# Patient Record
Sex: MALE | Race: WHITE | Employment: OTHER | URBAN - METROPOLITAN AREA
[De-identification: names, ages, dates, MRNs, and addresses within clinical notes are randomized per-mention and may not be internally consistent; named-entity substitution may affect disease eponyms.]

---

## 2017-03-09 ENCOUNTER — OFFICE VISIT (OUTPATIENT)
Dept: OPHTHALMOLOGY | Facility: CLINIC | Age: 57
End: 2017-03-09
Attending: OPHTHALMOLOGY
Payer: MEDICARE

## 2017-03-09 DIAGNOSIS — H49.21 PARALYTIC STRABISMUS, SIXTH OR ABDUCENS NERVE PALSY, RIGHT: Primary | ICD-10-CM

## 2017-03-09 PROCEDURE — 99214 OFFICE O/P EST MOD 30 MIN: CPT | Mod: ZF

## 2017-03-09 PROCEDURE — 92060 SENSORIMOTOR EXAMINATION: CPT | Mod: ZF | Performed by: OPHTHALMOLOGY

## 2017-03-09 PROCEDURE — 99214 OFFICE O/P EST MOD 30 MIN: CPT | Mod: 25

## 2017-03-09 PROCEDURE — V2718 FRESNELL PRISM PRESS-ON LENS: HCPCS | Mod: ZF | Performed by: OPHTHALMOLOGY

## 2017-03-09 ASSESSMENT — EXTERNAL EXAM - RIGHT EYE: OD_EXAM: NORMAL

## 2017-03-09 ASSESSMENT — VISUAL ACUITY
CORRECTION_TYPE: GLASSES
OD_CC: J1+
OD_CC: 20/20-1
METHOD: SNELLEN - LINEAR
OS_CC: 20/20
OS_CC: J1+

## 2017-03-09 ASSESSMENT — CONF VISUAL FIELD
OS_NORMAL: 1
OD_NORMAL: 1
METHOD: COUNTING FINGERS

## 2017-03-09 ASSESSMENT — EXTERNAL EXAM - LEFT EYE: OS_EXAM: NORMAL

## 2017-03-09 ASSESSMENT — REFRACTION_WEARINGRX
OS_SPHERE: +1.75
OS_ADD: +2.50
OD_CYLINDER: +0.75
OD_SPHERE: +1.25
OS_AXIS: 038
SPECS_TYPE: BIFOCAL
OS_CYLINDER: +0.25
OD_ADD: +2.50
OD_AXIS: 162

## 2017-03-09 ASSESSMENT — TONOMETRY
OS_IOP_MMHG: 11
OD_IOP_MMHG: 8
IOP_METHOD: TONOPEN

## 2017-03-09 ASSESSMENT — SLIT LAMP EXAM - LIDS
COMMENTS: NORMAL
COMMENTS: NORMAL

## 2017-03-09 NOTE — PROGRESS NOTES
Assessment & Plan     Charles Santos is a 56 year old male with the following diagnoses:   1. Paralytic strabismus, sixth or abducens nerve palsy, right       History of HIV with good CD4 and viral load.  History of meningioma along pontomedullary junction.  History of right 6th nerve palsy in Spring 2015 which resolved spontaneously.  Now with stable right 6th nerve palsy today from December 2016. Had MRI at [facility], will get report and images.  Will give new Fresnel prism today because broke glasses.  Follow up 6 months sooner as needed for worsening symptoms.  If stable at 6 months from today, then consider strabismus surgery or ground-in prism.          Attending Physician Attestation:  I have seen and examined this patient.  I have confirmed and edited as necessary the chief complaint(s), history of present illness, review of systems, relevant history, and examination findings as documented by others.  I have personally reviewed the relevant tests, images, and reports as documented above.  I have confirmed and edited as necessary the assessment and plan and agree with this note.  - Jonathan Villeda MD 8:52 AM 3/9/2017      Placed 4 DAYNE fresnel on Left Lens of [address]

## 2017-03-09 NOTE — MR AVS SNAPSHOT
After Visit Summary   3/9/2017    Charles Santos    MRN: [id number]           Patient Information     Date Of Birth          1960        Visit Information        Provider Department      3/9/2017 8:15 AM Jonathan Villeda MD [facility] Peds Eye General        Today's Diagnoses     Paralytic strabismus, sixth or abducens nerve palsy, right    -  1       Follow-ups after your visit        Follow-up notes from your care team     Return in about 6 months (around 2017) for PWB .      Your next 10 appointments already scheduled     Sep 12, 2017  8:00 AM CDT   RETURN NEURO with Jonathan Villeda MD   Eye Clinic ([facility])    [address]  [address]  [address]  [facility] [address] [phone]              Who to contact     Please call your clinic at [phone] to:    Ask questions about your health    Make or cancel appointments    Discuss your medicines    Learn about your test results    Speak to your doctor   If you have compliments or concerns about an experience at your clinic, or if you wish to file a complaint, please contact [facility] Phys[facility] Patient Relations at [phone] or email us at [email or web address]         Additional Information About Your Visit        MyChart Information     [employer] is an electronic gateway that provides easy, online access to your medical records. With [employer], you can request a clinic appointment, read your test results, renew a prescription or communicate with your care team.     To sign up for [employer] visit the website at [email or web address]   You will be asked to enter the access code listed below, as well as some personal information. Please follow the directions to create your username and password.     Your access code is: [email or web address]  Expires: 2017  8:51 AM     Your access code will  in 90 days. If you need help or a new code, please contact your [facility] or call [phone] for assistance.        Care EveryWhere ID     This is your Care EveryWhere ID. This could be used by other organizations to access your [place] medical records  [id number]         Blood Pressure from Last 3 Encounters:   06/27/16 137/74    Weight from Last 3 Encounters:   05/02/16 73.5 kg (162 lb)              We Performed the Following     FRESNELL PRISM PRESS-ON LENS     Sensorimotor        Primary Care Provider Office Phone # Fax #    Armin Martini [phone]       [facility] [address]   [facility] [address]        Thank you!     Thank you for choosing [facility] EYE GENERAL  for your care. Our goal is always to provide you with excellent care. Hearing back from our patients is one way we can continue to improve our services. Please take a few minutes to complete the written survey that you may receive in the mail after your visit with us. Thank you!             Your Updated Medication List - Protect others around you: Learn how to safely use, store and throw away your medicines at www.disposemymeds.org.          This list is accurate as of: 3/9/17  9:00 AM.  Always use your most recent med list.                   Brand Name Dispense Instructions for use    ACYCLOVIR PO      Take 400 mg by mouth       albuterol 108 (90 BASE) MCG/ACT Inhaler    PROAIR HFA/PROVENTIL HFA/VENTOLIN HFA     Inhale 2 puffs into the lungs every 6 hours       AMITRIPTYLINE HCL PO      Take 10 mg by mouth       amLODIPine 1 mg/mL Susp    NORVASC         dextromethorphan-guaiFENesin  MG per 12 hr tablet    MUCINEX DM     Take 1 tablet by mouth every 12 hours       efavirenz-emtrictabine-tenofovir 600-200-300 MG per tablet    ATRIPLA     Take 1 tablet by mouth At Bedtime       fluticasone 110 MCG/ACT Inhaler    FLOVENT HFA     Inhale 2 puffs into the lungs 2 times daily       fluticasone 50 MCG/ACT spray    FLONASE     Spray 2 sprays into  both nostrils daily       HYDROcodone-acetaminophen 5-325 MG per tablet    NORCO     Take 1 tablet by mouth every 6 hours as needed       LORazepam 0.5 mg/mL NON-STANDARD dilution 0.5 MG/ML Soln solution          metoprolol 10 mg/mL Susp    LOPRESSOR     Take by mouth 2 times daily       NEXIUM PO          nicotine 10 MG Inhaler    NICOTROL     Inhale 1 cartridge into the lungs daily as needed       potassium chloride SA 20 MEQ CR tablet    K-DUR/KLOR-CON M     Take by mouth 2 times daily       sildenafil 2.5 mg/mL Syringe    REVATIO-VIAGRA     Take 20 mg by mouth every 8 hours       VITAMIN D (CHOLECALCIFEROL) PO      Take 1,000 mg by mouth daily       warfarin 5 MG tablet    COUMADIN     Take by mouth daily

## 2017-03-09 NOTE — NURSING NOTE
Chief Complaints and History of Present Illnesses   Patient presents with     Diplopia Follow Up     HPI    Affected eye(s):  Both   Symptoms:     Double vision      Frequency:  Constant       Do you have eye pain now?:  No      Comments:  Pt states concerned because pt continues to have double vision when not wearing his gls. Pt occasionally has some double with gls on. Pt states got a new pair of gls and was given the same fresnel prism that doesn't fit the frame as well. No pain. Allergy drops QAM OU.    Alix Stephens COT 8:09 AM March 9, 2017

## 2017-03-09 NOTE — LETTER
3/9/2017         RE:  :  MRN: Charles Santos  1960  [id number]     Dear Dr. Martini,    Your patient, Charles Santos, returned for neuro-ophthalmic follow up. My assessment and plan are below.  For further details, please see my attached clinic note.          Assessment & Plan     Charles Santos is a 56 year old male with the following diagnoses:   1. Paralytic strabismus, sixth or abducens nerve palsy, right       History of HIV with good CD4 and viral load.  History of meningioma along pontomedullary junction.  History of right 6th nerve palsy in Spring 2015 which resolved spontaneously.  Now with stable right 6th nerve palsy today from 2016. Had MRI at [facility], will get report and images.  Will give new Fresnel prism today because broke glasses.  Follow up 6 months sooner as needed for worsening symptoms.  If stable at 6 months from today, then consider strabismus surgery or ground-in prism.       Again, thank you for allowing me to participate in the care of your patient.      Sincerely,    Jonathan Villeda MD  Professor, Neuro-Ophthalmology  Department of Ophthalmology and Visual Neurosciences  [facility]      CC: Armin Martini MD  [facility]  [address]  [facility] [address]  VIA Facsimile: [phone]     Nithya Kearney MD  [facility]  [address] [address]  VIA Facsimile: [phone]     Johnathon Kim MD   Physicians  [id number]  [facility] [address]  VIA In Basket

## 2017-04-06 ENCOUNTER — TELEPHONE (OUTPATIENT)
Dept: OPHTHALMOLOGY | Facility: CLINIC | Age: 57
End: 2017-04-06

## 2017-04-06 NOTE — TELEPHONE ENCOUNTER
Patient requested that we call regarding his MRI results. He had an MRI done in 12/16 and was told by someone that he had 2 strokes and was worried. We received the report and MRI images. We discussed the results - he has a stable meningioma, chronic ischemic infarcts of right paracentral and right superior frontal lobe. Reassurance provided. He also states that he feels his prisms aren't working as well. Told him we can have one of the orthoptist check alignment again. He will call to schedule that appt as needed.    Vern Chambers MD  Ophthalmology resident, PGY-3

## 2017-04-20 ENCOUNTER — OFFICE VISIT (OUTPATIENT)
Dept: OPHTHALMOLOGY | Facility: CLINIC | Age: 57
End: 2017-04-20
Attending: OPHTHALMOLOGY
Payer: MEDICARE

## 2017-04-20 DIAGNOSIS — H49.21 PARALYTIC STRABISMUS, SIXTH OR ABDUCENS NERVE PALSY, RIGHT: Primary | ICD-10-CM

## 2017-04-20 DIAGNOSIS — H53.2 DIPLOPIA: ICD-10-CM

## 2017-04-20 PROCEDURE — V2718 FRESNELL PRISM PRESS-ON LENS: HCPCS | Mod: ZF

## 2017-04-20 PROCEDURE — 99214 OFFICE O/P EST MOD 30 MIN: CPT | Mod: ZF

## 2017-04-20 ASSESSMENT — VISUAL ACUITY
CORRECTION_TYPE: GLASSES
METHOD: SNELLEN - LINEAR
OD_CC+: -
OD_CC: 20/20
OS_CC: 20/25

## 2017-04-20 ASSESSMENT — REFRACTION_MANIFEST
OS_ADD: +2.50
OD_ADD: +2.50
OD_CYLINDER: +0.75
OS_SPHERE: +1.75
OD_AXIS: 160
OS_CYLINDER: SPHERE
OD_SPHERE: +1.25

## 2017-04-20 ASSESSMENT — REFRACTION_FINALRX
OS_HPRISM: 3 BO
OD_HPRISM: 3 BO

## 2017-04-20 ASSESSMENT — REFRACTION_WEARINGRX
SPECS_TYPE: BIFOCAL
OS_AXIS: 038
OS_ADD: +2.50
OD_SPHERE: +1.25
OS_SPHERE: +1.75
OD_CYLINDER: +0.75
OD_AXIS: 162
OD_ADD: +2.50
OS_CYLINDER: +0.25

## 2017-04-20 NOTE — PROGRESS NOTES
Chief Complaint(s) & History of Present Illness  Chief Complaint   Patient presents with     Diplopia Follow Up     Sixth nerve palsy. Intermittent horizontal diplopia. Diplopic for few hours on Sunday (4/16/17). Wearing fresnel prism LE, helps with double vision, very blurry and seeing the lines of the prism.           Assessment and Plan:      Charles Santos is a 57 year old male who presents with:     Paralytic strabismus, sixth or abducens nerve palsy, right - Right Eye  Stable - continue to monitor   - FRESNELL PRISM PRESS-ON LENS    Diplopia  Intermittent episodes of diplopia with fresnel, worse when driving. Notes blur and lines on fresnel. Interested in trying ground in prism. I gave new Rx with 6 DAYNE ground in. He will fill prn, and added 6 DAYNE fresnel to Left Lens today.   - FRESNELL PRISM PRESS-ON LENS       PLAN:  F/U with Dr. Villeda as scheduled in Sept.

## 2017-04-20 NOTE — MR AVS SNAPSHOT
After Visit Summary   2017    Charles Santos    MRN: [id number]           Patient Information     Date Of Birth          1960        Visit Information        Provider Department      2017 8:30 AM [facility] EYE ORTHOPTICS [facility] Peds Eye General        Today's Diagnoses     Paralytic strabismus, sixth or abducens nerve palsy, right - Right Eye    -  1    Diplopia           Follow-ups after your visit        Follow-up notes from your care team     Return in about 5 months (around 2017), or scheduled apt with Dr. Villeda .      Your next 10 appointments already scheduled     Sep 12, 2017  8:00 AM CDT   RETURN NEURO with Jonathan Villeda MD   Eye Clinic ([facility])    [address]  [address]  [address]  [facility] [id number] [phone]              Who to contact     Please call your clinic at [phone] to:    Ask questions about your health    Make or cancel appointments    Discuss your medicines    Learn about your test results    Speak to your doctor   If you have compliments or concerns about an experience at your clinic, or if you wish to file a complaint, please contact [facility] Phys[facility] Patient Relations at [phone] or email us at [email or web address]         Additional Information About Your Visit        MyChart Information     Networker is an electronic gateway that provides easy, online access to your medical records. With Networker, you can request a clinic appointment, read your test results, renew a prescription or communicate with your care team.     To sign up for [employer] visit the website at [email or web address]   You will be asked to enter the access code listed below, as well as some personal information. Please follow the directions to create your username and password.     Your access code is: [email or web address]  Expires: 2017  9:51 AM     Your access code will  in 90 days. If you need help  or a new code, please contact your [facility] or call [phone] for assistance.        Care EveryWhere ID     This is your Care EveryWhere ID. This could be used by other organizations to access your [place] medical records  [id number]         Blood Pressure from Last 3 Encounters:   06/27/16 137/74    Weight from Last 3 Encounters:   05/02/16 73.5 kg (162 lb)              We Performed the Following     FRESNELL PRISM PRESS-ON LENS        Primary Care Provider Office Phone # Fax #    Armin Martini [phone]       [facility] [address]   [facility] [address]        Thank you!     Thank you for choosing [facility] EYE GENERAL  for your care. Our goal is always to provide you with excellent care. Hearing back from our patients is one way we can continue to improve our services. Please take a few minutes to complete the written survey that you may receive in the mail after your visit with us. Thank you!             Your Updated Medication List - Protect others around you: Learn how to safely use, store and throw away your medicines at www.disposemymeds.org.          This list is accurate as of: 4/20/17  9:43 AM.  Always use your most recent med list.                   Brand Name Dispense Instructions for use    ACYCLOVIR PO      Take 400 mg by mouth       albuterol 108 (90 BASE) MCG/ACT Inhaler    PROAIR HFA/PROVENTIL HFA/VENTOLIN HFA     Inhale 2 puffs into the lungs every 6 hours       AMITRIPTYLINE HCL PO      Take 10 mg by mouth       amLODIPine 1 mg/mL Susp    NORVASC         dextromethorphan-guaiFENesin  MG per 12 hr tablet    MUCINEX DM     Take 1 tablet by mouth every 12 hours       efavirenz-emtrictabine-tenofovir 600-200-300 MG per tablet    ATRIPLA     Take 1 tablet by mouth At Bedtime       fluticasone 110 MCG/ACT Inhaler    FLOVENT HFA     Inhale 2 puffs into the lungs 2 times daily       fluticasone 50 MCG/ACT spray     FLONASE     Spray 2 sprays into both nostrils daily       HYDROcodone-acetaminophen 5-325 MG per tablet    NORCO     Take 1 tablet by mouth every 6 hours as needed       LORazepam 0.5 mg/mL NON-STANDARD dilution 0.5 MG/ML Soln solution          metoprolol 10 mg/mL Susp    LOPRESSOR     Take by mouth 2 times daily       NEXIUM PO          nicotine 10 MG Inhaler    NICOTROL     Inhale 1 cartridge into the lungs daily as needed       potassium chloride SA 20 MEQ CR tablet    K-DUR/KLOR-CON M     Take by mouth 2 times daily       sildenafil 2.5 mg/mL Syringe    REVATIO-VIAGRA     Take 20 mg by mouth every 8 hours       VITAMIN D (CHOLECALCIFEROL) PO      Take 1,000 mg by mouth daily       warfarin 5 MG tablet    COUMADIN     Take by mouth daily

## 2017-04-20 NOTE — NURSING NOTE
Chief Complaint   Patient presents with     Diplopia Follow Up     Sixth nerve palsy. Intermittent horizontal diplopia. Diplopic for few hours on Sunday (4/16/17). Wearing fresnel prism LE, helps with double vision, very blurry and seeing the lines of the prism.

## 2017-09-05 DIAGNOSIS — H53.10 SUBJECTIVE VISUAL DISTURBANCE: Primary | ICD-10-CM

## 2017-09-25 ENCOUNTER — TRANSFERRED RECORDS (OUTPATIENT)
Dept: HEALTH INFORMATION MANAGEMENT | Facility: CLINIC | Age: 57
End: 2017-09-25

## 2017-09-26 ENCOUNTER — TRANSFERRED RECORDS (OUTPATIENT)
Dept: HEALTH INFORMATION MANAGEMENT | Facility: CLINIC | Age: 57
End: 2017-09-26

## 2017-10-04 ENCOUNTER — TRANSFERRED RECORDS (OUTPATIENT)
Dept: HEALTH INFORMATION MANAGEMENT | Facility: CLINIC | Age: 57
End: 2017-10-04

## 2017-10-10 ENCOUNTER — TELEPHONE (OUTPATIENT)
Dept: OPHTHALMOLOGY | Facility: CLINIC | Age: 57
End: 2017-10-10

## 2017-10-12 ENCOUNTER — OFFICE VISIT (OUTPATIENT)
Dept: OPHTHALMOLOGY | Facility: CLINIC | Age: 57
End: 2017-10-12
Attending: OPHTHALMOLOGY
Payer: MEDICARE

## 2017-10-12 DIAGNOSIS — H53.10 SUBJECTIVE VISUAL DISTURBANCE: ICD-10-CM

## 2017-10-12 DIAGNOSIS — H49.21 6TH NERVE PALSY, RIGHT: Primary | ICD-10-CM

## 2017-10-12 PROCEDURE — 92060 SENSORIMOTOR EXAMINATION: CPT | Mod: ZF | Performed by: OPHTHALMOLOGY

## 2017-10-12 PROCEDURE — 99214 OFFICE O/P EST MOD 30 MIN: CPT | Mod: 25,ZF

## 2017-10-12 RX ORDER — ROSUVASTATIN CALCIUM 10 MG/1
10 TABLET, COATED ORAL
COMMUNITY
Start: 2017-10-03 | End: 2018-02-21 | Stop reason: SINTOL

## 2017-10-12 ASSESSMENT — REFRACTION_WEARINGRX
OS_CYLINDER: SPHERE
OD_AXIS: 163
OS_ADD: +2.50
OD_CYLINDER: +0.75
OS_SPHERE: +1.75
OS_HPRISM: 3 BO
OD_ADD: +2.50
OD_HPRISM: 3 BO
OD_SPHERE: +1.50

## 2017-10-12 ASSESSMENT — VISUAL ACUITY
CORRECTION_TYPE: GLASSES
OS_CC: 20/20
OD_CC: 20/20
METHOD: SNELLEN - LINEAR

## 2017-10-12 ASSESSMENT — TONOMETRY
OD_IOP_MMHG: 11
OS_IOP_MMHG: 12
IOP_METHOD: ICARE

## 2017-10-12 ASSESSMENT — CONF VISUAL FIELD
OD_NORMAL: 1
OS_NORMAL: 1

## 2017-10-12 ASSESSMENT — EXTERNAL EXAM - RIGHT EYE: OD_EXAM: NORMAL

## 2017-10-12 ASSESSMENT — EXTERNAL EXAM - LEFT EYE: OS_EXAM: NORMAL

## 2017-10-12 ASSESSMENT — SLIT LAMP EXAM - LIDS
COMMENTS: NORMAL
COMMENTS: NORMAL

## 2017-10-12 NOTE — MR AVS SNAPSHOT
After Visit Summary   10/12/2017    Charles Santos    MRN: [id number]           Patient Information     Date Of Birth          1960        Visit Information        Provider Department      10/12/2017 2:30 PM Jonathan Villeda MD Eye Clinic        Today's Diagnoses     6th nerve palsy, right    -  1    Subjective visual disturbance           Follow-ups after your visit        Follow-up notes from your care team     Return in about 1 year (around 10/12/2018) for Vision, tension color.      Who to contact     Please call your clinic at [phone] to:    Ask questions about your health    Make or cancel appointments    Discuss your medicines    Learn about your test results    Speak to your doctor   If you have compliments or concerns about an experience at your clinic, or if you wish to file a complaint, please contact [facility] Physicians Patient Relations at [phone] or email us at [email or web address]         Additional Information About Your Visit        MyChart Information     [employer] is an electronic gateway that provides easy, online access to your medical records. With KAL, you can request a clinic appointment, read your test results, renew a prescription or communicate with your care team.     To sign up for [employer] visit the website at [email or web address]   You will be asked to enter the access code listed below, as well as some personal information. Please follow the directions to create your username and password.     Your access code is: [email or web address]  Expires: 2017  6:30 AM     Your access code will  in 90 days. If you need help or a new code, please contact your [facility] or call [phone] for assistance.        Care EveryWhere ID     This is your Care EveryWhere ID. This could be used by other organizations to access your [place] medical records  [id number]         Blood Pressure from  Last 3 Encounters:   06/27/16 137/74    Weight from Last 3 Encounters:   05/02/16 73.5 kg (162 lb)              We Performed the Following     IOP Measurement     Sensorimotor        Primary Care Provider Office Phone # Fax #    Armin Martini [phone]       [facility] [address]   [facility] [address]        Equal Access to Services     [facility]: Hadii aad ku hadasho Soomaali, waaxda luqadaha, qaybta kaalmada adeegyada, waxay idiin hayaan adeeg kharash la'aan ah. So [facility] [phone].    ATENCIÓN: Si danial sanchez, tiene a jaime disposición servicios gratuitos de asistencia lingüística. David al [phone].    We comply with applicable federal civil rights laws and [place] laws. We do not discriminate on the basis of race, color, national origin, age, disability, sex, sexual orientation, or gender identity.            Thank you!     Thank you for choosing EYE CLINIC  for your care. Our goal is always to provide you with excellent care. Hearing back from our patients is one way we can continue to improve our services. Please take a few minutes to complete the written survey that you may receive in the mail after your visit with us. Thank you!             Your Updated Medication List - Protect others around you: Learn how to safely use, store and throw away your medicines at www.disposemymeds.org.          This list is accurate as of: 10/12/17  3:45 PM.  Always use your most recent med list.                   Brand Name Dispense Instructions for use Diagnosis    ACYCLOVIR PO      Take 400 mg by mouth        albuterol 108 (90 BASE) MCG/ACT Inhaler    PROAIR HFA/PROVENTIL HFA/VENTOLIN HFA     Inhale 2 puffs into the lungs every 6 hours        AMITRIPTYLINE HCL PO      Take 10 mg by mouth        amLODIPine 1 mg/mL Susp    NORVASC          dextromethorphan-guaiFENesin  MG per 12 hr tablet    MUCINEX DM     Take 1 tablet by mouth every 12 hours         efavirenz-emtrictabine-tenofovir 600-200-300 MG per tablet    ATRIPLA     Take 1 tablet by mouth At Bedtime        fluticasone 110 MCG/ACT Inhaler    FLOVENT HFA     Inhale 2 puffs into the lungs 2 times daily        fluticasone 50 MCG/ACT spray    FLONASE     Spray 2 sprays into both nostrils daily        HYDROcodone-acetaminophen 5-325 MG per tablet    NORCO     Take 1 tablet by mouth every 6 hours as needed        LORazepam 0.5 mg/mL NON-STANDARD dilution 0.5 MG/ML Soln solution           metoprolol 10 mg/mL Susp    LOPRESSOR     Take by mouth 2 times daily        NEXIUM PO           nicotine 10 MG Inhaler    NICOTROL     Inhale 1 cartridge into the lungs daily as needed        potassium chloride SA 20 MEQ CR tablet    K-DUR/KLOR-CON M     Take by mouth 2 times daily        rosuvastatin 10 MG tablet    CRESTOR     Take 10 mg by mouth        sildenafil 2.5 mg/mL Syringe    REVATIO     Take 20 mg by mouth every 8 hours        VITAMIN D (CHOLECALCIFEROL) PO      Take 1,000 mg by mouth daily        warfarin 5 MG tablet    COUMADIN     Take by mouth daily

## 2017-10-12 NOTE — PROGRESS NOTES
Assessment & Plan     Charles Santos is a 57 year old male with the following diagnoses:   1. 6th nerve palsy, right    2. Subjective visual disturbance         Orthoptist Babar has given him ground prisms and he is happy with them. He is unsure about surgery. His misalignment is small but can be certainly helped with single medial rectus recession preferably the left eye. Follow up 1 year sooner as needed for worsening symptoms.             Attending Physician Attestation:  Complete documentation of historical and exam elements from today's encounter can be found in the full encounter summary report (not reduplicated in this progress note).  I personally obtained the chief complaint(s) and history of present illness.  I confirmed and edited as necessary the review of systems, past medical/surgical history, family history, social history, and examination findings as documented by others; and I examined the patient myself.  I personally reviewed the relevant tests, images, and reports as documented above.  I formulated and edited as necessary the assessment and plan and discussed the findings and management plan with the patient and family. - Jonathan Villeda MD

## 2017-10-12 NOTE — NURSING NOTE
Chief Complaints and History of Present Illnesses   Patient presents with     Neurologic Problem     STRAB F/U      HPI    Symptoms:              Comments:  Charles Santos is a 57 year old male who presents with:      Right 6th nerve palsy   No significant change since the last visit.   6 base out was incorporated at last visit.   No diplopia with glasses on.   With glasses off: no diplopia, but can watch TV without them well.     Geovanna ALMAGUER 2:49 PM October 12, 2017

## 2017-10-27 ENCOUNTER — TRANSFERRED RECORDS (OUTPATIENT)
Dept: HEALTH INFORMATION MANAGEMENT | Facility: CLINIC | Age: 57
End: 2017-10-27

## 2018-01-08 ENCOUNTER — TRANSFERRED RECORDS (OUTPATIENT)
Dept: HEALTH INFORMATION MANAGEMENT | Facility: CLINIC | Age: 58
End: 2018-01-08

## 2018-02-07 ENCOUNTER — TRANSFERRED RECORDS (OUTPATIENT)
Dept: HEALTH INFORMATION MANAGEMENT | Facility: CLINIC | Age: 58
End: 2018-02-07

## 2018-02-16 ENCOUNTER — TRANSFERRED RECORDS (OUTPATIENT)
Dept: HEALTH INFORMATION MANAGEMENT | Facility: CLINIC | Age: 58
End: 2018-02-16

## 2018-02-21 ENCOUNTER — OFFICE VISIT (OUTPATIENT)
Dept: NEUROSURGERY | Facility: CLINIC | Age: 58
End: 2018-02-21
Payer: MEDICARE

## 2018-02-21 VITALS
HEART RATE: 63 BPM | SYSTOLIC BLOOD PRESSURE: 118 MMHG | HEIGHT: 69 IN | DIASTOLIC BLOOD PRESSURE: 59 MMHG | WEIGHT: 147.6 LBS | BODY MASS INDEX: 21.86 KG/M2

## 2018-02-21 DIAGNOSIS — D32.0: Primary | ICD-10-CM

## 2018-02-21 ASSESSMENT — ENCOUNTER SYMPTOMS
MUSCLE WEAKNESS: 0
BACK PAIN: 0
JOINT SWELLING: 0
POLYPHAGIA: 0
CHILLS: 0
HALLUCINATIONS: 0
STIFFNESS: 0
FEVER: 0
NECK PAIN: 1
POLYDIPSIA: 0
DECREASED APPETITE: 0
MYALGIAS: 1
WEIGHT LOSS: 0
FATIGUE: 0
ARTHRALGIAS: 0
NIGHT SWEATS: 0
INCREASED ENERGY: 0
MUSCLE CRAMPS: 1
ALTERED TEMPERATURE REGULATION: 0
WEIGHT GAIN: 0

## 2018-02-21 ASSESSMENT — PAIN SCALES - GENERAL: PAINLEVEL: SEVERE PAIN (6)

## 2018-02-21 NOTE — PROGRESS NOTES
Service Date: 02/21/2018      HISTORY OF PRESENT ILLNESS:  Mr. Santos is seen today at his request to discuss the treatment of his right cerebellopontine angle meningioma.      Mr. Santos is a 57-year-old gentleman with well-controlled HIV and history of pulmonary emboli for which he is on Coumadin who was discovered several years ago to have a meningioma in the right cerebellopontine angle.  This was discovered because of double vision for which he has been followed by Dr. Jonathan Villeda here at the [facility].      His HIV was being treated and general medical care was being given in the [facility] system, but he recently transferred to [facility].  In October, on an MRI to follow up on the meningioma, it was felt that there have been some growth of the tumor and consideration of treatment was recommended.  He spoke both with the radiation oncologists and with Dr. Javier Escalante, one of the neurosurgeons at [facility], and treatment was recommended.  He chose to proceed with surgical treatment.      He was scheduled for surgery this week, but his surgeon is unable to carry out the operation and so he was seeking an alternative surgeon.      His diplopia is mild and well controlled by prism.  He has no other symptoms referable to any of the cranial nerves that would be affected by this tumor.  He does have a chronic head and neck headache, but there is no reason to attribute either directly to this tumor.      He has normal facial movement.  I cannot detect the eye movement disturbance that causes his diplopia.  He has no facial sensory loss.  He says his hearing is normal, and he is not complaining of any balance disturbances.      IMAGING:  I reviewed the scans in detail with him.  The amount of growth registered on the scans that we have, which extended from 07/2016 to 10/2017, is no greater than 2 mm in one direction.  I told him that while it is quite likely this tumor is growing slowly that the  amount of growth demonstrated did not signal any immediate danger to him.  The tumor does touch the surface of the brainstem and it is a rational to consider treatment at this time.      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 Treatment is appropriate    PLAN:  We reviewed both radiosurgical treatment and surgical treatment.  I talked to him about the potential complications related to the nerves adjacent to the tumor including hearing loss, balance disturbance, facial paralysis and numbness of the face.  I told him I could not confidently predict that there would be any change in the double vision but that it was possible that it could either improve or get worse with either surgical or radiosurgical treatment.  We talked about the potential for very serious complications such as stroke, coma and death related to the open surgery and a very low risk of malignant transformation of the tumor with radiosurgery.  We talked about the differences in recovery.  Since radiosurgery is a single treatment outpatient procedure, the recovery is quite rapid, and it is unusual for people to need to take more than a day off.  There are risks of complications coming on from a reaction to the radiation which usually come on several months after treatment that are usually temporary.      We talked about a 2-3 day hospital stay and 4-6 weeks of recovery time after surgery.  Again, I emphasized that I could not confidently predict whether the double vision would be improved or worsened.      We talked about failure of treatment which in radiosurgery can is manifested by continued growth of the tumor and an open surgery would be related to recurrence of the tumor after resection.  In both circumstances, a rate of about 20% over time is expected.      We talked about the fact that his history of pulmonary embolism and requirement for Coumadin and his history of smoking, which he has been unable to stop, both  increase his surgical risk by several percent.      He had already been exposed to all of these issues and has thought them through very carefully.  He asked several questions and then requested that we proceed with scheduling his surgery to be done here.      We will be making those arrangements on an elective basis.         ALLY TURNER MD             D: 2018   T: 2018   MT: KIMI      Name:     JESSIE OSWALD   MRN:      [id number]        Account:      [id number]   :      1960           Service Date: 2018      Document: [id number]

## 2018-02-21 NOTE — PATIENT INSTRUCTIONS
Pre-op Teaching    Procedure:Suboccipital resection of right CP angle meningioma  Planned Surgery Date:By Hospital Scheduling  Surgeon:Kamar                Discussed pre-op routine and requirements to include:  surgical procedure, post-op recovery and expectations, need for H&P, NPO prior to OR, pre-op antibacterial showers, pain control and importance of follow-up visits.  Surgery scheduling will coordinate OR time/date and update patient as appropriate.  3C will call to re-inforce instructions 24-48 hours prior to surgery.       Ample time was provided for patient questions and in-depth discussion of topics of heightened interest.  Reviewed medication list and provided instructions regarding what medications to stop prior to surgery:PAC to determine.   Anti-bacterial soap solution for pre-op showers will be provided at PAC visit as well as specific instructions for use. Approximately 20 minutes spent with patient/family discussing and reviewing.      Patient provided triage contact number for questions or concerns that may arise prior to surgery. Pre-op folder with specific written instructions given to patient for review.

## 2018-02-21 NOTE — MR AVS SNAPSHOT
After Visit Summary   2/21/2018    Charles Santos    MRN: [id number]           Patient Information     Date Of Birth          1960        Visit Information        Provider Department      2/21/2018 11:00 AM Azeem Galvin MD [facility] Neurosurgery        Today's Diagnoses     CPA meningioma (H)    -  1      Care Instructions    Pre-op Teaching    Procedure:Suboccipital resection of right CP angle meningioma  Planned Surgery Date:By Hospital Scheduling  Surgeon:Kamar                Discussed pre-op routine and requirements to include:  surgical procedure, post-op recovery and expectations, need for H&P, NPO prior to OR, pre-op antibacterial showers, pain control and importance of follow-up visits.  Surgery scheduling will coordinate OR time/date and update patient as appropriate.  3C will call to re-inforce instructions 24-48 hours prior to surgery.       Ample time was provided for patient questions and in-depth discussion of topics of heightened interest.  Reviewed medication list and provided instructions regarding what medications to stop prior to surgery:PAC to determine.   Anti-bacterial soap solution for pre-op showers will be provided at PAC visit as well as specific instructions for use. Approximately 20 minutes spent with patient/family discussing and reviewing.      Patient provided triage contact number for questions or concerns that may arise prior to surgery. Pre-op folder with specific written instructions given to patient for review.            Follow-ups after your visit        Additional Services     PAC Visit Referral (For [facility] Only)       Does this visit require an Anesthesia consult?  Pre-op H&P for craniotomy for tumor resection.    H&P done by:  PAC    Please be aware that coverage of these services is subject to the terms and limitations of your health insurance plan.  Call member services at your health plan with any benefit or coverage questions.      Please bring  the following to your appointment:  >>   Any x-rays, CTs or MRIs which have been performed.  Contact the facility where they were done to arrange for  prior to your scheduled appointment.  Any new CT, MRI or other procedures ordered by your specialist must be performed at a [facility] or coordinated by your clinic's referral office.    >>   List of current medications  >>   This referral request   >>   Any documents/labs given to you for this referral                  Your next 10 appointments already scheduled     Apr 02, 2018  9:00 AM CDT   (Arrive by 8:45 AM)   Hearing Evaluation with Amanda Chong   [facility] ([facility])    [address] [place] [id number] [phone]           Please see your medical professional for ear cleaning prior to this appointment if you believe wax buildup may be an issue. All patients are required to have a physician's order stating the medical reason for the hearing test. Your doctor can send an electronic order, use their own form or we have provided a form (called Physician's Order for Audiology Services). It states that there is a medical reason for your exam. Without an order you may need to be rescheduled until the order can be obtained.            Apr 02, 2018 10:00 AM CDT   (Arrive by 9:45 AM)   PAC EVALUATION with Angelita Pac Jordan 7   [facility] Preoperative Assessment [place] ([facility])    [address] [place] [address]   [phone]            Apr 02, 2018 11:00 AM CDT   (Arrive by 10:45 AM)   PAC RN ASSESSMENT with Angelita Pac Rn   [facility] Assessment [place] ([facility])    [address] [place] [address]   [phone]            Apr 02, 2018 11:20 AM CDT   (Arrive by 11:05 AM)   PAC Anesthesia Consult with Angelita Pac Anesthesiologist   [facility] Assessment [place] ([facility]  Clinics [facility])    [address]  [address]  [facility] [address]   [phone]            Apr 12, 2018  9:20 AM CDT   (Arrive by 9:05 AM)   CT HEAD W CONTRAST with UUCT1   [facility], [place], [place] ([facility])    [address] [id number] [phone]           Please bring any scans or X-rays taken at other hospitals, if similar tests were done. Also bring a list of your medicines, including vitamins, minerals and over-the-counter drugs. It is safest to leave personal items at home.  Be sure to tell your doctor:   If you have any allergies.   If there s any chance you are pregnant.   If you are breastfeeding.    If you have diabetes as your medication may need to be adjusted for this exam.  You will have contrast for this exam. To prepare:   Do not eat or drink for 2 hours before your exam. If you need to take medicine, you may take it with small sips of water. (We may ask you to take liquid medicine as well.)   The day before your exam, drink extra fluids at least six 8-ounce glasses (unless your doctor tells you to restrict your fluids).  Patients over 70 or patients with diabetes or kidney problems:   If you haven t had a blood test (creatinine test) within the last 30 days, the Cardiologist/Radiologist may require you to get this test prior to your exam.  Please wear loose clothing, such as a sweat suit or jogging clothes. Avoid snaps, zippers and other metal. We may ask you to undress and put on a hospital gown.  If you have any questions, please call the Imaging Department where you will have your exam.            Apr 12, 2018   Procedure with Azeem Galvin MD   [facility], [place], Same Day Surgery (--)    [address] [id number] [phone]              Who to contact     Please call your clinic at [phone] to:    Ask questions about your health    Make or cancel appointments    Discuss your  "medicines    Learn about your test results    Speak to your doctor            Additional Information About Your Visit        MyChart Information     [employer] is an electronic gateway that provides easy, online access to your medical records. With [employer], you can request a clinic appointment, read your test results, renew a prescription or communicate with your care team.     To sign up for [employer] visit the website at [email or web address]   You will be asked to enter the access code listed below, as well as some personal information. Please follow the directions to create your username and password.     Your access code is: [email or web address]  Expires: 2018  6:30 AM     Your access code will  in 90 days. If you need help or a new code, please contact your [facility] or call [phone] for assistance.        Care EveryWhere ID     This is your Care EveryWhere ID. This could be used by other organizations to access your [facility] medical records  [id number]        Your Vitals Were     Pulse Height BMI (Body Mass Index)             63 1.753 m (5' 9\") 21.8 kg/m2          Blood Pressure from Last 3 Encounters:   18 118/59   16 137/74    Weight from Last 3 Encounters:   18 67 kg (147 lb 9.6 oz)   16 73.5 kg (162 lb)              We Performed the Following     [place] AUDIOGRAM     PAC Visit Referral (For [facility] Only)     [facility]igha-Operative Worksheet          Today's Medication Changes          These changes are accurate as of 18 11:59 PM.  If you have any questions, ask your nurse or doctor.               Stop taking these medicines if you haven't already. Please contact your care team if you have questions.     rosuvastatin 10 MG tablet   Commonly known as:  CRESTOR   Stopped by:  Azeem Galvin MD                    Primary Care Provider Office Phone # Fax #    Chapo Zafar [phone]       [facility] [address] [place] [address]        Equal Access to Services     BHARGAVIDAVID HUI : Hadii ji ku hadjames Conklin, wazahrada luqcortney, qamansoorta kajonathonda evatiarra, waxsherri stefaniein hayaaeleuterio brown zeynepkari luna . So [facility] [phone].    ATENCIÓN: Si habla español, tiene a jaime disposición servicios gratuitos de asistencia lingüística. [facility] [phone].    We comply with applicable federal civil rights laws and [place] laws. We do not discriminate on the basis of race, color, national origin, age, disability, sex, sexual orientation, or gender identity.            Thank you!     Thank you for choosing [facility]  for your care. Our goal is always to provide you with excellent care. Hearing back from our patients is one way we can continue to improve our services. Please take a few minutes to complete the written survey that you may receive in the mail after your visit with us. Thank you!             Your Updated Medication List - Protect others around you: Learn how to safely use, store and throw away your medicines at www.disposemymeds.org.          This list is accurate as of 2/21/18 11:59 PM.  Always use your most recent med list.                   Brand Name Dispense Instructions for use Diagnosis    ACYCLOVIR PO      Take 400 mg by mouth        albuterol 108 (90 BASE) MCG/ACT Inhaler    PROAIR HFA/PROVENTIL HFA/VENTOLIN HFA     Inhale 2 puffs into the lungs every 6 hours as needed        AMITRIPTYLINE HCL PO      Take 10 mg by mouth nightly as needed        amLODIPine 1 mg/mL Susp    NORVASC     Take 5 mg by mouth daily        efavirenz-emtrictabine-tenofovir 600-200-300 MG per tablet    ATRIPLA     Take 1 tablet by mouth At Bedtime        fluticasone 110 MCG/ACT Inhaler    FLOVENT HFA     Inhale 2 puffs into the lungs 2 times daily as needed        fluticasone 50 MCG/ACT spray    FLONASE     Spray 2 sprays into both nostrils daily        LORazepam 0.5 mg/mL NON-STANDARD dilution 0.5 MG/ML Soln  solution      Take by mouth every 6 hours as needed        metoprolol 10 mg/mL Susp    LOPRESSOR     Take by mouth daily        NEXIUM PO      Take by mouth every morning (before breakfast)        nicotine 10 MG Inhaler    NICOTROL     Inhale 1 cartridge into the lungs daily as needed        potassium chloride SA 20 MEQ CR tablet    K-DUR/KLOR-CON M     Take by mouth daily Takes 2 pills once per day        sildenafil 2.5 mg/mL Syringe    REVATIO     Take 20 mg by mouth every 8 hours        VITAMIN D (CHOLECALCIFEROL) PO      Take 1,000 mg by mouth daily        warfarin 5 MG tablet    COUMADIN     Take by mouth daily

## 2018-02-21 NOTE — LETTER
2/21/2018       RE: Charles Santos  [address]  [place] [address]     Dear Colleague,    Thank you for referring your patient, Charles Santos, to the [facility] NEUROSURGERY at [facility]. Please see a copy of my visit note below.    Service Date: 02/21/2018      HISTORY OF PRESENT ILLNESS:  Mr. Santos is seen today at his request to discuss the treatment of his right cerebellopontine angle meningioma.      Mr. Santos is a 57-year-old gentleman with well-controlled HIV and history of pulmonary emboli for which he is on Coumadin who was discovered several years ago to have a meningioma in the right cerebellopontine angle.  This was discovered because of double vision for which he has been followed by Dr. Jonathan Villeda here at the [facility].      His HIV was being treated and general medical care was being given in the [facility] system, but he recently [facility] to [facility].  In October, on an MRI to follow up on the meningioma, it was felt that there have been some growth of the tumor and consideration of treatment was recommended.  He spoke both with the radiation oncologists and with Dr. Javier Escalante, one of the neurosurgeons at [facility], and treatment was recommended.  He chose to proceed with surgical treatment.      He was scheduled for surgery this week, but his surgeon is unable to carry out the operation and so he was seeking an alternative surgeon.      His diplopia is mild and well controlled by prism.  He has no other symptoms referable to any of the cranial nerves that would be affected by this tumor.  He does have a chronic head and neck headache, but there is no reason to attribute either directly to this tumor.      He has normal facial movement.  I cannot detect the eye movement disturbance that causes his diplopia.  He has no facial sensory loss.  He says his hearing is normal, and he is not complaining of any balance  disturbances.      IMAGING:  I reviewed the scans in detail with him.  The amount of growth registered on the scans that we have, which extended from 07/2016 to 10/2017, is no greater than 2 mm in one direction.  I told him that while it is quite likely this tumor is growing slowly that the amount of growth demonstrated did not signal any immediate danger to him.  The tumor does touch the surface of the brainstem and it is a rational to consider treatment at this time.      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 Treatment is appropriate    PLAN:  We reviewed both radiosurgical treatment and surgical treatment.  I talked to him about the potential complications related to the nerves adjacent to the tumor including hearing loss, balance disturbance, facial paralysis and numbness of the face.  I told him I could not confidently predict that there would be any change in the double vision but that it was possible that it could either improve or get worse with either surgical or radiosurgical treatment.  We talked about the potential for very serious complications such as stroke, coma and death related to the open surgery and a very low risk of malignant transformation of the tumor with radiosurgery.  We talked about the differences in recovery.  Since radiosurgery is a single treatment outpatient procedure, the recovery is quite rapid, and it is unusual for people to need to take more than a day off.  There are risks of complications coming on from a reaction to the radiation which usually come on several months after treatment that are usually temporary.      We talked about a 2-3 day hospital stay and 4-6 weeks of recovery time after surgery.  Again, I emphasized that I could not confidently predict whether the double vision would be improved or worsened.      We talked about failure of treatment which in radiosurgery can is manifested by continued growth of the tumor and an open surgery would  be related to recurrence of the tumor after resection.  In both circumstances, a rate of about 20% over time is expected.      We talked about the fact that his history of pulmonary embolism and requirement for Coumadin and his history of smoking, which he has been unable to stop, both increase his surgical risk by several percent.      He had already been exposed to all of these issues and has thought them through very carefully.  He asked several questions and then requested that we proceed with scheduling his surgery to be done here.      We will be making those arrangements on an elective basis.     D: 2018   T: 2018   MT: KIMI      Name:     JESSIE OSWALD   MRN:      [id number]        Account:      [id number]   :      1960           Service Date: 2018      Document: [id number]        Again, thank you for allowing me to participate in the care of your patient.      Sincerely,    Azeem Galvin MD

## 2018-02-22 ENCOUNTER — TRANSFERRED RECORDS (OUTPATIENT)
Dept: HEALTH INFORMATION MANAGEMENT | Facility: CLINIC | Age: 58
End: 2018-02-22

## 2018-02-22 DIAGNOSIS — D32.0 BENIGN NEOPLASM OF CEREBRAL MENINGES (H): Primary | ICD-10-CM

## 2018-02-23 ENCOUNTER — CARE COORDINATION (OUTPATIENT)
Dept: NEUROSURGERY | Facility: CLINIC | Age: 58
End: 2018-02-23

## 2018-02-23 RX ORDER — CLINDAMYCIN PHOSPHATE 900 MG/50ML
900 INJECTION, SOLUTION INTRAVENOUS SEE ADMIN INSTRUCTIONS
Status: CANCELLED | OUTPATIENT
Start: 2018-02-23

## 2018-02-23 RX ORDER — CLINDAMYCIN PHOSPHATE 900 MG/50ML
900 INJECTION, SOLUTION INTRAVENOUS
Status: CANCELLED | OUTPATIENT
Start: 2018-02-23

## 2018-02-23 NOTE — PROGRESS NOTES
Pre-op Teaching    Procedure:Suboccipital Resection of Right CP Angle Meningioma.  Planned Surgery Date:4/12/18              Discussed pre-op routine and requirements to include:  surgical procedure, post-op recovery and expectations, need for H&P, NPO prior to OR, pre-op antibacterial showers, pain control and importance of follow-up visits.  Surgery scheduling will coordinate OR time/date and update patient as appropriate.  3C will call to re-inforce instructions 24-48 hours prior to surgery.       Ample time was provided for patient questions and in-depth discussion of topics of heightened interest.  Reviewed medication list and provided instructions regarding what medications to stop prior to surgery: Coumadin, will contact Dr. Zafar for Instructions/Bridging.   Anti-bacterial soap solution for pre-op showers will be provided at PAC visit as well as specific instructions for use. Approximately 20 minutes spent with patient/family discussing and reviewing.      Patient provided triage contact number for questions or concerns that may arise prior to surgery. Pre-op folder with specific written instructions given to patient for review.  Surgery Date/Time, Pac appointments, Hearing test appointment reviewed with patient.  Surgery letter and Neuro pre op check list forms mailed to patient.  Patient verifies information.       2/23/18 9:45am Dr. Zafar [phone] Manages and prescribes. Call to office, spoke with Jordyn, note written for Dr Zafar for Coumadin/Bridging instructions prior to Menigioma surgery on 4/12/18.  States office will get back to patient and myself, phone number given.    Voices understanding.   Surgery sheet and pre op check list mailed to patient.

## 2018-03-07 ENCOUNTER — MEDICAL CORRESPONDENCE (OUTPATIENT)
Dept: HEALTH INFORMATION MANAGEMENT | Facility: CLINIC | Age: 58
End: 2018-03-07

## 2018-03-08 NOTE — PROGRESS NOTES
Spoke with Soniya Elizondo office that monitors patients anticoagulation program.  Patient now on Xarelto 20mg daily.  Per Dr. Gamino patient may stop Xarelto up to 72 hours prior to procedure set for April 4/12/18 with Dr. Galvin.  4/12/18:  Suboccipital Resection of Right CP Angle Meningioma.    Soniya rojas hard copy.  PAC appointment 4/2/18.  Last dose of Xarelto on 4/8/18.

## 2018-03-23 NOTE — PROGRESS NOTES
Mr. Santos is a 57 year old man with h/o meningioma who is planned for removal 4/12 who called the on call resident Today at 5 pm regarding a spell that occurred on 3/20. He says that he was told to contact the neurosurgery resident.  He spoke with the neurosurgery resident.

## 2018-03-24 ENCOUNTER — HOSPITAL ENCOUNTER (EMERGENCY)
Facility: CLINIC | Age: 58
Discharge: HOME | End: 2018-03-24
Attending: EMERGENCY MEDICINE | Admitting: EMERGENCY MEDICINE
Payer: MEDICARE

## 2018-03-24 VITALS
SYSTOLIC BLOOD PRESSURE: 143 MMHG | WEIGHT: 154.1 LBS | RESPIRATION RATE: 18 BRPM | DIASTOLIC BLOOD PRESSURE: 81 MMHG | OXYGEN SATURATION: 97 % | BODY MASS INDEX: 22.76 KG/M2 | TEMPERATURE: 97.8 F

## 2018-03-24 DIAGNOSIS — R46.89 SPELL OF ABNORMAL BEHAVIOR: ICD-10-CM

## 2018-03-24 DIAGNOSIS — R25.1 EPISODE OF SHAKING: ICD-10-CM

## 2018-03-24 DIAGNOSIS — D32.9 MENINGIOMA (H): Primary | ICD-10-CM

## 2018-03-24 DIAGNOSIS — D32.9 MENINGIOMA (H): ICD-10-CM

## 2018-03-24 LAB
ALBUMIN SERPL-MCNC: 3.6 G/DL (ref 3.4–5)
ALBUMIN UR-MCNC: NEGATIVE MG/DL
ALP SERPL-CCNC: 78 U/L (ref 40–150)
ALT SERPL W P-5'-P-CCNC: 22 U/L (ref 0–70)
ANION GAP SERPL CALCULATED.3IONS-SCNC: 7 MMOL/L (ref 3–14)
APAP SERPL-MCNC: <2 MG/L (ref 10–20)
APPEARANCE UR: CLEAR
AST SERPL W P-5'-P-CCNC: 6 U/L (ref 0–45)
BASOPHILS # BLD AUTO: 0 10E9/L (ref 0–0.2)
BASOPHILS NFR BLD AUTO: 0.3 %
BILIRUB SERPL-MCNC: 0.3 MG/DL (ref 0.2–1.3)
BILIRUB UR QL STRIP: NEGATIVE
BUN SERPL-MCNC: 12 MG/DL (ref 7–30)
CALCIUM SERPL-MCNC: 8.8 MG/DL (ref 8.5–10.1)
CHLORIDE SERPL-SCNC: 110 MMOL/L (ref 94–109)
CO2 SERPL-SCNC: 21 MMOL/L (ref 20–32)
COLOR UR AUTO: NORMAL
CREAT SERPL-MCNC: 0.7 MG/DL (ref 0.66–1.25)
DIFFERENTIAL METHOD BLD: ABNORMAL
EOSINOPHIL # BLD AUTO: 0.1 10E9/L (ref 0–0.7)
EOSINOPHIL NFR BLD AUTO: 1 %
ERYTHROCYTE [DISTWIDTH] IN BLOOD BY AUTOMATED COUNT: 14.3 % (ref 10–15)
GFR SERPL CREATININE-BSD FRML MDRD: >90 ML/MIN/1.7M2
GLUCOSE SERPL-MCNC: 120 MG/DL (ref 70–99)
GLUCOSE UR STRIP-MCNC: NEGATIVE MG/DL
HCT VFR BLD AUTO: 39.8 % (ref 40–53)
HGB BLD-MCNC: 13.4 G/DL (ref 13.3–17.7)
HGB UR QL STRIP: NEGATIVE
IMM GRANULOCYTES # BLD: 0 10E9/L (ref 0–0.4)
IMM GRANULOCYTES NFR BLD: 0.1 %
KETONES UR STRIP-MCNC: NEGATIVE MG/DL
LEUKOCYTE ESTERASE UR QL STRIP: NEGATIVE
LYMPHOCYTES # BLD AUTO: 2.1 10E9/L (ref 0.8–5.3)
LYMPHOCYTES NFR BLD AUTO: 31 %
MCH RBC QN AUTO: 31.1 PG (ref 26.5–33)
MCHC RBC AUTO-ENTMCNC: 33.7 G/DL (ref 31.5–36.5)
MCV RBC AUTO: 92 FL (ref 78–100)
MONOCYTES # BLD AUTO: 0.4 10E9/L (ref 0–1.3)
MONOCYTES NFR BLD AUTO: 6.6 %
NEUTROPHILS # BLD AUTO: 4.1 10E9/L (ref 1.6–8.3)
NEUTROPHILS NFR BLD AUTO: 61 %
NITRATE UR QL: NEGATIVE
NRBC # BLD AUTO: 0 10*3/UL
NRBC BLD AUTO-RTO: 0 /100
PH UR STRIP: 6.5 PH (ref 5–7)
PLATELET # BLD AUTO: 147 10E9/L (ref 150–450)
POTASSIUM SERPL-SCNC: 4.1 MMOL/L (ref 3.4–5.3)
PROT SERPL-MCNC: 7.2 G/DL (ref 6.8–8.8)
RBC # BLD AUTO: 4.31 10E12/L (ref 4.4–5.9)
SODIUM SERPL-SCNC: 138 MMOL/L (ref 133–144)
SOURCE: NORMAL
SP GR UR STRIP: 1.01 (ref 1–1.03)
UROBILINOGEN UR STRIP-MCNC: NORMAL MG/DL (ref 0–2)
WBC # BLD AUTO: 6.7 10E9/L (ref 4–11)

## 2018-03-24 PROCEDURE — 80053 COMPREHEN METABOLIC PANEL: CPT | Performed by: EMERGENCY MEDICINE

## 2018-03-24 PROCEDURE — 99284 EMERGENCY DEPT VISIT MOD MDM: CPT | Mod: Z6 | Performed by: EMERGENCY MEDICINE

## 2018-03-24 PROCEDURE — 85025 COMPLETE CBC W/AUTO DIFF WBC: CPT | Performed by: EMERGENCY MEDICINE

## 2018-03-24 PROCEDURE — 80329 ANALGESICS NON-OPIOID 1 OR 2: CPT | Performed by: EMERGENCY MEDICINE

## 2018-03-24 PROCEDURE — 81003 URINALYSIS AUTO W/O SCOPE: CPT | Performed by: EMERGENCY MEDICINE

## 2018-03-24 PROCEDURE — 99284 EMERGENCY DEPT VISIT MOD MDM: CPT | Mod: 25

## 2018-03-24 RX ORDER — LEVETIRACETAM 750 MG/1
750 TABLET ORAL 2 TIMES DAILY
Qty: 42 TABLET | Refills: 0 | Status: SHIPPED | OUTPATIENT
Start: 2018-03-24 | End: 2018-04-02

## 2018-03-24 ASSESSMENT — ENCOUNTER SYMPTOMS
FEVER: 0
WEAKNESS: 0
HEADACHES: 1
NUMBNESS: 0
SPEECH DIFFICULTY: 0
SHORTNESS OF BREATH: 0
SEIZURES: 1

## 2018-03-24 NOTE — DISCHARGE INSTRUCTIONS
Please make an appointment to follow up with Neurology Clinic (phone: [phone]) within 7 days.  Neurology recommends you undergo an outpatient EEG.  Take Keppra as directed.  DO NOT DRIVE UNTIL FOLLOW UP OR UNTIL CLEARED BY NEUROLOGY OR NEUROSURGERY

## 2018-03-24 NOTE — ED AVS SNAPSHOT
[facility], Emergency Department    [address] [address]    Phone:  [phone]                                       Charles Santos   MRN: [id number]    Department:  [facility], Emergency Department   Date of Visit:  3/24/2018           Patient Information     Date Of Birth          1960        Your diagnoses for this visit were:     Episode of shaking--rule out seizure     Meningioma (H)        You were seen by Raven Miramontes MD.        Discharge Instructions       Please make an appointment to follow up with Neurology Clinic (phone: [phone]) within 7 days.  Neurology recommends you undergo an outpatient EEG.  Take Keppra as directed.  DO NOT DRIVE UNTIL FOLLOW UP OR UNTIL CLEARED BY NEUROLOGY OR NEUROSURGERY    Your next 10 appointments already scheduled     Apr 02, 2018  9:00 AM CDT   (Arrive by 8:45 AM)   Hearing Evaluation with Amanda Chong   [facility] Audiology ([facility])    [address] [place] [address] [phone]           Please see your medical professional for ear cleaning prior to this appointment if you believe wax buildup may be an issue. All patients are required to have a physician's order stating the medical reason for the hearing test. Your doctor can send an electronic order, use their own form or we have provided a form (called Physician's Order for Audiology Services). It states that there is a medical reason for your exam. Without an order you may need to be rescheduled until the order can be obtained.            Apr 02, 2018 10:00 AM CDT   (Arrive by 9:45 AM)   PAC EVALUATION with Uc Pac Jordan 7   [facility] Preoperative Assessment [place] ([facility])    [address] [place] [address] [phone]            Apr 02, 2018 11:00 AM CDT   (Arrive by 10:45 AM)   PAC RN ASSESSMENT with Angelita Pac Rn   [facility] Assessment [place] (  [facility] [place])    [address]  [address] [facility] [address]   [phone]            Apr 02, 2018 11:20 AM CDT   (Arrive by 11:05 AM)   PAC Anesthesia Consult with  Pac Anesthesiologist   [facility] ([facility] [place])    [address] [address]   [phone]            Apr 12, 2018  9:20 AM CDT   (Arrive by 9:05 AM)   CT HEAD W CONTRAST with UUCT4   [facility], [place], [place] ([facility], [facility])    [address] [facility] [address]   [phone]           Please bring any scans or X-rays taken at other hospitals, if similar tests were done. Also bring a list of your medicines, including vitamins, minerals and over-the-counter drugs. It is safest to leave personal items at home.  Be sure to tell your doctor:   If you have any allergies.   If there s any chance you are pregnant.   If you are breastfeeding.    If you have diabetes as your medication may need to be adjusted for this exam.  You will have contrast for this exam. To prepare:   Do not eat or drink for 2 hours before your exam. If you need to take medicine, you may take it with small sips of water. (We may ask you to take liquid medicine as well.)   The day before your exam, drink extra fluids at least six 8-ounce glasses (unless your doctor tells you to restrict your fluids).  Patients over 70 or patients with diabetes or kidney problems:   If you haven t had a blood test (creatinine test) within the last 30 days, the Cardiologist/Radiologist may require you to get this test prior to your exam.  Please wear loose clothing, such as a sweat suit or jogging clothes. Avoid snaps, zippers and other metal. We may ask you to undress and put on a hospital gown.  If you have any questions, please call the Imaging Department where you will have your exam.            Apr 12, 2018   Procedure with  Azeem Galvin MD   [facility], [place], Same Day Surgery (--)    [address]  [place] [id number] [phone]              24 Hour Appointment Hotline       To make an appointment at any [place] clinic, call [phone] ([phone]). If you don't have a family doctor or clinic, we will help you find one. [place] clinics are conveniently located to serve the needs of you and your family.             Review of your medicines      START taking        Dose / Directions Last dose taken    levETIRAcetam 750 MG tablet   Commonly known as:  KEPPRA   Dose:  750 mg   Quantity:  42 tablet        Take 1 tablet (750 mg) by mouth 2 times daily   Refills:  0          Our records show that you are taking the medicines listed below. If these are incorrect, please call your family doctor or clinic.        Dose / Directions Last dose taken    ACYCLOVIR PO   Dose:  400 mg        Take 400 mg by mouth   Refills:  0        albuterol 108 (90 BASE) MCG/ACT Inhaler   Commonly known as:  PROAIR HFA/PROVENTIL HFA/VENTOLIN HFA   Dose:  2 puff        Inhale 2 puffs into the lungs every 6 hours as needed   Refills:  0        AMITRIPTYLINE HCL PO   Dose:  10 mg        Take 10 mg by mouth nightly as needed   Refills:  0        amLODIPine 1 mg/mL Susp   Commonly known as:  NORVASC   Dose:  5 mg        Take 5 mg by mouth daily   Refills:  0        efavirenz-emtrictabine-tenofovir 600-200-300 MG per tablet   Commonly known as:  ATRIPLA   Dose:  1 tablet        Take 1 tablet by mouth At Bedtime   Refills:  0        fluticasone 110 MCG/ACT Inhaler   Commonly known as:  FLOVENT HFA   Dose:  2 puff        Inhale 2 puffs into the lungs 2 times daily as needed   Refills:  0        fluticasone 50 MCG/ACT spray   Commonly known as:  FLONASE   Dose:  2 spray        Spray 2 sprays into both nostrils daily   Refills:  0        LORazepam 0.5 mg/mL NON-STANDARD dilution 0.5 MG/ML Soln solution        Take by mouth every 6 hours as needed   Refills:   0        metoprolol 10 mg/mL Susp   Commonly known as:  LOPRESSOR        Take by mouth daily   Refills:  0        NEXIUM PO        Take by mouth every morning (before breakfast)   Refills:  0        nicotine 10 MG Inhaler   Commonly known as:  NICOTROL   Dose:  1 cartridge.        Inhale 1 cartridge into the lungs daily as needed   Refills:  0        potassium chloride SA 20 MEQ CR tablet   Commonly known as:  K-DUR/KLOR-CON M        Take by mouth daily Takes 2 pills once per day   Refills:  0        sildenafil 2.5 mg/mL Syringe   Commonly known as:  REVATIO   Dose:  20 mg        Take 20 mg by mouth every 8 hours   Refills:  0        VITAMIN D (CHOLECALCIFEROL) PO   Dose:  1000 mg        Take 1,000 mg by mouth daily   Refills:  0        XARELTO PO        Refills:  0                Prescriptions were sent or printed at these locations (1 Prescription)                   Other Prescriptions                Printed at Department/Unit printer (1 of 1)         levETIRAcetam (KEPPRA) 750 MG tablet                Procedures and tests performed during your visit     Acetaminophen level    CBC with platelets differential    Comprehensive metabolic panel    UA reflex to Microscopic and Culture      Orders Needing Specimen Collection     None      Pending Results     No orders found from 3/22/2018 to 3/25/2018.            Pending Culture Results     No orders found from 3/22/2018 to 3/25/2018.            Pending Results Instructions     If you had any lab results that were not finalized at the time of your Discharge, you can call the ED Lab Result RN at [phone]. You will be contacted by this team for any positive Lab results or changes in treatment. The nurses are available 7 days a week from 10A to 6:30P.  You can leave a message 24 hours per day and they will return your call.        Thank you for choosing [facility]       Thank you for choosing [facility] for your care. Our goal is always to provide you with excellent care.  "Hearing back from our patients is one way we can continue to improve our services. Please take a few minutes to complete the written survey that you may receive in the mail after you visit with us. Thank you!        [employer] Information     [employer] lets you send messages to your doctor, view your test results, renew your prescriptions, schedule appointments and more. To sign up, go to [email or web address] . Click on \"Log in\" on the left side of the screen, which will take you to the Welcome page. Then click on \"Sign up Now\" on the right side of the page.     You will be asked to enter the access code listed below, as well as some personal information. Please follow the directions to create your username and password.     Your access code is: [email or web address]  Expires: 2018  7:30 AM     Your access code will  in 90 days. If you need help or a new code, please call your [place] clinic or [phone].        Care EveryWhere ID     This is your Care EveryWhere ID. This could be used by other organizations to access your [place] medical records  [id number]        Equal Access to Services     KAITLYNN AMES : Hadamita Conklin, leyda roth, wu ruggiero, vineet luna . [facility] [phone].    ATENCIÓN: Si habla español, tiene a jaime disposición servicios gratuitos de asistencia lingüística. David al [phone].    We comply with applicable federal civil rights laws and [place] laws. We do not discriminate on the basis of race, color, national origin, age, disability, sex, sexual orientation, or gender identity.            After Visit Summary       This is your record. Keep this with you and show to your community pharmacist(s) and doctor(s) at your next visit.                  "

## 2018-03-24 NOTE — ED PROVIDER NOTES
"  History     Chief Complaint   Patient presents with     Seizures     HPI  Chalres Santos is a 57 year old male with a history of HIV, right cerebellopontine angle meningioma scheduled for craniotomy on 4/12/18, PEs, and DVTs who presents to the Emergency Department for evaluation of seizures.  Patient reports that he believes that he might affect seizure on Tuesday (5 days ago).  He states that he sitting at a table having coffee with a friend when he felt his head drop and felt himself making \"weird movements\".  He states that he was aware that this was happening and that he felt \"strange\" and \"out of body\".  He has never had anything like this in the past and has had no recurrence since this episode.  Patient states that he was recently switched to Xarelto about 2-3 weeks ago (was on warfarin prior to this) and has had more headaches since starting this.  He has been taking Tylenol daily, but has not taken this in the past 3 days.  He spoke with her neurologist yesterday who recommended that he present to the ED to be evaluated.  He was unable to come in last night as he was .  Today, he denies any chest pain, shortness of breath, recent fevers.  Per triage RN, he denies any numbness/tingling, gait disturbance, difficulty speaking, worsening vision, or other complaints.    Social: Patient presents with his best friend who brought him here today.  He is a current everyday smoker and denies any alcohol or drug use.    Past Medical History:   Diagnosis Date     Brain tumor (benign) (H)      H/O magnetic resonance imaging of orbit      HIV (human immunodeficiency virus infection) (H) 1987       Past Surgical History:   Procedure Laterality Date     DENTAL SURGERY  1990       No family history on file.    Social History   Substance Use Topics     Smoking status: Current Every Day Smoker     Smokeless tobacco: Never Used     Alcohol use No       No current facility-administered medications for this " encounter.      Current Outpatient Prescriptions   Medication     Rivaroxaban (XARELTO PO)     levETIRAcetam (KEPPRA) 750 MG tablet     ACYCLOVIR PO     albuterol (PROAIR HFA, PROVENTIL HFA, VENTOLIN HFA) 108 (90 BASE) MCG/ACT inhaler     AMITRIPTYLINE HCL PO     amLODIPine (NORVASC) 1 mg/mL     efavirenz-emtrictabine-tenofovir (ATRIPLA) 600-200-300 MG per tablet     fluticasone (FLONASE) 50 MCG/ACT nasal spray     fluticasone (FLOVENT HFA) 110 MCG/ACT inhaler     LORazepam 0.5 mg/mL NON-STANDARD dilution 0.5 MG/ML SOLN oral solution     metoprolol (LOPRESSOR) 10 mg/mL     Esomeprazole Magnesium (NEXIUM PO)     nicotine (NICOTROL) 10 MG inhaler     potassium chloride SA (K-DUR,KLOR-CON M) 20 MEQ tablet     sildenafil (REVATIO-VIAGRA) 2.5 mg/mL     VITAMIN D, CHOLECALCIFEROL, PO        Allergies   Allergen Reactions     Nevirapine      PN: LW Reaction: Burns, Blisters  1997, [facility]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  (a side effect, not a true allergy).   Abnormal behavior.  (a side effect, not a true allergy).      Penicillins Rash     1984 1984         I have reviewed the Medications, Allergies, Past Medical and Surgical History, and Social History in the Epic system.    Review of Systems   Constitutional: Negative for fever.   Eyes: Negative for visual disturbance.   Respiratory: Negative for shortness of breath.    Cardiovascular: Negative for chest pain.   Musculoskeletal: Negative for gait problem.   Neurological: Positive for seizures and headaches. Negative for speech difficulty, weakness and numbness.   All other systems reviewed and are negative.      Physical Exam   BP: 122/71  Heart Rate: 61  Temp:  (PAZ in triage will try in room with different thermometer)  Resp: 18  Weight: 69.9 kg (154 lb 1.6 oz)  SpO2: 98 %      Physical Exam  Physical Exam   Constitutional:   well nourished, well  developed, resting comfortably   HENT:   Head: Normocephalic and atraumatic.   Eyes: Conjunctivae are normal. Pupils are equal, round, and reactive to light. EOMI  pharynx has no erythema or exudate, mucous membranes are moist  Neck:   no adenopathy, no bony tenderness  Cardiovascular: regular rate and rhythm without murmurs or gallops  Pulmonary/Chest: Clear to auscultation bilaterally, with no wheezes or retractions. No respiratory distress.  GI: Soft with good bowel sounds.  Non-tender, non-distended, with no guarding, no rebound, no peritoneal signs.   Back:  No bony or CVA tenderness   Musculoskeletal:  no edema or clubbing   Skin: Skin is warm and dry. No rash noted.   Neurological: alert and oriented to person, place, and time. Nonfocal exam, GCS is 15, cranial nerves II through XII are grossly intact.  The patient is able to do finger to nose with both hands rapidly and accurately.  He can also do heel to urth.  Romberg is negative.  The patient is able to tiptoe and heel walk.  Psychiatric:  normal mood and affect.   ED Course   9:00 AM  The patient was seen and examined by Raven Miramontes MD in Room 3.     ED Course     Procedures             Critical Care time:  none            Results for orders placed or performed during the hospital encounter of 03/24/18 (from the past 24 hour(s))   CBC with platelets differential   Result Value Ref Range    WBC 6.7 4.0 - 11.0 10e9/L    RBC Count 4.31 (L) 4.4 - 5.9 10e12/L    Hemoglobin 13.4 13.3 - 17.7 g/dL    Hematocrit 39.8 (L) 40.0 - 53.0 %    MCV 92 78 - 100 fl    MCH 31.1 26.5 - 33.0 pg    MCHC 33.7 31.5 - 36.5 g/dL    RDW 14.3 10.0 - 15.0 %    Platelet Count 147 (L) 150 - 450 10e9/L    Diff Method Automated Method     % Neutrophils 61.0 %    % Lymphocytes 31.0 %    % Monocytes 6.6 %    % Eosinophils 1.0 %    % Basophils 0.3 %    % Immature Granulocytes 0.1 %    Nucleated RBCs 0 0 /100    Absolute Neutrophil 4.1 1.6 - 8.3 10e9/L    Absolute Lymphocytes 2.1 0.8 -  5.3 10e9/L    Absolute Monocytes 0.4 0.0 - 1.3 10e9/L    Absolute Eosinophils 0.1 0.0 - 0.7 10e9/L    Absolute Basophils 0.0 0.0 - 0.2 10e9/L    Abs Immature Granulocytes 0.0 0 - 0.4 10e9/L    Absolute Nucleated RBC 0.0    Comprehensive metabolic panel   Result Value Ref Range    Sodium 138 133 - 144 mmol/L    Potassium 4.1 3.4 - 5.3 mmol/L    Chloride 110 (H) 94 - 109 mmol/L    Carbon Dioxide 21 20 - 32 mmol/L    Anion Gap 7 3 - 14 mmol/L    Glucose 120 (H) 70 - 99 mg/dL    Urea Nitrogen 12 7 - 30 mg/dL    Creatinine 0.70 0.66 - 1.25 mg/dL    GFR Estimate >90 >60 mL/min/1.7m2    GFR Estimate If Black >90 >60 mL/min/1.7m2    Calcium 8.8 8.5 - 10.1 mg/dL    Bilirubin Total 0.3 0.2 - 1.3 mg/dL    Albumin 3.6 3.4 - 5.0 g/dL    Protein Total 7.2 6.8 - 8.8 g/dL    Alkaline Phosphatase 78 40 - 150 U/L    ALT 22 0 - 70 U/L    AST 6 0 - 45 U/L   Acetaminophen level   Result Value Ref Range    Acetaminophen Level <2 mg/L   UA reflex to Microscopic and Culture   Result Value Ref Range    Color Urine Light Yellow     Appearance Urine Clear     Glucose Urine Negative NEG^Negative mg/dL    Bilirubin Urine Negative NEG^Negative    Ketones Urine Negative NEG^Negative mg/dL    Specific Gravity Urine 1.009 1.003 - 1.035    Blood Urine Negative NEG^Negative    pH Urine 6.5 5.0 - 7.0 pH    Protein Albumin Urine Negative NEG^Negative mg/dL    Urobilinogen mg/dL Normal 0.0 - 2.0 mg/dL    Nitrite Urine Negative NEG^Negative    Leukocyte Esterase Urine Negative NEG^Negative    Source Midstream Urine         Labs Ordered and Resulted from Time of ED Arrival Up to the Time of Departure from the ED   CBC WITH PLATELETS DIFFERENTIAL - Abnormal; Notable for the following:        Result Value    RBC Count 4.31 (*)     Hematocrit 39.8 (*)     Platelet Count 147 (*)     All other components within normal limits   COMPREHENSIVE METABOLIC PANEL - Abnormal; Notable for the following:     Chloride 110 (*)     Glucose 120 (*)     All other components  within normal limits   UA MACROSCOPIC WITH REFLEX TO MICRO AND CULTURE   ACETAMINOPHEN LEVEL       Consults  Neurosurgery: Responded (03/24/18 0999)    Assessments & Plan (with Medical Decision Making)     I have reviewed the nursing notes.  Emergency Department course:  The patient was seen and examined at 0859 am.   Per review of patient's chart, patient had a MRI of his brain on 10/4/2017.  IMPRESSION:  1. Slight interval increase in transverse dimension of extra-axial mass involving the right prepontine and right cerebellopontine angle cisterns most consistent with a meningioma. Mild localized mass effect unchanged. Mild nonnodular dural enhancement involving the right posterior fossa anteriorly and the right internal auditory canal unchanged.  2. Small zone of dural thickening and dural enhancement left superior lateral frontal region unchanged on coronal images which may represent a small plaque-like meningioma.  3. No evidence for acute infarction or abnormal intra-axial enhancement.  4. Cerebral and cerebellar volume loss, mild chronic small vessel ischemic changes involving cerebral hemispheric white matter bilaterally and small zone of encephalomalacia and gliosis right superior frontal lobe unchanged.  5. Decreased mucosal thickening with new small air-fluid level left frontal sinus which could be consistent with mild acute sinusitis.  Laboratory studies today show thrombocytopenia, with platelet count of 147,000.  Patient has a slightly elevated glucose of 120 and elevated chloride of 110, but the remainder of the comprehensive metabolic panel is essentially unremarkable.  LFTs are within normal limits. Acetaminophen level is <2..  UA is nitrite and LCE negative    I consulted Neurosurgery and the patient was seen by Dr. Harman in the ED. Neurosurgery does not recommend any additional imaging.  However they do recommend a neurology consult and evaluation while the patient is in the ED.  It would be up to  neurology if the patient should be started on an antiepileptic medication.  I consulted Neurology and he was seen by Dr. Bear.  Neurology recommendations include treating the patient with  Keppra 750 mg p.o. twice daily for possible seizure.  He should follow-up in the neurology clinic for an outpatient EEG in 1-2 weeks.  The patient should not drive until follow-up warranted until cleared by neurology or neurosurgery.    Patient is a 57-year-old male with a known meningioma scheduled for removal on April 12 and an episode of transient shaking that occurred 5 days ago and has not been repeated.  This could represent seizure and needs further follow-up.  I prescribed Keppra on the advice of  Neurology and he was given the number to the neurology clinic for follow up.  Should he experience additional episodes of shaking, he should return to the ED for further evaluation.  I have reviewed the findings, diagnosis, plan and need for follow up with the patient.    New Prescriptions    LEVETIRACETAM (KEPPRA) 750 MG TABLET    Take 1 tablet (750 mg) by mouth 2 times daily       Final diagnoses:   Episode of shaking--rule out seizure   Meningioma (H)   I, Ines Reyes, am serving as a trained medical scribe to document services personally performed by Raven Miramontes MD, based on the provider's statements to me.      I, Raven Miramontes MD, was physically present and have reviewed and verified the accuracy of this note documented by Ines Reyes.   This note was created in part by the use of Dragon voice recognition dictation system. Inadvertent grammatical errors and typographical errors may still exist.  Raven Miramontes MD        3/24/2018   [facility], [place], EMERGENCY DEPARTMENT     Raven Miramontes MD  03/24/18 4549

## 2018-03-24 NOTE — ED AVS SNAPSHOT
[facility], [place], Emergency Department    [address] [place] [address]    Phone:  [phone]                                       Charles Santos   MRN: [id number]    Department:  [facility], Emergency Department   Date of Visit:  3/24/2018           After Visit Summary Signature Page     I have received my discharge instructions, and my questions have been answered. I have discussed any challenges I see with this plan with the nurse or doctor.    ..........................................................................................................................................  Patient/Patient Representative Signature      ..........................................................................................................................................  Patient Representative Print Name and Relationship to Patient    ..................................................               ................................................  Date                                            Time    ..........................................................................................................................................  Reviewed by Signature/Title    ...................................................              ..............................................  Date                                                            Time

## 2018-03-24 NOTE — CONSULTS
"[facility]       NEUROSURGERY CONSULTATION NOTE    This consultation was requested by Dr. Miramontes from the EM service.    Reason for Consultation: seizure    HPI:  Mr Soto is a 57 year old male with a known right CPA petrous mass, most c/w a meningioma. This was found on workup for onset of double vision. He is scheduled for surgical resection 4/12/18. The patient describes an episode where his head dropped to his chest and the left side of his mouth twitched on 3/20. He was aware of what was going on, but was not able to overcome it. This lasted for about 30 seconds. After the episode he felt \"off\", but returned to normal shortly. No incontinence. Has never had anything like this before. States he is feeling very stressed regarding the upcoming surgery. No other neurologic complaints. The patient was concerned that he had a seizure and wanted to get checked out.      PAST MEDICAL HISTORY:   Past Medical History:   Diagnosis Date     Brain tumor (benign) (H)      H/O magnetic resonance imaging of orbit      HIV (human immunodeficiency virus infection) (H) 1987       PAST SURGICAL HISTORY:   Past Surgical History:   Procedure Laterality Date     DENTAL SURGERY  1990       FAMILY HISTORY: No family history on file.    SOCIAL HISTORY:   Social History   Substance Use Topics     Smoking status: Current Every Day Smoker     Smokeless tobacco: Never Used     Alcohol use No       MEDICATIONS:  Current Outpatient Prescriptions   Medication Sig Dispense Refill     Rivaroxaban (XARELTO PO)        levETIRAcetam (KEPPRA) 750 MG tablet Take 1 tablet (750 mg) by mouth 2 times daily 42 tablet 0     ACYCLOVIR PO Take 400 mg by mouth       albuterol (PROAIR HFA, PROVENTIL HFA, VENTOLIN HFA) 108 (90 BASE) MCG/ACT inhaler Inhale 2 puffs into the lungs every 6 hours as needed        AMITRIPTYLINE HCL PO Take 10 mg by mouth nightly as needed        amLODIPine (NORVASC) 1 mg/mL Take 5 mg " by mouth daily        efavirenz-emtrictabine-tenofovir (ATRIPLA) 600-200-300 MG per tablet Take 1 tablet by mouth At Bedtime       fluticasone (FLONASE) 50 MCG/ACT nasal spray Spray 2 sprays into both nostrils daily       fluticasone (FLOVENT HFA) 110 MCG/ACT inhaler Inhale 2 puffs into the lungs 2 times daily as needed        LORazepam 0.5 mg/mL NON-STANDARD dilution 0.5 MG/ML SOLN oral solution Take by mouth every 6 hours as needed        metoprolol (LOPRESSOR) 10 mg/mL Take by mouth daily        Esomeprazole Magnesium (NEXIUM PO) Take by mouth every morning (before breakfast)        nicotine (NICOTROL) 10 MG inhaler Inhale 1 cartridge into the lungs daily as needed       potassium chloride SA (K-DUR,KLOR-CON M) 20 MEQ tablet Take by mouth daily Takes 2 pills once per day       sildenafil (REVATIO-VIAGRA) 2.5 mg/mL Take 20 mg by mouth every 8 hours       VITAMIN D, CHOLECALCIFEROL, PO Take 1,000 mg by mouth daily         Allergies:  Allergies   Allergen Reactions     Nevirapine      PN: LW Reaction: Burns, Blisters  1997, Rdz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  (a side effect, not a true allergy).   Abnormal behavior.  (a side effect, not a true allergy).      Penicillins Rash     1984 1984         ROS: 10 point ROS of systems including Constitutional, Eyes, Respiratory, Cardiovascular, Gastroenterology, Genitourinary, Integumentary, Muscularskeletal, Psychiatric were all negative except for pertinent positives noted in my HPI.      PE:  Blood pressure 143/81, temperature 97.8  F (36.6  C), temperature source Oral, resp. rate 18, weight 69.9 kg (154 lb 1.6 oz), SpO2 97 %.  NEUROLOGIC:  -- Awake; Alert; oriented x 3  -- Follows commands briskly  -- +repetition and naming  -- Speech fluent, spontaneous. No aphasia or dysarthria.  -- No gaze preference. No apparent hemineglect.  Cranial  Nerves:  -- Visual fields full, PERRL 3-2mm bilat and brisk, extraocular movements intact with nystagmus in left eye  -- Face symmetrical, tongue midline  -- Sensory V1-V3 intact bilaterally  -- Palate elevates symmetrically, uvula midline  -- Hearing grossly intact bilat  -- Trapezii 5/5 strength bilat symmetric  -- Cerebellar: Finger nose finger without dysmetria    Motor:  Normal bulk / tone; no tremor, rigidity, or bradykinesia.  No muscle wasting or fasciculations  No Pronator Drift     Delt Bi Tri FE IP Quad Hamst TibAnt EHL Gastroc    C5 C6 C7 C8/T1 L2 L3 L4-S1 L4 L5 S1   R 5 5 5 5 5 5 5 5 5 5   L 5 5 5 5 5 5 5 5 5 5     Sensory:  intact to LT    Reflexes:     Bi Tri BR Chaya Pat Ach Bab    C5-6 C7-8 C6 UMN L2-4 S1 UMN   R 2+ 2+ 2+ Norm 2+ 2+ Norm   L 2+ 2+ 2+ Norm 2+ 2+ Norm     Gait: Deferred      ASSESSMENT:  57 year old male with CPA petrous meningioma, scheduled for resection 4/12/18 presenting with concerns for a seizure. These masses are slow growing and very seldom cause seizures. His most MRI does not demonstrate T2 hyperintensity where the mass pushes on the brain.      RECOMMENDATIONS:  - No Neurosurgical intervention indicated at this time.  - Defer to Neurology recommendations regarding need for AEDs.  - No additional imaging required.  - The patient's surgery is to go forward as scheduled.  - No need for admission from Neurosurgical perspective.      Cesario Harman MD  Neurosurgery PGY2    The patient was discussed with Dr. Hernandez, neurosurgery chief resident, and he agrees with the above.    Agree with plan, did not see patient

## 2018-03-24 NOTE — ED NOTES
Charles presents from home with c/o a transient episode that happened on Tuesday where his head dropped and his face was twitching for approximately 30 seconds while having coffee with a friend. Denies injury or LOC during the event. He called his neurologist yesterday who recommended he be evaluated. Charles states he feels back to baseline, with no further events. History of HIV, DVTs, PE and a benign brain tumor which is affecting his vision. Scheduled for surgery 4/12 for tumor removal. Currently denies numbness/tingling, gait disturbance, difficulty speaking, worsening vision or other complaints. He does report a headache for the past two weeks since switching from warfarin to xarelto.

## 2018-03-24 NOTE — CONSULTS
"[facility]  Neurology ED Consultation    Patient Name:  Charles Santos  MRN:  [id number]    :  1960  Date of Service:  2018  Primary care provider:  Chapo Zafar      Neurology consultation service was asked to see Charles Santos by Dr. Miramontes to evaluate possible seizure.    HISTORY OF PRESENT ILLNESS:   Charles Santos is a 57 year old year old male with a known right cerebellopontine angle mass most consistent with a meningioma who presented today after an episode of the head droop with facial twitching.  History is taken from the patient as well as available notes.  The following is known:    Patient notes that on Tuesday this week- 4 days ago- he was at a café with a friend when he noted acute onset of his head drooping forward followed quickly by the left side of his face twitching.  He states that this lasted roughly 20-30 seconds.  He did not lose consciousness.  He does not think he was in complete control and relates a sensation of feeling \"off\" as well as feeling \"out of body\".  He is unsure if he could have forcibly moved his head up.  He denies any associated body shaking.  He does note that he was at a particularly stressful moment in his conversation with his friend.  This has never happened before, and has not happened since.  He states he is only in the ED today after being told to present via conversation with an outside neurologist.    Patient denies any recent illnesses or other changes.  He denies ever waking up with bowel or bladder incontinence or bit tongue.  No seizures as a child or family history of seizures that he is aware of.  No other change.  He is followed by Dr. Galvin in neurosurgery.  He is scheduled to have surgery on  to remove the mass.  He was seen by neurosurgery during this consult.  He denies any other concerns.    REVIEW OF SYSTEMS:  A 10 point review of symptoms was negative except as indicated " above in the HPI    ALLERGIES:  Allergies   Allergen Reactions     Nevirapine      PN: LW Reaction: Burns, Blisters  1997, Rdz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  (a side effect, not a true allergy).   Abnormal behavior.  (a side effect, not a true allergy).      Penicillins Rash     1984 1984     MEDICATIONS:  Current Outpatient Prescriptions   Medication Sig Dispense Refill     Rivaroxaban (XARELTO PO)        ACYCLOVIR PO Take 400 mg by mouth       albuterol (PROAIR HFA, PROVENTIL HFA, VENTOLIN HFA) 108 (90 BASE) MCG/ACT inhaler Inhale 2 puffs into the lungs every 6 hours as needed        AMITRIPTYLINE HCL PO Take 10 mg by mouth nightly as needed        amLODIPine (NORVASC) 1 mg/mL Take 5 mg by mouth daily        efavirenz-emtrictabine-tenofovir (ATRIPLA) 600-200-300 MG per tablet Take 1 tablet by mouth At Bedtime       fluticasone (FLONASE) 50 MCG/ACT nasal spray Spray 2 sprays into both nostrils daily       fluticasone (FLOVENT HFA) 110 MCG/ACT inhaler Inhale 2 puffs into the lungs 2 times daily as needed        LORazepam 0.5 mg/mL NON-STANDARD dilution 0.5 MG/ML SOLN oral solution Take by mouth every 6 hours as needed        metoprolol (LOPRESSOR) 10 mg/mL Take by mouth daily        Esomeprazole Magnesium (NEXIUM PO) Take by mouth every morning (before breakfast)        nicotine (NICOTROL) 10 MG inhaler Inhale 1 cartridge into the lungs daily as needed       potassium chloride SA (K-DUR,KLOR-CON M) 20 MEQ tablet Take by mouth daily Takes 2 pills once per day       sildenafil (REVATIO-VIAGRA) 2.5 mg/mL Take 20 mg by mouth every 8 hours       VITAMIN D, CHOLECALCIFEROL, PO Take 1,000 mg by mouth daily       PAST MEDICAL HISTORY:  Past Medical History:   Diagnosis Date     Brain tumor (benign) (H)      H/O magnetic resonance imaging of orbit      HIV (human immunodeficiency virus infection)  (H) 1987     PAST SURGICAL HISTORY:  Past Surgical History:   Procedure Laterality Date     DENTAL SURGERY  1990     SOCIAL HISTORY:  Social History     Social History     Marital status: Single     Spouse name: N/A     Number of children: N/A     Years of education: N/A     Occupational History     Not on file.     Social History Main Topics     Smoking status: Current Every Day Smoker     Smokeless tobacco: Never Used     Alcohol use No     Drug use: No     Sexual activity: No     Other Topics Concern     Not on file     Social History Narrative     FAMILY HISTORY:  No family history on file.      PHYSICAL EXAMINATION:    Vitals:   /82  Temp 97.8  F (36.6  C) (Oral)  Resp 18  Wt 69.9 kg (154 lb 1.6 oz)  SpO2 97%  BMI 22.76 kg/m2    -General: Man sitting comfortably on the chair. No acute distress    -HEENT: No skin discolorations noted, no carotid bruits     -Chest: RRR without murmurs or bruits     -Abdomen: Positive bowel sounds, soft, non-tender, no organomegaly    -Musculoskeletal: No abnormalities noted     -Neurological:     --MS: Patient is alert, attentive, and oriented. Speech is clear and fluid. Names normally. Registration, recall and remote memory intact.    --CNs: Visual fields are full to confrontation. Normal fundoscopic exam with no visualized vascular changes. Pupils are briskly reactive to light. Visual fields full. Ocular motility full without nystagmus, facial sensation intact, muscles of mastication and facial expression normal, hearing intact, gag and palate elevation normal, sternomastoid and trapezius function normal, tongue motions normal     --Motor: Normal muscle tone and bulk. 5/5 muscle strength bilaterally     --Reflexes: 1+ reflexes at knees and biceps and ankles. Plantar responses flexor bilaterally    --Sensory: Light touch, vibration and PP intact bilaterally in upper and lower extremities     --Coordination: Rapidly alternating movements of fingers intact. Heel-shin  and finger-nose-finger is intact. Negative Romberg.     --Gait: Stands with feet normally spaced. Gait is steady.  Able to walk on toes.      INVESTIGATIONS:   All available and relevant labs, imaging, and other procedures were reviewed.       IMPRESSION   57 year old year old male with known right cerebellopontine angle mass most consistent with a meningioma who presented today after an episode of the head droop with facial twitching that occurred on 3/20/2018.  The episode lasted roughly 30 seconds and involved left facial twitching as well as a head droop.  No loss of consciousness.  No associated bowel or bladder or bit tongue.  She question is whether or not this episode represented a seizure.  Unfortunately, it is not possible to state that with certainty whether or not this episode represented a seizure.  He does have a known mass consistent with meningioma and relates ongoing stressors related both to the fact of having this mass, as well as 2 other nonrelated issues in his life at this time.  He was also seen by neurosurgery during this admission; they did not consider further workup and will see him as already scheduled in early April for his presurgical evaluation and then surgery on April 12.  From neurology standpoint, given that there is still potential that this was a seizure, would start him on Keppra in the interim.  Would also perform an outpatient EEG and have him follow-up in clinic.  There are no focal deficits or other changes that would suggest imaging is necessary at this time.  He will have imaging shortly as part of his presurgical evaluation.    RECOMMENDATIONS:  -Start Keppra at 750 mg twice daily  -Outpatient EEG  -Please have patient follow-up in general neurology clinic at earliest convenience  -No acute need for imaging from neurology perspective given his story and lack of focal deficits or other change on examination  -Remainder of care as per his primary team and any other  recommendations from his neurosurgeon      Thank you for involving neurology in the care of Charles Santos.  Please do not hesitate to call with questions/concerns (consult pager [phone]).      Patient was seen and discussed with attending neurologist, Dr. Jairo Leonardo.    Elaine Bear MD  Neurology PGY3  Pager: [phone]    Discussed patient with Dr. Bear  I did not see this patient but agree with the plan  Jairo leonardo md  March 24, 2018

## 2018-04-02 ENCOUNTER — APPOINTMENT (OUTPATIENT)
Dept: SURGERY | Facility: CLINIC | Age: 58
End: 2018-04-02
Payer: MEDICARE

## 2018-04-02 ENCOUNTER — OFFICE VISIT (OUTPATIENT)
Dept: SURGERY | Facility: CLINIC | Age: 58
End: 2018-04-02
Payer: MEDICARE

## 2018-04-02 ENCOUNTER — TELEPHONE (OUTPATIENT)
Dept: NEUROLOGY | Facility: CLINIC | Age: 58
End: 2018-04-02

## 2018-04-02 ENCOUNTER — ANESTHESIA EVENT (OUTPATIENT)
Dept: SURGERY | Facility: CLINIC | Age: 58
DRG: 025 | End: 2018-04-02
Payer: MEDICARE

## 2018-04-02 ENCOUNTER — OFFICE VISIT (OUTPATIENT)
Dept: AUDIOLOGY | Facility: CLINIC | Age: 58
End: 2018-04-02
Payer: MEDICARE

## 2018-04-02 ENCOUNTER — ALLIED HEALTH/NURSE VISIT (OUTPATIENT)
Dept: SURGERY | Facility: CLINIC | Age: 58
End: 2018-04-02
Payer: MEDICARE

## 2018-04-02 VITALS
HEIGHT: 69 IN | SYSTOLIC BLOOD PRESSURE: 113 MMHG | HEART RATE: 62 BPM | RESPIRATION RATE: 16 BRPM | TEMPERATURE: 97.7 F | OXYGEN SATURATION: 95 % | WEIGHT: 149.5 LBS | BODY MASS INDEX: 22.14 KG/M2 | DIASTOLIC BLOOD PRESSURE: 65 MMHG

## 2018-04-02 DIAGNOSIS — Z01.818 PREOP GENERAL PHYSICAL EXAM: ICD-10-CM

## 2018-04-02 DIAGNOSIS — Z01.818 PREOP GENERAL PHYSICAL EXAM: Primary | ICD-10-CM

## 2018-04-02 DIAGNOSIS — H90.A32 MIXED CONDUCTIVE AND SENSORINEURAL HEARING LOSS OF LEFT EAR WITH RESTRICTED HEARING OF RIGHT EAR: ICD-10-CM

## 2018-04-02 DIAGNOSIS — H90.A22 SENSORINEURAL HEARING LOSS (SNHL) OF LEFT EAR WITH RESTRICTED HEARING OF RIGHT EAR: ICD-10-CM

## 2018-04-02 LAB
ANION GAP SERPL CALCULATED.3IONS-SCNC: 4 MMOL/L (ref 3–14)
APTT PPP: 29 SEC (ref 22–37)
BUN SERPL-MCNC: 14 MG/DL (ref 7–30)
CALCIUM SERPL-MCNC: 9.1 MG/DL (ref 8.5–10.1)
CHLORIDE SERPL-SCNC: 108 MMOL/L (ref 94–109)
CO2 SERPL-SCNC: 25 MMOL/L (ref 20–32)
CREAT SERPL-MCNC: 0.84 MG/DL (ref 0.66–1.25)
ERYTHROCYTE [DISTWIDTH] IN BLOOD BY AUTOMATED COUNT: 14 % (ref 10–15)
GFR SERPL CREATININE-BSD FRML MDRD: >90 ML/MIN/1.7M2
GLUCOSE SERPL-MCNC: 110 MG/DL (ref 70–99)
HCT VFR BLD AUTO: 40.9 % (ref 40–53)
HGB BLD-MCNC: 13.6 G/DL (ref 13.3–17.7)
INR PPP: 0.96 (ref 0.86–1.14)
MCH RBC QN AUTO: 30.9 PG (ref 26.5–33)
MCHC RBC AUTO-ENTMCNC: 33.3 G/DL (ref 31.5–36.5)
MCV RBC AUTO: 93 FL (ref 78–100)
PLATELET # BLD AUTO: 175 10E9/L (ref 150–450)
POTASSIUM SERPL-SCNC: 4.2 MMOL/L (ref 3.4–5.3)
RBC # BLD AUTO: 4.4 10E12/L (ref 4.4–5.9)
SODIUM SERPL-SCNC: 138 MMOL/L (ref 133–144)
WBC # BLD AUTO: 7 10E9/L (ref 4–11)

## 2018-04-02 RX ORDER — TRETINOIN 0.5 MG/G
CREAM TOPICAL WEEKLY
COMMUNITY
Start: 2017-11-16 | End: 2024-07-26

## 2018-04-02 RX ORDER — ACYCLOVIR 400 MG/1
800 TABLET ORAL EVERY EVENING
COMMUNITY
Start: 2017-06-27

## 2018-04-02 RX ORDER — ESOMEPRAZOLE MAGNESIUM 40 MG/1
40 CAPSULE, DELAYED RELEASE ORAL EVERY EVENING
COMMUNITY
Start: 2018-02-16

## 2018-04-02 RX ORDER — AMLODIPINE BESYLATE 10 MG/1
10 TABLET ORAL EVERY EVENING
COMMUNITY
Start: 2017-06-27

## 2018-04-02 RX ORDER — POLYVINYL ALCOHOL 14 MG/ML
SOLUTION/ DROPS OPHTHALMIC EVERY MORNING
COMMUNITY
Start: 2017-05-26 | End: 2024-07-26

## 2018-04-02 RX ORDER — MULTIVIT WITH MINERALS/LUTEIN
2000 TABLET ORAL EVERY EVENING
COMMUNITY
Start: 2016-11-30 | End: 2024-07-26

## 2018-04-02 RX ORDER — METOPROLOL TARTRATE 100 MG
100 TABLET ORAL EVERY EVENING
COMMUNITY
Start: 2017-06-27

## 2018-04-02 ASSESSMENT — LIFESTYLE VARIABLES: TOBACCO_USE: 1

## 2018-04-02 NOTE — OR NURSING
"Pt in PAC for H&P. I went in while the VISHNU provider was in the room talking with the pt after his physical exam. The pt said he received a message about needing another test before surgery. He pulled out his phone and put it on speaker. The message was from Mera Solis asking him to return the call. There is a note from Mera Solis in the chart regarding a pre-op EEG. The VISHNU explained this to him and he became anxious, then agitated and said \" I'm probably going to cancel both these appointments ( with the neurologist and for the EEG). The VISHNU explained why he should keep his appt with the neurologist on 4/5/18 and the pt seemed less anxious. The RN phone call in person went okay but at the end, the pt pulled out his phone to listen to the message from Mera Solis again. He stood up and said \" I'm not going to that EEG and probably not to the neurologist either. I don't know why I even came today. I don't care if they don't want to do the surgery if I don't have the EEG. He walked out of the room abruptly, putting one sleeve of his jacket on and not the other and walked rapidly out of the clinic and down the [address]. García Sanz, VISHNU notified.  "

## 2018-04-02 NOTE — ANESTHESIA PREPROCEDURE EVALUATION
Anesthesia Evaluation     . Pt has had prior anesthetic. Type: General           ROS/MED HX    ENT/Pulmonary:     (+)tobacco use, Current use 1 PPD for 25 years packs/day  , . .    Neurologic:  - neg neurologic ROS     Cardiovascular:     (+) hypertension----. : . . . :. . Previous cardiac testing date:results:date: results:ECG reviewed date:2/16/2018 results:Sinus khari date: results:          METS/Exercise Tolerance:  >4 METS   Hematologic:     (+) History of blood clots pt is anticoagulated, -      Musculoskeletal:  - neg musculoskeletal ROS       GI/Hepatic:     (+) GERD Asymptomatic on medication,       Renal/Genitourinary:  - ROS Renal section negative       Endo:  - neg endo ROS       Psychiatric:         Infectious Disease:   (+) HIV,       Malignancy:   (+) Malignancy History of Neuro  Neuro CA Active status post.          Other:    (+) No chance of pregnancy C-spine cleared: N/A, no H/O Chronic Pain,no other significant disability                    Physical Exam  Normal systems: cardiovascular and pulmonary    Airway   Mallampati: II  TM distance: >3 FB  Neck ROM: full    Dental   (+) upper dentures and lower dentures    Cardiovascular   Rhythm and rate: regular and normal      Pulmonary    breath sounds clear to auscultation    Other findings:   Results for JESSIE OSWALD (MRN [id number]) as of 4/2/2018 13:59    4/2/2018 11:37  Sodium: 138  Potassium: 4.2  Chloride: 108  Carbon Dioxide: 25  Urea Nitrogen: 14  Creatinine: 0.84  GFR Estimate: >90  GFR Estimate If Black: >90  Calcium: 9.1  Anion Gap: 4  Glucose: 110 (H)  WBC: 7.0  Hemoglobin: 13.6  Hematocrit: 40.9  Platelet Count: 175  RBC Count: 4.40  MCV: 93  MCH: 30.9  MCHC: 33.3  RDW: 14.0  INR: 0.96  PTT: 29  ABO: A  RH(D): Pos  Antibody Screen: Neg  Test Valid Only At: [facility]             PAC Discussion and Assessment    ASA Classification: 3  Case is suitable for: [place]  Anesthetic techniques and relevant risks discussed:  GA  Invasive monitoring and risk discussed: Yes  Types:   Possibility and Risk of blood transfusion discussed: Yes  NPO instructions given:   Additional anesthetic preparation and risks discussed:   Needs early admission to pre-op area:   Other:     PAC Resident/NP Anesthesia Assessment:  Charles Santos is a 56 yo male scheduled for Suboccipital Resection Right Cerebellopontine Angle Meningioma on 4/12/2018 by Dr. Galvin in treatment of meningioma      Previous anesthesia without complications.  1) Cardiac: HTN, well managed with amlodipine and metoprolol. EKG 2/16/2018 sinus khari.  2) Pulmonary: PE in 2005, lifelong Coumadin that was switched to xarelto last month. He will hold this 72 hours per neurosurgery team. Current smoker and has smoked 1 PPD for 25 years. Uses albuterol about once a month and denies cough, shortness of breath or dyspnea  3) GI: GERD, well managed with nexium  4) Neuro: CPA petrous mass, most c/w a meningioma. This was found on workup for onset of double vision.  He was in ER  3/24/2018 after an episode of the head droop with facial twitching, seen by neurologist and no immediate testing done, but EEG recommended outpatient. He was started on Keppra, but states that he has not been taking it.    MRI 10/2017  1. Slight interval increase in transverse dimension of extra-axial mass involving the right prepontine and right cerebellopontine angle cisterns most consistent with a meningioma. Mild localized mass effect unchanged. Mild nonnodular dural enhancement involving the right posterior fossa anteriorly and the right internal auditory canal unchanged.  2. Small zone of dural thickening and dural enhancement left superior lateral frontal region unchanged on coronal images which may represent a small plaque-like meningioma.  3. No evidence for acute infarction or abnormal intra-axial enhancement.  4. Cerebral and cerebellar volume loss, mild chronic small vessel ischemic changes involving  cerebral hemispheric white matter bilaterally and small zone of encephalomalacia and gliosis right superior frontal lobe unchanged.  5. Decreased mucosal thickening with new small air-fluid level left frontal sinus which could be consistent with mild acute sinusitis.      5) Immune: HIV + with stable counts     METS well over 4    Feasible airway, upper and lower dentures         I spent 30 minutes with patient, greater than 50% educating on preop meds, counseling on anesthesia and coordinating care for neurosurgery         Reviewed and Signed by PAC Mid-Level Provider/Resident  Mid-Level Provider/Resident: TERESA Winn  Date: 4/2/2018  Time: 1200    Attending Anesthesiologist Anesthesia Assessment:        Anesthesiologist:   Date:   Time:   Pass/Fail:   Disposition:     PAC Pharmacist Assessment:        Pharmacist:   Date:   Time:      Anesthesia Plan      History & Physical Review  History and physical reviewed and following examination; no interval change.    ASA Status:  3 .    NPO Status:  > 8 hours    Plan for General and ETT with Propofol induction. Maintenance will be Balanced.    PONV prophylaxis:  Ondansetron (or other 5HT-3) and Dexamethasone or Solumedrol  Additional equipment: 2nd IV and Arterial Line Plan for neuromonitoring, therefore remifentanil and propofol infusions will be utilized.      Postoperative Care  Postoperative pain management:  IV analgesics.      Consents  Anesthetic plan, risks, benefits and alternatives discussed with:  Patient..                          .

## 2018-04-02 NOTE — TELEPHONE ENCOUNTER
"Called patient back. Explained the importance of him having the EEG. Patient stated that the doctors all just want to make money, that there is no reason to have the EEG nor see Dr. Pisano since \"They\" already ruled out the seizures. He stated that it says this on his discharge paperwork from the emergency department. Will forward to Dr. Thompson's nurse since he is having surgery on 4/12 with Dr. Galvin.  "

## 2018-04-02 NOTE — MR AVS SNAPSHOT
After Visit Summary   4/2/2018    Charles Santos    MRN: [id number]           Patient Information     Date Of Birth          1960        Visit Information        Provider Department      4/2/2018 9:00 AM Clementina Booth AuD [facility] Audiology        Today's Diagnoses     Sensorineural hearing loss (SNHL) of left ear with restricted hearing of right ear        Mixed conductive and sensorineural hearing loss of left ear with restricted hearing of right ear           Follow-ups after your visit        Your next 10 appointments already scheduled     Apr 02, 2018 10:00 AM CDT   (Arrive by 9:45 AM)   PAC EVALUATION with  Pac Jordan 7   [facility] ([facility])    [address] [place] [address]  4th [facility] [address]   [phone]            Apr 02, 2018 11:00 AM CDT   (Arrive by 10:45 AM)   PAC RN ASSESSMENT with  Pac Rn   [facility] Preoperative Assessment [place] ([facility])    [address] [place] [address]   [phone]            Apr 02, 2018 11:20 AM CDT   (Arrive by 11:05 AM)   PAC Anesthesia Consult with  Pac Anesthesiologist   [facility]operative [facility] [place] ([facility])    [address] [place] [address]   [phone]            Apr 02, 2018 11:30 AM CDT   (Arrive by 11:15 AM)   PAC Pharmacist with  Pac Pharmacist   [facility] ([facility])    [address] [place] [address]   [phone]            Apr 02, 2018 12:00 PM CDT   LAB with  LAB   [facility] Lab [facility])    [address] [place] [address]   [phone]           Please do not eat 10-12 hours before your appointment if you are coming in fasting for labs on lipids, cholesterol, or glucose (sugar). This does not apply to  pregnant women. Water, hot tea and black coffee (with nothing added) are okay. Do not drink other fluids, diet soda or chew gum.            Apr 05, 2018  2:25 PM CDT   (Arrive by 2:10 PM)   New Patient Visit with Johnathon Pisano MD   [facility] ([facility] [place])    [address]  3rd [facility] [address]   [phone]            Apr 11, 2018 10:00 AM CDT   (Arrive by 9:45 AM)   [facility] Routine Visit with  EEG TECH 2   [facility] OUT[facility]IENT ([facility])    [address]  [address] [facility] [address]   [phone]            Apr 12, 2018  9:20 AM CDT   (Arrive by 9:05 AM)   CT HEAD W CONTRAST with UUCT4   [facility], [place], [place] ([facility], [facility])    [address] [facility] [address]   [phone]           Please bring any scans or X-rays taken at other hospitals, if similar tests were done. Also bring a list of your medicines, including vitamins, minerals and over-the-counter drugs. It is safest to leave personal items at home.  Be sure to tell your doctor:   If you have any allergies.   If there s any chance you are pregnant.   If you are breastfeeding.    If you have diabetes as your medication may need to be adjusted for this exam.  You will have contrast for this exam. To prepare:   Do not eat or drink for 2 hours before your exam. If you need to take medicine, you may take it with small sips of water. (We may ask you to take liquid medicine as well.)   The day before your exam, drink extra fluids at least six 8-ounce glasses (unless your doctor tells you to restrict your fluids).  Patients over 70 or patients with diabetes or kidney problems:   If you haven t had a blood test (creatinine test) within the last 30 days, the Cardiologist/Radiologist may require you to get this test prior to your exam.  Please wear loose clothing, such as a sweat suit or jogging clothes. Avoid  snaps, zippers and other metal. We may ask you to undress and put on a hospital gown.  If you have any questions, please call the Imaging Department where you will have your exam.            2018   Procedure with Azeem Galvin MD   [facility], [place], Same Day Surgery (--)    [address]  [place] [address] [phone]              Who to contact     Please call your clinic at [phone] to:    Ask questions about your health    Make or cancel appointments    Discuss your medicines    Learn about your test results    Speak to your doctor            Additional Information About Your Visit        HealthHiwayharRegisterPatient Information     3scale is an electronic gateway that provides easy, online access to your medical records. With 3scale, you can request a clinic appointment, read your test results, renew a prescription or communicate with your care team.     To sign up for 3scale visit the website at [email or web address]   You will be asked to enter the access code listed below, as well as some personal information. Please follow the directions to create your username and password.     Your access code is: [email or web address]  Expires: 2018  7:30 AM     Your access code will  in 90 days. If you need help or a new code, please contact your [facility] or call [phone] for assistance.        Care EveryWhere ID     This is your Care EveryWhere ID. This could be used by other organizations to access your [place] medical records  [id number]         Blood Pressure from Last 3 Encounters:   18 143/81   18 118/59   16 137/74    Weight from Last 3 Encounters:   18 69.9 kg (154 lb 1.6 oz)   18 67 kg (147 lb 9.6 oz)   16 73.5 kg (162 lb)              We Performed the Following     AUDIOGRAM/TYMPANOGRAM - INTERFACE        Primary Care Provider Office Phone # Fax #    Chapo Zafar [phone]       [facility]  [address] [place] [address]        Equal Access to Services     BHARGAVIDAVID HUI : Hadii aad ku hadmarthao Sochristianaali, waaxda luqadaha, qaybta kaalmada kevinkatelingermán, vineet jimenez jostineleuterio mckeonterrikari luna . So [facility] [phone].    ATENCIÓN: Si habla español, tiene a jaime disposición servicios gratuitos de asistencia lingüística. Llame al [phone].    We comply with applicable federal civil rights laws and [place] laws. We do not discriminate on the basis of race, color, national origin, age, disability, sex, sexual orientation, or gender identity.            Thank you!     Thank you for choosing [facility] AUDIOLOGY  for your care. Our goal is always to provide you with excellent care. Hearing back from our patients is one way we can continue to improve our services. Please take a few minutes to complete the written survey that you may receive in the mail after your visit with us. Thank you!             Your Updated Medication List - Protect others around you: Learn how to safely use, store and throw away your medicines at www.disposemymeds.org.          This list is accurate as of 4/2/18  9:55 AM.  Always use your most recent med list.                   Brand Name Dispense Instructions for use Diagnosis    ACYCLOVIR PO      Take 400 mg by mouth        albuterol 108 (90 BASE) MCG/ACT Inhaler    PROAIR HFA/PROVENTIL HFA/VENTOLIN HFA     Inhale 2 puffs into the lungs every 6 hours as needed        AMITRIPTYLINE HCL PO      Take 10 mg by mouth nightly as needed        amLODIPine 1 mg/mL Susp    NORVASC     Take 5 mg by mouth daily        efavirenz-emtrictabine-tenofovir 600-200-300 MG per tablet    ATRIPLA     Take 1 tablet by mouth At Bedtime        fluticasone 110 MCG/ACT Inhaler    FLOVENT HFA     Inhale 2 puffs into the lungs 2 times daily as needed        fluticasone 50 MCG/ACT spray    FLONASE     Spray 2 sprays into both nostrils daily        levETIRAcetam 750 MG tablet    KEPPRA    42 tablet     Take 1 tablet (750 mg) by mouth 2 times daily        LORazepam 0.5 mg/mL NON-STANDARD dilution 0.5 MG/ML Soln solution      Take by mouth every 6 hours as needed        metoprolol 10 mg/mL Susp    LOPRESSOR     Take by mouth daily        NEXIUM PO      Take by mouth every morning (before breakfast)        nicotine 10 MG Inhaler    NICOTROL     Inhale 1 cartridge into the lungs daily as needed        potassium chloride SA 20 MEQ CR tablet    K-DUR/KLOR-CON M     Take by mouth daily Takes 2 pills once per day        sildenafil 2.5 mg/mL Syringe    REVATIO     Take 20 mg by mouth every 8 hours        VITAMIN D (CHOLECALCIFEROL) PO      Take 1,000 mg by mouth daily        XARELTO PO

## 2018-04-02 NOTE — PATIENT INSTRUCTIONS
Preparing for Your Surgery      Name:  Charles Santos   MRN:  [id number]   :  1960   Today's Date:  2018     Arriving for surgery:  Surgery date:  1218  Surgery time:  10:10 am  Arrival time:  7:20 am  Please come to:       [facility] [address]  [address] [place]  [address]    -   parking is available in front of the hospital from 5:15 am to 8:00 pm    -  Stop at the Information Desk in the lobby    -   Inform the information person that you are here for surgery. An escort to 3c will be provided. If you would not like an escort, please proceed to 3C on the 3rd floor. [phone]     What can I eat or drink?  -  You may have solid food or milk products until 8 hours prior to your surgery. No food after midnight.  -  You may have water, apple juice  ( No other kind of juice) or 7up/Sprite until 2 hours prior to your surgery. Stop clear fluids at 7:20 am.    Which medicines can I take? No Aspirin, vitamins or supplements for 7 days before surgery. Hold Xarelto as instructed . Last evening dose of Xarelto will be 18 then no more before surgery    -  It is OKAY to take these medications: Liquifilm tears, Albuterol inhaler if needed, Flonase nasal spray if needed      How do I prepare myself?  -  Take two showers: one the night before surgery; and one the morning of surgery.         Use 1/2 bottle of Scrubcare or Hibiclens to wash from neck down for each shower..  You may use your own shampoo and conditioner. No other hair products like gel.   -  Do NOT use lotion, powder, deodorant, or antiperspirant the day of your surgery.  -  Do NOT wear any jewelry.  -Do not bring your own medications to the hospital, except for eye drops.  -  Bring your ID and insurance card.    Questions or Concerns:  If you have questions or concerns, please call the  Preoperative Assessment Center, Monday-Friday 7AM-7PM:  [phone]    AFTER YOUR SURGERY  Breathing exercises    Breathing exercises help you recover faster. Take deep breaths and let the air out slowly. This will:     Help you wake up after surgery.    Help prevent complications like pneumonia.  Preventing complications will help you go home sooner.   We may give you a breathing device (incentive spirometer) to encourage you to breathe deeply.   Nausea and vomiting   You may feel sick to your stomach after surgery; if so, let your nurse know.    Pain control:  After surgery, you may have pain. Our goal is to help you manage your pain. Pain medicine will help you feel comfortable enough to do activities that will help you heal.  These activities may include breathing exercises, walking and physical therapy.   To help your health care team treat your pain we will ask: 1) If you have pain  2) where it is located 3) describe your pain in your words  Methods of pain control include medications given by mouth, vein or by nerve block for some surgeries.  We may give you a pain control pump that will:  1) Deliver the medicine through a tube placed in your vein  2) Control the amount of medicine you receive  3) Allow you to push a button to deliver a dose of pain medicine  Sequential Compression Device (SCD) or Pneumo Boots:  You may need to wear SCD S on your legs or feet. These are wraps connected to a machine that pumps in air and releases it. The repeated pumping helps prevent blood clots from forming.

## 2018-04-02 NOTE — H&P
Pre-Operative H & P     CC:  Preoperative exam to assess for increased cardiopulmonary risk while undergoing surgery and anesthesia.    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 Galvin in treatment of meningioma at [facility].     History is obtained from the patient.   CPA petrous mass, most c/w a meningioma. This was found on workup for onset of double vision.  He was in ER  3/24/2018 after an episode of the head droop with facial twitching, seen by neurologist and no immediate testing done, but EEG recommended outpatient. He has had any recurrence and no neurological symptoms.      Past Medical History  Past Medical History:   Diagnosis Date     GERD (gastroesophageal reflux disease)      HIV (human immunodeficiency virus infection) (H) 1987     HTN (hypertension)      Meningioma (H)      Personal history of PE (pulmonary embolism)     2005, currently on xarelto       Past Surgical History  Past Surgical History:   Procedure Laterality Date     DENTAL SURGERY  1990       Hx of Blood transfusions/reactions: none    Hx of abnormal bleeding or anti-platelet use: xarelto, on hold 72 hours    Menstrual history: No LMP for male patient.:     Steroid use in the last year: none    Personal or FH with difficulty with Anesthesia:  None        Prior to Admission Medications  Current Outpatient Prescriptions   Medication Sig Dispense Refill     ascorbic acid (VITAMIN C) 1000 MG TABS Take 2,000 mg by mouth every evening Oh hold since 03/10/2018       MAGNESIUM PO Take 250 mg by mouth every evening Oh hold since 03/10/2018       DOCOSAHEXAENOIC ACID PO Take 1 capsule by mouth every evening Oh hold since  03/10/2018       polyvinyl alcohol (LIQUIFILM TEARS) 1.4 % ophthalmic solution every morning       tretinoin (RETIN-A) 0.05 % cream Apply topically once a week       metoprolol tartrate (LOPRESSOR) 100 MG tablet Take 100 mg by mouth every evening       esomeprazole (NEXIUM) 40 MG CR capsule Take 40 mg by mouth every evening       acyclovir (ZOVIRAX) 400 MG tablet Take 800 mg by mouth every evening       amLODIPine (NORVASC) 10 MG tablet Take 10 mg by mouth every evening       efavirenz-emtrictabine-tenofovir (ATRIPLA) 600-200-300 MG per tablet Take 1 tablet by mouth every evening       albuterol (PROAIR HFA, PROVENTIL HFA, VENTOLIN HFA) 108 (90 BASE) MCG/ACT inhaler Inhale 2 puffs into the lungs every 6 hours as needed        fluticasone (FLONASE) 50 MCG/ACT nasal spray Spray 2 sprays into both nostrils as needed        potassium chloride SA (K-DUR,KLOR-CON M) 20 MEQ tablet Take 40 mEq by mouth every evening Oh hold since 03/10/2018       VITAMIN D, CHOLECALCIFEROL, PO Take 1,000 mg by mouth every evening Oh hold since 03/10/2018       Rivaroxaban (XARELTO PO) Take 20 mg by mouth every evening        [DISCONTINUED] ACYCLOVIR PO Take 800 mg by mouth every evening        [DISCONTINUED] amLODIPine (NORVASC) 1 mg/mL Take 5 mg by mouth every evening        [DISCONTINUED] efavirenz-emtrictabine-tenofovir (ATRIPLA) 600-200-300 MG per tablet Take 1 tablet by mouth every evening        [DISCONTINUED] metoprolol (LOPRESSOR) 10 mg/mL Take by mouth every evening          Allergies  Allergies   Allergen Reactions     Nevirapine      PN: LW Reaction: Burns, Blisters  1997, Rdz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  (a side effect, not a true allergy).   Abnormal behavior.  (a side effect, not a true allergy).      Penicillins Rash     1984 1984       Social History  Social History     Social History      "Marital status: Single     Spouse name: N/A     Number of children: N/A     Years of education: N/A     Occupational History     Not on file.     Social History Main Topics     Smoking status: Current Every Day Smoker     Packs/day: 1.00     Years: 25.00     Smokeless tobacco: Never Used     Alcohol use No     Drug use: No     Sexual activity: No     Other Topics Concern     Not on file     Social History Narrative       Family History  No family history on file.          Anesthesia Evaluation     . Pt has had prior anesthetic. Type: General           ROS/MED HX    ENT/Pulmonary:     (+)tobacco use, Current use 1 PPD for 25 years packs/day  , . .    Neurologic:  - neg neurologic ROS     Cardiovascular:     (+) hypertension----. : . . . :. . Previous cardiac testing date:results:date: results:ECG reviewed date:2/16/2018 results: date: results:          METS/Exercise Tolerance:  >4 METS   Hematologic:     (+) History of blood clots pt is anticoagulated, -      Musculoskeletal:         GI/Hepatic:     (+) GERD Asymptomatic on medication,       Renal/Genitourinary:  - ROS Renal section negative       Endo:  - neg endo ROS       Psychiatric:         Infectious Disease:   (+) HIV,       Malignancy:   (+) Malignancy History of Neuro  Neuro CA Active status post.          Other:    (+) No chance of pregnancy C-spine cleared: N/A, no H/O Chronic Pain,no other significant disability              Physical Exam  Normal systems: cardiovascular and pulmonary    Airway   Mallampati: II  TM distance: >3 FB  Neck ROM: full    Dental   (+) upper dentures and lower dentures    Cardiovascular   Rhythm and rate: regular and normal      Pulmonary    breath sounds clear to auscultation          The complete review of systems is negative other than noted in the HPI or here.   Temp: 97.7  F (36.5  C) Temp src: Oral BP: 113/65 Pulse: 62   Resp: 16 SpO2: 95 %         149 lbs 8 oz  5' 9\"   Body mass index is 22.08 kg/(m^2).       Physical " Exam  Constitutional: Awake, alert, cooperative, no apparent distress, and appears stated age.  Eyes: Pupils equal, round and reactive to light, extra ocular muscles intact, sclera clear, conjunctiva normal.  HENT: Normocephalic, oral pharynx with moist mucus membranes, good dentition. No goiter appreciated.   Respiratory: Clear to auscultation bilaterally, no crackles or wheezing.  Cardiovascular: Regular rate and rhythm, normal S1 and S2, and no murmur noted.  Carotids +2, no bruits. No edema. Palpable pulses to radial  DP and PT arteries.   GI: Normal bowel sounds, soft, non-distended, non-tender, no masses palpated, no hepatosplenomegaly.  Lymph/Hematologic: No cervical lymphadenopathy and no supraclavicular lymphadenopathy.  Genitourinary:  Deferred   Skin: Warm and dry.  No rashes at anticipated surgical site.   Musculoskeletal: Full ROM of neck. There is no redness, warmth, or swelling of the joints. Gross motor strength is normal.    Neurologic: Awake, alert, oriented to name, place and time. Cranial nerves II-XII are grossly intact. Gait is normal.   Neuropsychiatric: Calm, cooperative. Normal affect.     Labs: (personally reviewed)  Results for JESSIE OSWALD (MRN [id number]) as of 4/2/2018 13:59   Ref. Range 4/2/2018 11:37   Sodium Latest Ref Range: 133 - 144 mmol/L 138   Potassium Latest Ref Range: 3.4 - 5.3 mmol/L 4.2   Chloride Latest Ref Range: 94 - 109 mmol/L 108   Carbon Dioxide Latest Ref Range: 20 - 32 mmol/L 25   Urea Nitrogen Latest Ref Range: 7 - 30 mg/dL 14   Creatinine Latest Ref Range: 0.66 - 1.25 mg/dL 0.84   GFR Estimate Latest Ref Range: >60 mL/min/1.7m2 >90   GFR Estimate If Black Latest Ref Range: >60 mL/min/1.7m2 >90   Calcium Latest Ref Range: 8.5 - 10.1 mg/dL 9.1   Anion Gap Latest Ref Range: 3 - 14 mmol/L 4   Glucose Latest Ref Range: 70 - 99 mg/dL 110 (H)   WBC Latest Ref Range: 4.0 - 11.0 10e9/L 7.0   Hemoglobin Latest Ref Range: 13.3 - 17.7 g/dL 13.6   Hematocrit Latest Ref  Range: 40.0 - 53.0 % 40.9   Platelet Count Latest Ref Range: 150 - 450 10e9/L 175   RBC Count Latest Ref Range: 4.4 - 5.9 10e12/L 4.40   MCV Latest Ref Range: 78 - 100 fl 93   MCH Latest Ref Range: 26.5 - 33.0 pg 30.9   MCHC Latest Ref Range: 31.5 - 36.5 g/dL 33.3   RDW Latest Ref Range: 10.0 - 15.0 % 14.0   INR Latest Ref Range: 0.86 - 1.14  0.96   PTT Latest Ref Range: 22 - 37 sec 29   ABO Unknown A   RH(D) Unknown Pos   Antibody Screen Unknown Neg   Test Valid Only At Latest Units:     [facility]   Specimen Expires Unknown 2018       EKG: Personally reviewed but formal cardiology read pendin2018 sinus khari      MRI 10/2017 ([facility])  1. Slight interval increase in transverse dimension of extra-axial mass involving the right prepontine and right cerebellopontine angle cisterns most consistent with a meningioma. Mild localized mass effect unchanged. Mild nonnodular dural enhancement involving the right posterior fossa anteriorly and the right internal auditory canal unchanged.  2. Small zone of dural thickening and dural enhancement left superior lateral frontal region unchanged on coronal images which may represent a small plaque-like meningioma.  3. No evidence for acute infarction or abnormal intra-axial enhancement.  4. Cerebral and cerebellar volume loss, mild chronic small vessel ischemic changes involving cerebral hemispheric white matter bilaterally and small zone of encephalomalacia and gliosis right superior frontal lobe unchanged.  5. Decreased mucosal thickening with new small air-fluid level left frontal sinus which could be consistent with mild acute sinusitis.       ASSESSMENT and PLAN  Charles Santos is a 58 year old male scheduled to undergo Suboccipital Resection Right Cerebellopontine Angle Meningioma on 2018 by Dr. Galvin in treatment of meningioma. He has the following specific operative considerations:   - RCRI : Low serious cardiac risks.  0.4%  risk of major adverse cardiac event.   - Anesthesia considerations:  Refer to PAC assessment in anesthesia records  - VTE risk: 1.8%  - TRAVIS # of risks 2/8 = low risk  - Post-op delirium risk: low risk  - Risk of PONV score = 2.  If > 2, anti-emetic intervention recommended.     Previous anesthesia without complications.  1) Cardiac: HTN, well managed with amlodipine and metoprolol. EKG 2/16/2018 sinus khari.  2) Pulmonary: PE in 2005, lifelong Coumadin that was switched to xarelto last month. He will hold this 72 hours per neurosurgery team. Current smoker and has smoked 1 PPD for 25 years. Uses albuterol about once a month and denies cough, shortness of breath or dyspnea  3) GI: GERD, well managed with nexium  4) Neuro: CPA petrous mass, most c/w a meningioma. This was found on workup for onset of double vision.  He was in ER  3/24/2018 after an episode of the head droop with facial twitching, seen by neurologist and no immediate testing done, but EEG recommended outpatient. He was started on Keppra, but states that he has not been taking it.    5) Immune: HIV +     METS well over 4  Feasible airway, upper and lower dentures         I spent 30 minutes with patient, greater than 50% educating on preop meds, counseling on anesthesia and coordinating care for neurosurgery   Pt optimized for surgery. AVS with information on surgery time/arrival time, meds and NPO status given by nursing staff      Patient was discussed with Dr Villareal.    TERESA Ferrer CNS  Preoperative Assessment Center  [facility]  Clinic and Surgery Center  Phone: [phone]  Fax: [phone]

## 2018-04-02 NOTE — TELEPHONE ENCOUNTER
Dr. Leonardo asked this writer to contact the patient to discuss the importance of having the EEG on his scheduled date and time 4/11 at 10:00 here at the [facility].    Patient is scheduled to undergo brain surgery with Dr Galvin in April and he came to the Emergency room with a possible seizure and Dr. Leonardo thinks it would be helpful to have the EEG prior to his brain surgery as his tumor is in a location that typically does not cause seizures, ie right cerebellopontine angle meningioma scheduled for craniotomy on 4/12/18.     He will be seeing DR Pisano in neurology clinic on the 5th of April if he wishes to discuss this test with him at that time he can. Presently the patient was given keppra after his visit to the emergency room when he saw another resident and the keppra was started because of the possibility of seizures.     Called patient and left voice mail asking him to call back and let the call center know a good time for me to return the call today.

## 2018-04-02 NOTE — PROGRESS NOTES
AUDIOLOGY REPORT    SUBJECTIVE:  Charles Santos is a 58 year old male who was seen in Audiology at the [facility] 4/2/2018 for audiologic evaluation.  The patient has a right SPA petrous mass and surgery for removal is scheduled for 4/12/2018. The patient denies any concern for hearing, dizziness or ear related issues.    OBJECTIVE:  Fall Risk Screen:  1. Have you fallen two or more times in the past year? No  2. Have you fallen and had an injury in the past year? No      Otoscopic exam indicates ears are clear of cerumen bilaterally     Pure Tone Thresholds assessed using conventional audiometry with good  reliability from 250-8000 Hz bilaterally using circumaural headphones and reassessed using insert earphones    RIGHT:  Normal through 1000 Hz sloping to severe sensorineural hearing loss     LEFT:    Normal through 1000 Hz sloping to profound mixed hearing loss (air-bone gap present 4000 Hz)    Tympanogram:    RIGHT: Slightly restricted eardrum mobility     LEFT:   normal eardrum mobility    Reflexes (reported by stimulus ear):  RIGHT: Ipsilateral is present at normal levels  RIGHT: Contralateral is present at normal levels  LEFT:   Ipsilateral is present at normal levels  LEFT:   Contralateral is present at normal levels      Speech Reception Threshold:    RIGHT: 30 dB HL    LEFT:   30 dB HL  Word Recognition Score:     RIGHT: 100% at 65 dB HL using NU-6 recorded word list.    LEFT:   100% at 65 dB HL using NU-6 recorded word list.      PLAN:  Patient was counseled regarding hearing loss and impact on communication. It is recommended that the patient return as referred for monitoring of hearing, or sooner if changes in hearing or ear symptoms are noted.  Please call this clinic with questions regarding these results or recommendations.      Terry Louis.  Licensed Audiologist  [place] #[id number]

## 2018-04-02 NOTE — PHARMACY - PREOPERATIVE ASSESSMENT CENTER
Anticoagulation Note - Preoperative Assessment Center (PAC) Pharmacist     Patient seen and interviewed during time of PAC Clinic appointment April 2, 2018.  The purpose of this note is to document the perioperative anticoagulation plan outlined by the providers caring for Charles Santos.     Current Regimen  Anticoagulation Regimen as of April 2, 2018: Rivaroxaban 20mg po daily.  Indication:  History of PE, DVT  Prescriber:  Dr. Chapo Zafar with [facility]  Expected Duration of therapy:  Indefinite  Recent Change in oral intake/nutrition: No    Perioperative plan  Charles Santos is scheduled for surgery on 4/12/18 and the perioperative anticoagulation plan outlined by Dr. Zafar is to hold rivaroxaban for 72 hours prior to surgery, last dose to be taken on 4/8/18, hold to begin 4/9/18.  Resumption of anticoagulation will be based on surgery team assessment of bleeding risks and complications.  This plan may require re-assessment and modification by his primary team in the perioperative setting depending on patients clinical situation.        Luci Chaudhary, PharmD, MS  April 2, 2018  10:55 AM

## 2018-04-02 NOTE — MR AVS SNAPSHOT
After Visit Summary   2018    Charles Santos    MRN: [id number]           Patient Information     Date Of Birth          1960        Visit Information        Provider Department      2018 11:00 AM Rn, [facility] Preoperative Assessment Center        Care Instructions    Preparing for Your Surgery      Name:  Charles Santos   MRN:  [id number]   :  1960   Today's Date:  2018     Arriving for surgery:  Surgery date:  1218  Surgery time:  10:10 am  Arrival time:  7:20 am  Please come to:       [facility] [address] [place]  [address]    -   parking is available in front of the hospital from 5:15 am to 8:00 pm    -  Stop at the Information Desk in the lobby    -   Inform the information person that you are here for surgery. An escort to 3c will be provided. If you would not like an escort, please proceed to 3C on the 3rd floor. [phone]     What can I eat or drink?  -  You may have solid food or milk products until 8 hours prior to your surgery. No food after midnight.  -  You may have water, apple juice  ( No other kind of juice) or 7up/Sprite until 2 hours prior to your surgery. Stop clear fluids at 7:20 am.    Which medicines can I take? No Aspirin, vitamins or supplements for 7 days before surgery. Hold Xarelto as instructed . Last evening dose of Xarelto will be 18 then no more before surgery    -  It is OKAY to take these medications: Liquifilm tears, Albuterol inhaler if needed, Flonase nasal spray if needed      How do I prepare myself?  -  Take two showers: one the night before surgery; and one the morning of surgery.         Use 1/2 bottle of Scrubcare or Hibiclens to wash from neck down for each shower..  You may use your own shampoo and conditioner. No other hair products like gel.   -  Do NOT use lotion, powder, deodorant, or antiperspirant the day of your surgery.  -  Do NOT wear any  argelia.  -Do not bring your own medications to the hospital, except for eye drops.  -  Bring your ID and insurance card.    Questions or Concerns:  If you have questions or concerns, please call the  Preoperative Assessment Center, Monday-Friday 7AM-7PM:  [phone]    AFTER YOUR SURGERY  Breathing exercises   Breathing exercises help you recover faster. Take deep breaths and let the air out slowly. This will:     Help you wake up after surgery.    Help prevent complications like pneumonia.  Preventing complications will help you go home sooner.   We may give you a breathing device (incentive spirometer) to encourage you to breathe deeply.   Nausea and vomiting   You may feel sick to your stomach after surgery; if so, let your nurse know.    Pain control:  After surgery, you may have pain. Our goal is to help you manage your pain. Pain medicine will help you feel comfortable enough to do activities that will help you heal.  These activities may include breathing exercises, walking and physical therapy.   To help your health care team treat your pain we will ask: 1) If you have pain  2) where it is located 3) describe your pain in your words  Methods of pain control include medications given by mouth, vein or by nerve block for some surgeries.  We may give you a pain control pump that will:  1) Deliver the medicine through a tube placed in your vein  2) Control the amount of medicine you receive  3) Allow you to push a button to deliver a dose of pain medicine  Sequential Compression Device (SCD) or Pneumo Boots:  You may need to wear SCD S on your legs or feet. These are wraps connected to a machine that pumps in air and releases it. The repeated pumping helps prevent blood clots from forming.                     Follow-ups after your visit        Your next 10 appointments already scheduled     Apr 02, 2018 11:00 AM CDT   (Arrive by 10:45 AM)   PAC RN ASSESSMENT with Angelita Pac Rn   [facility] ([facility])    [address] [place] [address]  [facility] [address]   [phone]            Apr 02, 2018 11:20 AM CDT   (Arrive by 11:05 AM)   PAC Anesthesia Consult with  Pac Anesthesiologist   [facility] Preoperative Assessment [place] ([facility])    [address] [place] [address]   [phone]            Apr 02, 2018 11:30 AM CDT   (Arrive by 11:15 AM)   PAC Pharmacist with  Pac Pharmacist   [facility] Preoperative Assessment [place] ([facility])    [address] [place] [address]   [phone]            Apr 02, 2018 12:00 PM CDT   LAB with  LAB   [facility] Lab ([facility])    [address] [place] [address]   [phone]           Please do not eat 10-12 hours before your appointment if you are coming in fasting for labs on lipids, cholesterol, or glucose (sugar). This does not apply to pregnant women. Water, hot tea and black coffee (with nothing added) are okay. Do not drink other fluids, diet soda or chew gum.            Apr 05, 2018  2:25 PM CDT   (Arrive by 2:10 PM)   New Patient Visit with Johnathon Pisano MD   [facility] Neurology ([facility])    [address] [place] [address]   [phone]            Apr 11, 2018 10:00 AM CDT   (Arrive by 9:45 AM)   UM Routine Visit with  EEG TECH 2   [facility] OUTPATIENT ([facility])    [address] [place] [address]   [phone]            Apr 12, 2018  9:20 AM CDT   (Arrive by 9:05 AM)   CT HEAD W CONTRAST with UUCT4   [facility], [place] ([facility] [place])    [address] [facility] [id number] [phone]           Please bring any scans or X-rays taken at other hospitals, if similar tests were done. Also  bring a list of your medicines, including vitamins, minerals and over-the-counter drugs. It is safest to leave personal items at home.  Be sure to tell your doctor:   If you have any allergies.   If there s any chance you are pregnant.   If you are breastfeeding.    If you have diabetes as your medication may need to be adjusted for this exam.  You will have contrast for this exam. To prepare:   Do not eat or drink for 2 hours before your exam. If you need to take medicine, you may take it with small sips of water. (We may ask you to take liquid medicine as well.)   The day before your exam, drink extra fluids at least six 8-ounce glasses (unless your doctor tells you to restrict your fluids).  Patients over 70 or patients with diabetes or kidney problems:   If you haven t had a blood test (creatinine test) within the last 30 days, the Cardiologist/Radiologist may require you to get this test prior to your exam.  Please wear loose clothing, such as a sweat suit or jogging clothes. Avoid snaps, zippers and other metal. We may ask you to undress and put on a hospital gown.  If you have any questions, please call the Imaging Department where you will have your exam.            Apr 12, 2018   Procedure with Azeem Galvin MD   [facility], [place], Same Day Surgery (--)    [address] [facility] [address] [phone]              Future tests that were ordered for you today     Open Future Orders        Priority Expected Expires Ordered    ABO/Rh type and screen Routine 4/2/2018 5/2/2018 4/2/2018    Basic metabolic panel Routine 4/2/2018 5/2/2018 4/2/2018    CBC with platelets Routine 4/2/2018 5/2/2018 4/2/2018    INR Routine 4/2/2018 5/2/2018 4/2/2018    Partial thromboplastin time Routine 4/2/2018 5/2/2018 4/2/2018            Who to contact     Please call your clinic at [phone] to:    Ask questions about your health    Make or cancel appointments    Discuss your medicines    Learn about your test  results    Speak to your doctor            Additional Information About Your Visit        [employer] Information     [employer] is an electronic gateway that provides easy, online access to your medical records. With [employer], you can request a clinic appointment, read your test results, renew a prescription or communicate with your care team.     To sign up for [employer] visit the website at [email or web address]   You will be asked to enter the access code listed below, as well as some personal information. Please follow the directions to create your username and password.     Your access code is: [email or web address]  Expires: 2018  7:30 AM     Your access code will  in 90 days. If you need help or a new code, please contact your [facility] or call [phone] for assistance.        Care EveryWhere ID     This is your Care EveryWhere ID. This could be used by other organizations to access your [facility] medical records  [id number]         Blood Pressure from Last 3 Encounters:   18 113/65   18 143/81   18 118/59    Weight from Last 3 Encounters:   18 67.8 kg (149 lb 8 oz)   18 69.9 kg (154 lb 1.6 oz)   18 67 kg (147 lb 9.6 oz)              Today, you had the following     No orders found for display         Today's Medication Changes          These changes are accurate as of 18 10:53 AM.  If you have any questions, ask your nurse or doctor.               These medicines have changed or have updated prescriptions.        Dose/Directions    acyclovir 400 MG tablet   Commonly known as:  ZOVIRAX   This may have changed:  Another medication with the same name was removed. Continue taking this medication, and follow the directions you see here.   Changed by:  7,  Pac Jordan        Dose:  800 mg   Take 800 mg by mouth every evening   Refills:  0       amLODIPine 10 MG tablet   Commonly known as:  NORVASC   This may have changed:  Another medication  with the same name was removed. Continue taking this medication, and follow the directions you see here.   Changed by:  7, Uc Pac Jordan        Dose:  10 mg   Take 10 mg by mouth every evening   Refills:  0       ATRIPLA 600-200-300 MG per tablet   This may have changed:  Another medication with the same name was removed. Continue taking this medication, and follow the directions you see here.   Generic drug:  efavirenz-emtrictabine-tenofovir   Changed by:  7, Uc Pac Jordan        Dose:  1 tablet   Take 1 tablet by mouth every evening   Refills:  0       esomeprazole 40 MG CR capsule   Commonly known as:  nexIUM   This may have changed:  Another medication with the same name was removed. Continue taking this medication, and follow the directions you see here.   Changed by:  7, Uc Pac Jordan        Dose:  40 mg   Take 40 mg by mouth every evening   Refills:  0       metoprolol tartrate 100 MG tablet   Commonly known as:  LOPRESSOR   This may have changed:  Another medication with the same name was removed. Continue taking this medication, and follow the directions you see here.   Changed by:  7, Uc Pac Jordan        Dose:  100 mg   Take 100 mg by mouth every evening   Refills:  0                Primary Care Provider Office Phone # Fax #    Chapo Saleem Zafar [phone]       [facility] [address] [place] [address]        Equal Access to Services     KAITLYNN AMES AH: Hadii ji zavaletao Sodyan, waaxda luqadaha, qaybta kaalmada adeegyada, vineet hatfield. So [facility] [phone].    ATENCIÓN: Si habla español, tiene a jaime disposición servicios gratuitos de asistencia lingüística. Llame al [phone].    We comply with applicable federal civil rights laws and [place] laws. We do not discriminate on the basis of race, color, national origin, age, disability, sex, sexual orientation, or gender identity.            Thank you!     Thank you for choosing [facility] PREOPERATIVE ASSESSMENT [facility]ENTER  for your care. Our goal is always to provide you with excellent care. Hearing back from our patients is one way we can continue to improve our services. Please take a few minutes to complete the written survey that you may receive in the mail after your visit with us. Thank you!             Your Updated Medication List - Protect others around you: Learn how to safely use, store and throw away your medicines at www.disposemymeds.org.          This list is accurate as of 4/2/18 10:53 AM.  Always use your most recent med list.                   Brand Name Dispense Instructions for use Diagnosis    acyclovir 400 MG tablet    ZOVIRAX     Take 800 mg by mouth every evening        albuterol 108 (90 BASE) MCG/ACT Inhaler    PROAIR HFA/PROVENTIL HFA/VENTOLIN HFA     Inhale 2 puffs into the lungs every 6 hours as needed        amLODIPine 10 MG tablet    NORVASC     Take 10 mg by mouth every evening        ascorbic acid 1000 MG Tabs    vitamin C     Take 2,000 mg by mouth every evening Oh hold since 03/10/2018        ATRIPLA 600-200-300 MG per tablet   Generic drug:  efavirenz-emtrictabine-tenofovir      Take 1 tablet by mouth every evening        DOCOSAHEXAENOIC ACID PO      Take 1 capsule by mouth every evening Oh hold since 03/10/2018        esomeprazole 40 MG CR capsule    nexIUM     Take 40 mg by mouth every evening        fluticasone 50 MCG/ACT spray    FLONASE     Spray 2 sprays into both nostrils as needed        MAGNESIUM PO      Take 250 mg by mouth every evening Oh hold since 03/10/2018        metoprolol tartrate 100 MG tablet    LOPRESSOR     Take 100 mg by mouth every evening        polyvinyl alcohol 1.4 % ophthalmic solution    LIQUIFILM TEARS     every morning        potassium chloride SA 20 MEQ CR tablet    K-DUR/KLOR-CON M     Take 40 mEq by mouth every evening Oh hold since 03/10/2018        tretinoin 0.05 % cream    RETIN-A     Apply topically once a week        VITAMIN  D (CHOLECALCIFEROL) PO      Take 1,000 mg by mouth every evening Oh hold since 03/10/2018        XARELTO PO      Take 20 mg by mouth every evening

## 2018-04-02 NOTE — TELEPHONE ENCOUNTER
[facility] Call Center    Phone Message    May a detailed message be left on voicemail: N/A    Reason for Call: Other: Returning Mera's call. I was unable to verify the pt. He was very upset and hung up.      Action Taken: Message routed to:  [facility] ([facility]): Neurology

## 2018-04-06 NOTE — TELEPHONE ENCOUNTER
Spoke with patient over phone, explained can proceed with Procedure with Dr Galvin on 4/12/18.  PAC completed on 4/2/18.    Discussed need to follow medical advice with EEG testing, but can continue with procedure  Per Dr Galvin.    Pt voices understanding.

## 2018-04-12 ENCOUNTER — HOSPITAL ENCOUNTER (INPATIENT)
Facility: CLINIC | Age: 58
LOS: 5 days | Discharge: HOME HEALTH | DRG: 025 | End: 2018-04-17
Attending: NEUROLOGICAL SURGERY | Admitting: NEUROLOGICAL SURGERY
Payer: MEDICARE

## 2018-04-12 ENCOUNTER — APPOINTMENT (OUTPATIENT)
Dept: CT IMAGING | Facility: CLINIC | Age: 58
DRG: 025 | End: 2018-04-12
Attending: NEUROLOGICAL SURGERY
Payer: MEDICARE

## 2018-04-12 ENCOUNTER — ANESTHESIA (OUTPATIENT)
Dept: SURGERY | Facility: CLINIC | Age: 58
DRG: 025 | End: 2018-04-12
Payer: MEDICARE

## 2018-04-12 DIAGNOSIS — Z98.890 S/P CRANIOTOMY: Primary | ICD-10-CM

## 2018-04-12 PROBLEM — G93.89 BRAIN MASS: Status: ACTIVE | Noted: 2018-04-12

## 2018-04-12 LAB
ABO + RH BLD: NORMAL
ABO + RH BLD: NORMAL
BASE DEFICIT BLDA-SCNC: 3.7 MMOL/L
BLD GP AB SCN SERPL QL: NORMAL
BLOOD BANK CMNT PATIENT-IMP: NORMAL
BLOOD BANK CMNT PATIENT-IMP: NORMAL
CA-I BLD-MCNC: 4.9 MG/DL (ref 4.4–5.2)
GLUCOSE BLD-MCNC: 107 MG/DL (ref 70–99)
GLUCOSE BLDC GLUCOMTR-MCNC: 155 MG/DL (ref 70–99)
GLUCOSE BLDC GLUCOMTR-MCNC: 193 MG/DL (ref 70–99)
HCO3 BLD-SCNC: 23 MMOL/L (ref 21–28)
HGB BLD-MCNC: 12.4 G/DL (ref 13.3–17.7)
O2/TOTAL GAS SETTING VFR VENT: 38 %
PCO2 BLD: 45 MM HG (ref 35–45)
PH BLD: 7.31 PH (ref 7.35–7.45)
PO2 BLD: 76 MM HG (ref 80–105)
POTASSIUM BLD-SCNC: 4.2 MMOL/L (ref 3.4–5.3)
SODIUM BLD-SCNC: 137 MMOL/L (ref 133–144)
SPECIMEN EXP DATE BLD: NORMAL

## 2018-04-12 PROCEDURE — 40000170 ZZH STATISTIC PRE-PROCEDURE ASSESSMENT II: Performed by: NEUROLOGICAL SURGERY

## 2018-04-12 PROCEDURE — 84295 ASSAY OF SERUM SODIUM: CPT | Performed by: ANESTHESIOLOGY

## 2018-04-12 PROCEDURE — 25000128 H RX IP 250 OP 636: Performed by: NURSE ANESTHETIST, CERTIFIED REGISTERED

## 2018-04-12 PROCEDURE — 25000125 ZZHC RX 250: Performed by: NEUROLOGICAL SURGERY

## 2018-04-12 PROCEDURE — 25000128 H RX IP 250 OP 636: Performed by: STUDENT IN AN ORGANIZED HEALTH CARE EDUCATION/TRAINING PROGRAM

## 2018-04-12 PROCEDURE — 84132 ASSAY OF SERUM POTASSIUM: CPT | Performed by: ANESTHESIOLOGY

## 2018-04-12 PROCEDURE — 00000146 ZZHCL STATISTIC GLUCOSE BY METER IP

## 2018-04-12 PROCEDURE — 27210995 ZZH RX 272: Performed by: NEUROLOGICAL SURGERY

## 2018-04-12 PROCEDURE — 25000128 H RX IP 250 OP 636: Performed by: NEUROLOGICAL SURGERY

## 2018-04-12 PROCEDURE — 4A10X4G MONITORING OF CENTRAL NERVOUS ELECTRICAL ACTIVITY, INTRAOPERATIVE, EXTERNAL APPROACH: ICD-10-PCS | Performed by: NEUROLOGICAL SURGERY

## 2018-04-12 PROCEDURE — 95940 IONM IN OPERATNG ROOM 15 MIN: CPT | Performed by: NEUROLOGICAL SURGERY

## 2018-04-12 PROCEDURE — 00B10ZZ EXCISION OF CEREBRAL MENINGES, OPEN APPROACH: ICD-10-PCS | Performed by: NEUROLOGICAL SURGERY

## 2018-04-12 PROCEDURE — 40000275 ZZH STATISTIC RCP TIME EA 10 MIN

## 2018-04-12 PROCEDURE — 36000076 ZZH SURGERY LEVEL 6 EA 15 ADDTL MIN - UMMC: Performed by: NEUROLOGICAL SURGERY

## 2018-04-12 PROCEDURE — 25000128 H RX IP 250 OP 636: Performed by: ANESTHESIOLOGY

## 2018-04-12 PROCEDURE — 88307 TISSUE EXAM BY PATHOLOGIST: CPT | Performed by: NEUROLOGICAL SURGERY

## 2018-04-12 PROCEDURE — 27210794 ZZH OR GENERAL SUPPLY STERILE: Performed by: NEUROLOGICAL SURGERY

## 2018-04-12 PROCEDURE — 36000074 ZZH SURGERY LEVEL 6 1ST 30 MIN - UMMC: Performed by: NEUROLOGICAL SURGERY

## 2018-04-12 PROCEDURE — 71000017 ZZH RECOVERY PHASE 1 LEVEL 3 EA ADDTL HR: Performed by: NEUROLOGICAL SURGERY

## 2018-04-12 PROCEDURE — 25000125 ZZHC RX 250: Performed by: ANESTHESIOLOGY

## 2018-04-12 PROCEDURE — 82947 ASSAY GLUCOSE BLOOD QUANT: CPT | Performed by: ANESTHESIOLOGY

## 2018-04-12 PROCEDURE — 0JU007Z SUPPLEMENT OF SCALP SUBCUTANEOUS TISSUE AND FASCIA WITH AUTOLOGOUS TISSUE SUBSTITUTE, OPEN APPROACH: ICD-10-PCS | Performed by: NEUROLOGICAL SURGERY

## 2018-04-12 PROCEDURE — C9248 INJ, CLEVIDIPINE BUTYRATE: HCPCS | Performed by: NURSE ANESTHETIST, CERTIFIED REGISTERED

## 2018-04-12 PROCEDURE — 82330 ASSAY OF CALCIUM: CPT | Performed by: ANESTHESIOLOGY

## 2018-04-12 PROCEDURE — C1781 MESH (IMPLANTABLE): HCPCS | Performed by: NEUROLOGICAL SURGERY

## 2018-04-12 PROCEDURE — 37000008 ZZH ANESTHESIA TECHNICAL FEE, 1ST 30 MIN: Performed by: NEUROLOGICAL SURGERY

## 2018-04-12 PROCEDURE — 25000566 ZZH SEVOFLURANE, EA 15 MIN: Performed by: NEUROLOGICAL SURGERY

## 2018-04-12 PROCEDURE — 70460 CT HEAD/BRAIN W/DYE: CPT

## 2018-04-12 PROCEDURE — 0JB80ZZ EXCISION OF ABDOMEN SUBCUTANEOUS TISSUE AND FASCIA, OPEN APPROACH: ICD-10-PCS | Performed by: NEUROLOGICAL SURGERY

## 2018-04-12 PROCEDURE — 71000016 ZZH RECOVERY PHASE 1 LEVEL 3 FIRST HR: Performed by: NEUROLOGICAL SURGERY

## 2018-04-12 PROCEDURE — 25000131 ZZH RX MED GY IP 250 OP 636 PS 637: Mod: GY | Performed by: NURSE ANESTHETIST, CERTIFIED REGISTERED

## 2018-04-12 PROCEDURE — 82803 BLOOD GASES ANY COMBINATION: CPT | Performed by: ANESTHESIOLOGY

## 2018-04-12 PROCEDURE — 20000004 ZZH R&B ICU UMMC

## 2018-04-12 PROCEDURE — C9399 UNCLASSIFIED DRUGS OR BIOLOG: HCPCS | Performed by: NURSE ANESTHETIST, CERTIFIED REGISTERED

## 2018-04-12 PROCEDURE — 27211022 ZZHC OR IOM SUPPLIES OPNP: Performed by: NEUROLOGICAL SURGERY

## 2018-04-12 PROCEDURE — 40000014 ZZH STATISTIC ARTERIAL MONITORING DAILY

## 2018-04-12 PROCEDURE — 37000009 ZZH ANESTHESIA TECHNICAL FEE, EACH ADDTL 15 MIN: Performed by: NEUROLOGICAL SURGERY

## 2018-04-12 PROCEDURE — C1713 ANCHOR/SCREW BN/BN,TIS/BN: HCPCS | Performed by: NEUROLOGICAL SURGERY

## 2018-04-12 PROCEDURE — 8E09XBG COMPUTER ASSISTED PROCEDURE OF HEAD AND NECK REGION, WITH COMPUTERIZED TOMOGRAPHY: ICD-10-PCS | Performed by: NEUROLOGICAL SURGERY

## 2018-04-12 DEVICE — IMP SCR SYN MATRIX LOW PRO 1.5X04MM SELF DRILL 04.503.104.01: Type: IMPLANTABLE DEVICE | Site: SKULL | Status: FUNCTIONAL

## 2018-04-12 DEVICE — IMP MESH SYN MALLEABLE 38X45MM LOW PROFILER TI: Type: IMPLANTABLE DEVICE | Site: SKULL | Status: FUNCTIONAL

## 2018-04-12 RX ORDER — POTASSIUM CHLORIDE 29.8 MG/ML
20 INJECTION INTRAVENOUS
Status: DISCONTINUED | OUTPATIENT
Start: 2018-04-12 | End: 2018-04-17 | Stop reason: HOSPADM

## 2018-04-12 RX ORDER — ONDANSETRON 2 MG/ML
4-8 INJECTION INTRAMUSCULAR; INTRAVENOUS EVERY 6 HOURS PRN
Status: DISCONTINUED | OUTPATIENT
Start: 2018-04-12 | End: 2018-04-17 | Stop reason: HOSPADM

## 2018-04-12 RX ORDER — PROPOFOL 10 MG/ML
INJECTION, EMULSION INTRAVENOUS PRN
Status: DISCONTINUED | OUTPATIENT
Start: 2018-04-12 | End: 2018-04-13

## 2018-04-12 RX ORDER — PROMETHAZINE HYDROCHLORIDE 25 MG/ML
25 INJECTION, SOLUTION INTRAMUSCULAR; INTRAVENOUS ONCE
Status: COMPLETED | OUTPATIENT
Start: 2018-04-12 | End: 2018-04-12

## 2018-04-12 RX ORDER — NALOXONE HYDROCHLORIDE 0.4 MG/ML
.1-.4 INJECTION, SOLUTION INTRAMUSCULAR; INTRAVENOUS; SUBCUTANEOUS
Status: ACTIVE | OUTPATIENT
Start: 2018-04-12 | End: 2018-04-13

## 2018-04-12 RX ORDER — MULTIVIT,TX WITH IRON,MINERALS
250 TABLET, EXTENDED RELEASE ORAL EVERY EVENING
Status: DISCONTINUED | OUTPATIENT
Start: 2018-04-12 | End: 2018-04-17 | Stop reason: HOSPADM

## 2018-04-12 RX ORDER — AMLODIPINE BESYLATE 10 MG/1
10 TABLET ORAL EVERY EVENING
Status: DISCONTINUED | OUTPATIENT
Start: 2018-04-12 | End: 2018-04-17 | Stop reason: HOSPADM

## 2018-04-12 RX ORDER — PANTOPRAZOLE SODIUM 40 MG/1
40 TABLET, DELAYED RELEASE ORAL EVERY EVENING
Status: DISCONTINUED | OUTPATIENT
Start: 2018-04-12 | End: 2018-04-17 | Stop reason: HOSPADM

## 2018-04-12 RX ORDER — MAGNESIUM SULFATE HEPTAHYDRATE 40 MG/ML
4 INJECTION, SOLUTION INTRAVENOUS EVERY 4 HOURS PRN
Status: DISCONTINUED | OUTPATIENT
Start: 2018-04-12 | End: 2018-04-17 | Stop reason: HOSPADM

## 2018-04-12 RX ORDER — ONDANSETRON 2 MG/ML
4 INJECTION INTRAMUSCULAR; INTRAVENOUS EVERY 30 MIN PRN
Status: DISCONTINUED | OUTPATIENT
Start: 2018-04-12 | End: 2018-04-12 | Stop reason: HOSPADM

## 2018-04-12 RX ORDER — IOPAMIDOL 755 MG/ML
75 INJECTION, SOLUTION INTRAVASCULAR ONCE
Status: COMPLETED | OUTPATIENT
Start: 2018-04-12 | End: 2018-04-12

## 2018-04-12 RX ORDER — CLINDAMYCIN PHOSPHATE 900 MG/50ML
900 INJECTION, SOLUTION INTRAVENOUS SEE ADMIN INSTRUCTIONS
Status: DISCONTINUED | OUTPATIENT
Start: 2018-04-12 | End: 2018-04-12 | Stop reason: HOSPADM

## 2018-04-12 RX ORDER — HYDROMORPHONE HYDROCHLORIDE 1 MG/ML
.3-.5 INJECTION, SOLUTION INTRAMUSCULAR; INTRAVENOUS; SUBCUTANEOUS
Status: DISPENSED | OUTPATIENT
Start: 2018-04-12 | End: 2018-04-13

## 2018-04-12 RX ORDER — POTASSIUM CHLORIDE 7.45 MG/ML
10 INJECTION INTRAVENOUS
Status: DISCONTINUED | OUTPATIENT
Start: 2018-04-12 | End: 2018-04-17 | Stop reason: HOSPADM

## 2018-04-12 RX ORDER — EPHEDRINE SULFATE 50 MG/ML
INJECTION, SOLUTION INTRAMUSCULAR; INTRAVENOUS; SUBCUTANEOUS PRN
Status: DISCONTINUED | OUTPATIENT
Start: 2018-04-12 | End: 2018-04-13

## 2018-04-12 RX ORDER — SODIUM CHLORIDE 9 MG/ML
INJECTION, SOLUTION INTRAVENOUS CONTINUOUS
Status: DISPENSED | OUTPATIENT
Start: 2018-04-12 | End: 2018-04-13

## 2018-04-12 RX ORDER — POTASSIUM CL/LIDO/0.9 % NACL 10MEQ/0.1L
10 INTRAVENOUS SOLUTION, PIGGYBACK (ML) INTRAVENOUS
Status: DISCONTINUED | OUTPATIENT
Start: 2018-04-12 | End: 2018-04-17 | Stop reason: HOSPADM

## 2018-04-12 RX ORDER — AMOXICILLIN 250 MG
1 CAPSULE ORAL 2 TIMES DAILY
Status: CANCELLED | OUTPATIENT
Start: 2018-04-12

## 2018-04-12 RX ORDER — ONDANSETRON 2 MG/ML
INJECTION INTRAMUSCULAR; INTRAVENOUS PRN
Status: DISCONTINUED | OUTPATIENT
Start: 2018-04-12 | End: 2018-04-13

## 2018-04-12 RX ORDER — OXYCODONE HYDROCHLORIDE 5 MG/1
5-10 TABLET ORAL
Status: DISCONTINUED | OUTPATIENT
Start: 2018-04-12 | End: 2018-04-17 | Stop reason: HOSPADM

## 2018-04-12 RX ORDER — AMOXICILLIN 250 MG
3 CAPSULE ORAL 2 TIMES DAILY
Status: DISCONTINUED | OUTPATIENT
Start: 2018-04-12 | End: 2018-04-17 | Stop reason: HOSPADM

## 2018-04-12 RX ORDER — LABETALOL HYDROCHLORIDE 5 MG/ML
INJECTION, SOLUTION INTRAVENOUS PRN
Status: DISCONTINUED | OUTPATIENT
Start: 2018-04-12 | End: 2018-04-13

## 2018-04-12 RX ORDER — DEXTROSE MONOHYDRATE 25 G/50ML
25-50 INJECTION, SOLUTION INTRAVENOUS
Status: DISCONTINUED | OUTPATIENT
Start: 2018-04-12 | End: 2018-04-17 | Stop reason: HOSPADM

## 2018-04-12 RX ORDER — METOPROLOL TARTRATE 50 MG
100 TABLET ORAL EVERY EVENING
Status: DISCONTINUED | OUTPATIENT
Start: 2018-04-12 | End: 2018-04-17 | Stop reason: HOSPADM

## 2018-04-12 RX ORDER — LIDOCAINE 40 MG/G
CREAM TOPICAL
Status: DISCONTINUED | OUTPATIENT
Start: 2018-04-12 | End: 2018-04-17 | Stop reason: HOSPADM

## 2018-04-12 RX ORDER — ONDANSETRON 4 MG/1
4-8 TABLET, ORALLY DISINTEGRATING ORAL EVERY 6 HOURS PRN
Status: DISCONTINUED | OUTPATIENT
Start: 2018-04-12 | End: 2018-04-17 | Stop reason: HOSPADM

## 2018-04-12 RX ORDER — HYDROXYZINE HYDROCHLORIDE 25 MG/1
25 TABLET, FILM COATED ORAL EVERY 6 HOURS PRN
Status: DISCONTINUED | OUTPATIENT
Start: 2018-04-12 | End: 2018-04-17 | Stop reason: HOSPADM

## 2018-04-12 RX ORDER — ACETAMINOPHEN 325 MG/1
975 TABLET ORAL EVERY 8 HOURS
Status: DISPENSED | OUTPATIENT
Start: 2018-04-12 | End: 2018-04-15

## 2018-04-12 RX ORDER — CLINDAMYCIN PHOSPHATE 900 MG/50ML
900 INJECTION, SOLUTION INTRAVENOUS
Status: DISCONTINUED | OUTPATIENT
Start: 2018-04-12 | End: 2018-04-12 | Stop reason: HOSPADM

## 2018-04-12 RX ORDER — PROCHLORPERAZINE MALEATE 10 MG
10 TABLET ORAL EVERY 6 HOURS PRN
Status: DISCONTINUED | OUTPATIENT
Start: 2018-04-12 | End: 2018-04-17 | Stop reason: HOSPADM

## 2018-04-12 RX ORDER — ALBUTEROL SULFATE 90 UG/1
2 AEROSOL, METERED RESPIRATORY (INHALATION) EVERY 6 HOURS PRN
Status: DISCONTINUED | OUTPATIENT
Start: 2018-04-12 | End: 2018-04-17 | Stop reason: HOSPADM

## 2018-04-12 RX ORDER — NALOXONE HYDROCHLORIDE 0.4 MG/ML
.1-.4 INJECTION, SOLUTION INTRAMUSCULAR; INTRAVENOUS; SUBCUTANEOUS
Status: DISCONTINUED | OUTPATIENT
Start: 2018-04-12 | End: 2018-04-12

## 2018-04-12 RX ORDER — HYDRALAZINE HYDROCHLORIDE 20 MG/ML
10-20 INJECTION INTRAMUSCULAR; INTRAVENOUS EVERY 30 MIN PRN
Status: DISCONTINUED | OUTPATIENT
Start: 2018-04-12 | End: 2018-04-17 | Stop reason: HOSPADM

## 2018-04-12 RX ORDER — FLUTICASONE PROPIONATE 50 MCG
2 SPRAY, SUSPENSION (ML) NASAL DAILY
Status: DISCONTINUED | OUTPATIENT
Start: 2018-04-13 | End: 2018-04-17 | Stop reason: HOSPADM

## 2018-04-12 RX ORDER — PANTOPRAZOLE SODIUM 40 MG/1
40 TABLET, DELAYED RELEASE ORAL EVERY MORNING
Status: DISCONTINUED | OUTPATIENT
Start: 2018-04-13 | End: 2018-04-12

## 2018-04-12 RX ORDER — NICOTINE POLACRILEX 4 MG
15-30 LOZENGE BUCCAL
Status: DISCONTINUED | OUTPATIENT
Start: 2018-04-12 | End: 2018-04-17 | Stop reason: HOSPADM

## 2018-04-12 RX ORDER — LIDOCAINE HYDROCHLORIDE 20 MG/ML
INJECTION, SOLUTION INFILTRATION; PERINEURAL PRN
Status: DISCONTINUED | OUTPATIENT
Start: 2018-04-12 | End: 2018-04-13

## 2018-04-12 RX ORDER — POTASSIUM CHLORIDE 750 MG/1
20-40 TABLET, EXTENDED RELEASE ORAL
Status: DISCONTINUED | OUTPATIENT
Start: 2018-04-12 | End: 2018-04-17 | Stop reason: HOSPADM

## 2018-04-12 RX ORDER — POLYETHYLENE GLYCOL 3350 17 G/17G
17 POWDER, FOR SOLUTION ORAL 3 TIMES DAILY
Status: DISCONTINUED | OUTPATIENT
Start: 2018-04-12 | End: 2018-04-17 | Stop reason: HOSPADM

## 2018-04-12 RX ORDER — POTASSIUM CHLORIDE 1.5 G/1.58G
20-40 POWDER, FOR SOLUTION ORAL
Status: DISCONTINUED | OUTPATIENT
Start: 2018-04-12 | End: 2018-04-17 | Stop reason: HOSPADM

## 2018-04-12 RX ORDER — LABETALOL HYDROCHLORIDE 5 MG/ML
10-40 INJECTION, SOLUTION INTRAVENOUS EVERY 10 MIN PRN
Status: DISCONTINUED | OUTPATIENT
Start: 2018-04-12 | End: 2018-04-17 | Stop reason: HOSPADM

## 2018-04-12 RX ORDER — DEXAMETHASONE SODIUM PHOSPHATE 4 MG/ML
INJECTION, SOLUTION INTRA-ARTICULAR; INTRALESIONAL; INTRAMUSCULAR; INTRAVENOUS; SOFT TISSUE PRN
Status: DISCONTINUED | OUTPATIENT
Start: 2018-04-12 | End: 2018-04-13

## 2018-04-12 RX ORDER — CALCIUM CARBONATE 500 MG/1
1000 TABLET, CHEWABLE ORAL 4 TIMES DAILY PRN
Status: DISCONTINUED | OUTPATIENT
Start: 2018-04-12 | End: 2018-04-17 | Stop reason: HOSPADM

## 2018-04-12 RX ORDER — SODIUM CHLORIDE, SODIUM LACTATE, POTASSIUM CHLORIDE, CALCIUM CHLORIDE 600; 310; 30; 20 MG/100ML; MG/100ML; MG/100ML; MG/100ML
INJECTION, SOLUTION INTRAVENOUS CONTINUOUS PRN
Status: DISCONTINUED | OUTPATIENT
Start: 2018-04-12 | End: 2018-04-13

## 2018-04-12 RX ORDER — SODIUM CHLORIDE, SODIUM LACTATE, POTASSIUM CHLORIDE, CALCIUM CHLORIDE 600; 310; 30; 20 MG/100ML; MG/100ML; MG/100ML; MG/100ML
INJECTION, SOLUTION INTRAVENOUS CONTINUOUS
Status: DISCONTINUED | OUTPATIENT
Start: 2018-04-12 | End: 2018-04-12 | Stop reason: HOSPADM

## 2018-04-12 RX ORDER — FENTANYL CITRATE 50 UG/ML
INJECTION, SOLUTION INTRAMUSCULAR; INTRAVENOUS PRN
Status: DISCONTINUED | OUTPATIENT
Start: 2018-04-12 | End: 2018-04-13

## 2018-04-12 RX ORDER — NALOXONE HYDROCHLORIDE 0.4 MG/ML
.1-.4 INJECTION, SOLUTION INTRAMUSCULAR; INTRAVENOUS; SUBCUTANEOUS
Status: DISCONTINUED | OUTPATIENT
Start: 2018-04-12 | End: 2018-04-12 | Stop reason: HOSPADM

## 2018-04-12 RX ORDER — ACYCLOVIR 800 MG/1
800 TABLET ORAL EVERY EVENING
Status: DISCONTINUED | OUTPATIENT
Start: 2018-04-12 | End: 2018-04-17 | Stop reason: HOSPADM

## 2018-04-12 RX ORDER — AMOXICILLIN 250 MG
2 CAPSULE ORAL 2 TIMES DAILY
Status: CANCELLED | OUTPATIENT
Start: 2018-04-12

## 2018-04-12 RX ORDER — ONDANSETRON 4 MG/1
4 TABLET, ORALLY DISINTEGRATING ORAL EVERY 30 MIN PRN
Status: DISCONTINUED | OUTPATIENT
Start: 2018-04-12 | End: 2018-04-12 | Stop reason: HOSPADM

## 2018-04-12 RX ORDER — BISACODYL 10 MG
10 SUPPOSITORY, RECTAL RECTAL DAILY
Status: DISCONTINUED | OUTPATIENT
Start: 2018-04-13 | End: 2018-04-17 | Stop reason: HOSPADM

## 2018-04-12 RX ORDER — FENTANYL CITRATE 50 UG/ML
25-50 INJECTION, SOLUTION INTRAMUSCULAR; INTRAVENOUS
Status: DISCONTINUED | OUTPATIENT
Start: 2018-04-12 | End: 2018-04-12 | Stop reason: HOSPADM

## 2018-04-12 RX ORDER — DEXAMETHASONE SODIUM PHOSPHATE 4 MG/ML
4 INJECTION, SOLUTION INTRA-ARTICULAR; INTRALESIONAL; INTRAMUSCULAR; INTRAVENOUS; SOFT TISSUE EVERY 6 HOURS
Status: DISCONTINUED | OUTPATIENT
Start: 2018-04-13 | End: 2018-04-13

## 2018-04-12 RX ORDER — DEXAMETHASONE 4 MG/1
4 TABLET ORAL EVERY 6 HOURS SCHEDULED
Status: DISCONTINUED | OUTPATIENT
Start: 2018-04-13 | End: 2018-04-12

## 2018-04-12 RX ORDER — ACETAMINOPHEN 325 MG/1
650 TABLET ORAL EVERY 4 HOURS PRN
Status: DISCONTINUED | OUTPATIENT
Start: 2018-04-15 | End: 2018-04-17 | Stop reason: HOSPADM

## 2018-04-12 RX ORDER — LIDOCAINE HYDROCHLORIDE AND EPINEPHRINE 10; 10 MG/ML; UG/ML
INJECTION, SOLUTION INFILTRATION; PERINEURAL PRN
Status: DISCONTINUED | OUTPATIENT
Start: 2018-04-12 | End: 2018-04-12 | Stop reason: HOSPADM

## 2018-04-12 RX ADMIN — FENTANYL CITRATE 50 MCG: 50 INJECTION, SOLUTION INTRAMUSCULAR; INTRAVENOUS at 16:53

## 2018-04-12 RX ADMIN — FENTANYL CITRATE 100 MCG: 50 INJECTION, SOLUTION INTRAMUSCULAR; INTRAVENOUS at 11:08

## 2018-04-12 RX ADMIN — PHENYLEPHRINE HYDROCHLORIDE 200 MCG: 10 INJECTION, SOLUTION INTRAMUSCULAR; INTRAVENOUS; SUBCUTANEOUS at 10:27

## 2018-04-12 RX ADMIN — CLEVIDIPINE 0.25 MG: 0.5 EMULSION INTRAVENOUS at 17:26

## 2018-04-12 RX ADMIN — PHENYLEPHRINE HYDROCHLORIDE 200 MCG: 10 INJECTION, SOLUTION INTRAMUSCULAR; INTRAVENOUS; SUBCUTANEOUS at 10:38

## 2018-04-12 RX ADMIN — FENTANYL CITRATE 50 MCG: 50 INJECTION, SOLUTION INTRAMUSCULAR; INTRAVENOUS at 14:10

## 2018-04-12 RX ADMIN — LABETALOL HYDROCHLORIDE 10 MG: 5 INJECTION INTRAVENOUS at 17:43

## 2018-04-12 RX ADMIN — CLEVIDIPINE 0.25 MG: 0.5 EMULSION INTRAVENOUS at 17:22

## 2018-04-12 RX ADMIN — SODIUM CHLORIDE: 9 INJECTION, SOLUTION INTRAVENOUS at 19:00

## 2018-04-12 RX ADMIN — PHENYLEPHRINE HYDROCHLORIDE 0.3 MCG/KG/MIN: 10 INJECTION, SOLUTION INTRAMUSCULAR; INTRAVENOUS; SUBCUTANEOUS at 11:59

## 2018-04-12 RX ADMIN — PHENYLEPHRINE HYDROCHLORIDE 200 MCG: 10 INJECTION, SOLUTION INTRAMUSCULAR; INTRAVENOUS; SUBCUTANEOUS at 10:42

## 2018-04-12 RX ADMIN — PHENYLEPHRINE HYDROCHLORIDE 100 MCG: 10 INJECTION, SOLUTION INTRAMUSCULAR; INTRAVENOUS; SUBCUTANEOUS at 10:25

## 2018-04-12 RX ADMIN — FENTANYL CITRATE 100 MCG: 50 INJECTION, SOLUTION INTRAMUSCULAR; INTRAVENOUS at 11:01

## 2018-04-12 RX ADMIN — SUGAMMADEX 150 MG: 100 INJECTION, SOLUTION INTRAVENOUS at 17:18

## 2018-04-12 RX ADMIN — CLINDAMYCIN PHOSPHATE 900 MG: 900 INJECTION, SOLUTION INTRAVENOUS at 11:41

## 2018-04-12 RX ADMIN — PHENYLEPHRINE HYDROCHLORIDE 200 MCG: 10 INJECTION, SOLUTION INTRAMUSCULAR; INTRAVENOUS; SUBCUTANEOUS at 10:34

## 2018-04-12 RX ADMIN — HYDRALAZINE HYDROCHLORIDE 10 MG: 20 INJECTION INTRAMUSCULAR; INTRAVENOUS at 20:03

## 2018-04-12 RX ADMIN — PHENYLEPHRINE HYDROCHLORIDE 100 MCG: 10 INJECTION, SOLUTION INTRAMUSCULAR; INTRAVENOUS; SUBCUTANEOUS at 11:57

## 2018-04-12 RX ADMIN — FENTANYL CITRATE 500 MCG: 50 INJECTION, SOLUTION INTRAMUSCULAR; INTRAVENOUS at 10:08

## 2018-04-12 RX ADMIN — SODIUM CHLORIDE, POTASSIUM CHLORIDE, SODIUM LACTATE AND CALCIUM CHLORIDE: 600; 310; 30; 20 INJECTION, SOLUTION INTRAVENOUS at 10:28

## 2018-04-12 RX ADMIN — PROPOFOL 40 MG: 10 INJECTION, EMULSION INTRAVENOUS at 14:09

## 2018-04-12 RX ADMIN — LABETALOL HYDROCHLORIDE 5 MG: 5 INJECTION INTRAVENOUS at 17:11

## 2018-04-12 RX ADMIN — Medication 5 MG: at 11:43

## 2018-04-12 RX ADMIN — PROPOFOL 50 MG: 10 INJECTION, EMULSION INTRAVENOUS at 10:08

## 2018-04-12 RX ADMIN — IOPAMIDOL 75 ML: 755 INJECTION, SOLUTION INTRAVENOUS at 09:21

## 2018-04-12 RX ADMIN — SODIUM CHLORIDE, POTASSIUM CHLORIDE, SODIUM LACTATE AND CALCIUM CHLORIDE: 600; 310; 30; 20 INJECTION, SOLUTION INTRAVENOUS at 13:46

## 2018-04-12 RX ADMIN — LABETALOL HYDROCHLORIDE 5 MG: 5 INJECTION INTRAVENOUS at 17:20

## 2018-04-12 RX ADMIN — PHENYLEPHRINE HYDROCHLORIDE 150 MCG: 10 INJECTION, SOLUTION INTRAMUSCULAR; INTRAVENOUS; SUBCUTANEOUS at 12:06

## 2018-04-12 RX ADMIN — ROCURONIUM BROMIDE 30 MG: 10 INJECTION INTRAVENOUS at 15:06

## 2018-04-12 RX ADMIN — Medication 5 MG: at 12:15

## 2018-04-12 RX ADMIN — PHENYLEPHRINE HYDROCHLORIDE 200 MCG: 10 INJECTION, SOLUTION INTRAMUSCULAR; INTRAVENOUS; SUBCUTANEOUS at 10:30

## 2018-04-12 RX ADMIN — PHENYLEPHRINE HYDROCHLORIDE 50 MCG: 10 INJECTION, SOLUTION INTRAMUSCULAR; INTRAVENOUS; SUBCUTANEOUS at 16:24

## 2018-04-12 RX ADMIN — Medication 5 MG: at 12:24

## 2018-04-12 RX ADMIN — PHENYLEPHRINE HYDROCHLORIDE 150 MCG: 10 INJECTION, SOLUTION INTRAMUSCULAR; INTRAVENOUS; SUBCUTANEOUS at 15:10

## 2018-04-12 RX ADMIN — LABETALOL HYDROCHLORIDE 5 MG: 5 INJECTION INTRAVENOUS at 17:15

## 2018-04-12 RX ADMIN — ROCURONIUM BROMIDE 50 MG: 10 INJECTION INTRAVENOUS at 10:17

## 2018-04-12 RX ADMIN — PHENYLEPHRINE HYDROCHLORIDE 100 MCG: 10 INJECTION, SOLUTION INTRAMUSCULAR; INTRAVENOUS; SUBCUTANEOUS at 11:55

## 2018-04-12 RX ADMIN — ROCURONIUM BROMIDE 20 MG: 10 INJECTION INTRAVENOUS at 15:48

## 2018-04-12 RX ADMIN — ROCURONIUM BROMIDE 20 MG: 10 INJECTION INTRAVENOUS at 16:09

## 2018-04-12 RX ADMIN — HYDROMORPHONE HYDROCHLORIDE 0.3 MG: 1 INJECTION, SOLUTION INTRAMUSCULAR; INTRAVENOUS; SUBCUTANEOUS at 22:52

## 2018-04-12 RX ADMIN — PROPOFOL 30 MG: 10 INJECTION, EMULSION INTRAVENOUS at 16:52

## 2018-04-12 RX ADMIN — FENTANYL CITRATE 50 MCG: 50 INJECTION, SOLUTION INTRAMUSCULAR; INTRAVENOUS at 17:32

## 2018-04-12 RX ADMIN — ONDANSETRON 4 MG: 2 INJECTION INTRAMUSCULAR; INTRAVENOUS at 16:01

## 2018-04-12 RX ADMIN — ROCURONIUM BROMIDE 15 MG: 10 INJECTION INTRAVENOUS at 16:52

## 2018-04-12 RX ADMIN — LIDOCAINE HYDROCHLORIDE 80 MG: 20 INJECTION, SOLUTION INFILTRATION; PERINEURAL at 10:08

## 2018-04-12 RX ADMIN — PROMETHAZINE HYDROCHLORIDE 25 MG: 25 INJECTION INTRAMUSCULAR; INTRAVENOUS at 22:42

## 2018-04-12 RX ADMIN — PHENYLEPHRINE HYDROCHLORIDE 100 MCG: 10 INJECTION, SOLUTION INTRAMUSCULAR; INTRAVENOUS; SUBCUTANEOUS at 15:02

## 2018-04-12 RX ADMIN — PHENYLEPHRINE HYDROCHLORIDE 200 MCG: 10 INJECTION, SOLUTION INTRAMUSCULAR; INTRAVENOUS; SUBCUTANEOUS at 10:51

## 2018-04-12 RX ADMIN — DEXAMETHASONE SODIUM PHOSPHATE 10 MG: 4 INJECTION, SOLUTION INTRA-ARTICULAR; INTRALESIONAL; INTRAMUSCULAR; INTRAVENOUS; SOFT TISSUE at 14:28

## 2018-04-12 RX ADMIN — LABETALOL HYDROCHLORIDE 5 MG: 5 INJECTION INTRAVENOUS at 17:08

## 2018-04-12 RX ADMIN — ROCURONIUM BROMIDE 15 MG: 10 INJECTION INTRAVENOUS at 16:31

## 2018-04-12 RX ADMIN — Medication 5 MG: at 10:25

## 2018-04-12 RX ADMIN — PROPOFOL 50 MG: 10 INJECTION, EMULSION INTRAVENOUS at 10:44

## 2018-04-12 RX ADMIN — LABETALOL HYDROCHLORIDE 10 MG: 5 INJECTION INTRAVENOUS at 17:39

## 2018-04-12 RX ADMIN — Medication 140 MCG/HR: at 11:12

## 2018-04-12 RX ADMIN — PHENYLEPHRINE HYDROCHLORIDE 150 MCG: 10 INJECTION, SOLUTION INTRAMUSCULAR; INTRAVENOUS; SUBCUTANEOUS at 12:09

## 2018-04-12 RX ADMIN — PHENYLEPHRINE HYDROCHLORIDE 200 MCG: 10 INJECTION, SOLUTION INTRAMUSCULAR; INTRAVENOUS; SUBCUTANEOUS at 10:45

## 2018-04-12 RX ADMIN — Medication 5 MG: at 12:51

## 2018-04-12 RX ADMIN — SODIUM CHLORIDE, POTASSIUM CHLORIDE, SODIUM LACTATE AND CALCIUM CHLORIDE: 600; 310; 30; 20 INJECTION, SOLUTION INTRAVENOUS at 09:57

## 2018-04-12 RX ADMIN — ONDANSETRON 4 MG: 2 INJECTION INTRAMUSCULAR; INTRAVENOUS at 18:05

## 2018-04-12 ASSESSMENT — VISUAL ACUITY
OU: OTHER (SEE COMMENT)

## 2018-04-12 NOTE — OR NURSING
Dr Galvin in to see patient at 0820.  He was notified his orders were still signed and pended.  When he asked about a CT scan he was notified that this had not been ordered preop. Gregorio Murphy from neuro also notified and he came at 0940 and was working with Dr Galvin on the preop orders.  CT ordered and presently is on the way to CT scan.   Dr Ruiz was in to see patient from 0830 to 0840.

## 2018-04-12 NOTE — IP AVS SNAPSHOT
[address] [address]    Phone:  [phone]                                       After Visit Summary   4/12/2018    Charles Santos    MRN: [id number]           After Visit Summary Signature Page     I have received my discharge instructions, and my questions have been answered. I have discussed any challenges I see with this plan with the nurse or doctor.    ..........................................................................................................................................  Patient/Patient Representative Signature      ..........................................................................................................................................  Patient Representative Print Name and Relationship to Patient    ..................................................               ................................................  Date                                            Time    ..........................................................................................................................................  Reviewed by Signature/Title    ...................................................              ..............................................  Date                                                            Time

## 2018-04-12 NOTE — OR NURSING
1815 -Mayra Villagomez stated would come to bedside when able with ultrasound to attempt to fix arterial line.    Anesthesia staff now to bedside to address arterial line.

## 2018-04-12 NOTE — BRIEF OP NOTE
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  Post-operative diagnosis: Right Cerebellopontine Angle Meningioma  Procedure: Procedure(s):  Suboccipital crainectomy for biopsy of Right Cerebellopontine Angle Meningioma with fat graft - Wound Class: I-Clean  Surgeon: Surgeon(s) and Role:     * Azeem Galvin MD - Primary     * Jerod Hernandez MD - Resident - Assisting  Anesthesia: General   Estimated blood loss: 100 mL  Drains:  None    Specimens:   ID Type Source Tests Collected by Time Destination   A : Right Cerebellopontine Tumor Tissue Brain SURGICAL PATHOLOGY EXAM Azeem Galvin MD 4/12/2018  2:38 PM      Findings:   Unexpected relationship of VII/VIII complex relative to tumor putting cranial nerves at risk, as such tumor resection aborted.  Complications: None.  Implants:  Synthes cranial plating system.      -----  Jerod Hernandez MD  PGY-7, Neurosurgery    Please dial * * * [phone] and enter job code [phone] to reach the neurosurgery team.

## 2018-04-12 NOTE — ANESTHESIA CARE TRANSFER NOTE
Patient: Charles Santos    Procedure(s):  Suboccipital crainectomy for biopsy of Right Cerebellopontine Angle Meningioma with fat graft - Wound Class: I-Clean    Diagnosis: Cerebellopontine Angle Meningioma  Diagnosis Additional Information: No value filed.    Anesthesia Type:   General, ETT     Note:  Airway :Face Mask  Patient transferred to:PACU  Comments: 's-150's. Labetolol given - 's. Report to RN. VSS. Ventilating well.Handoff Report: Identifed the Patient, Identified the Reponsible Provider, Reviewed the pertinent medical history, Discussed the surgical course, Reviewed Intra-OP anesthesia mangement and issues during anesthesia, Set expectations for post-procedure period and Allowed opportunity for questions and acknowledgement of understanding      Vitals: (Last set prior to Anesthesia Care Transfer)    CRNA VITALS  4/12/2018 1702 - 4/12/2018 1749      4/12/2018             NIBP: 121/74    Pulse: 70    SpO2: 100 %    EKG: NSR                Electronically Signed By: TERESA West CRNA  April 12, 2018  5:49 PM

## 2018-04-12 NOTE — ANESTHESIA PROCEDURE NOTES
Arterial Line Procedure Note  Staff:     Anesthesiologist:  KATE LOVETT    Resident/CRNA:  FOREST GAYLE    Arterial line performed by resident/CRNA in presence of a teaching physician    Location: In OR After Induction  Procedure Start/Stop Times:     patient identified, IV checked, site marked, risks and benefits discussed, informed consent, monitors and equipment checked, pre-op evaluation and at physician/surgeon's request      Correct Patient: Yes      Correct Position: Yes      Correct Site: Yes      Correct Procedure: Yes      Correct Laterality:  Yes    Site Marked:  Yes  Line Placement:     Procedure:  Arterial Line    Insertion Site:  Radial    Insertion laterality:  Left    Skin Prep: Chloraprep      Patient Prep: patient draped, mask, sterile gloves, sterile gown, hat and hand hygiene      Local skin infiltration:  None    Ultrasound Guided?: Yes      Artery evaluated via ultrasound confirming patency.   Using realtime imaging, the artery was punctured and the needle was observed entering the artery.      A permanent image is NOT entered into the patient's record.      Catheter size:  20 gauge, 12 cm    Cath secured with: suture      Dressing:  Tegaderm    Complications:  None obvious    Arterial waveform: Yes      IBP within 10% of NIBP: Yes    Assessment/Narrative:      3 attempts.  Required US for success.

## 2018-04-12 NOTE — OR NURSING
"Pt arrived to PACU.  PACU Rn assumed care of pt @ 1740.  Pt arrived minimal responsive - withdrawals from pain stimuli - nonverbal at present time.  Lungs equal and clear bi lat; tolerating face mask.  Goal for SBP per CRNA report is >/= to 140.  Pt given to dose of labetalol on arrival to pacu by CRNA.  Left radial arterial line positional.  SARAH Hernando to bedside at 1800 - stating will attempt to \"fix jose miguel\" when able.  RN following b/p cuff pressures at present time.  Will cont to attempt neuro assessment as pt becomes more alert.   MD Kim to bedside to assess pt @ 1748 - unable to complete assessment pt too sedated.        Exceptions --  Additional assessment as per flowsheet.     "

## 2018-04-12 NOTE — IP AVS SNAPSHOT
MRN:[id number]                      After Visit Summary   4/12/2018    Charles Santos    MRN: [id number]           Thank you!     Thank you for choosing [facility] for your care. Our goal is always to provide you with excellent care. Hearing back from our patients is one way we can continue to improve our services. Please take a few minutes to complete the written survey that you may receive in the mail after you visit with us. Thank you!        Patient Information     Date Of Birth          1960        Designated Caregiver       Most Recent Value    Caregiver    Will someone help with your care after discharge? no      About your hospital stay     You were admitted on:  April 12, 2018 You last received care in the:  [address] [facility] [place]    You were discharged on:  April 17, 2018        Reason for your hospital stay       You underwent craniotomy for tumor resection                  Who to Call     For medical emergencies, please call 911.  For non-urgent questions about your medical care, please call your primary care provider or clinic, [phone]  For questions related to your surgery, please call your surgery clinic        Attending Provider     Provider Specialty    Azeem Galvin MD Neurosurgery       Primary Care Provider Office Phone # Fax #    Chapo Zafar [phone]      After Care Instructions     Activity       Your activity upon discharge:   Do not do any bending, twisting, strenuous exercise, or heavy lifting (greater than 10 pounds) for 4-6 weeks. Be careful and ask for assistance when walking or going up and down stairs. Avoid any activities that could result in trauma to the surgical wound. Do not drive within 3 months of having your last seizure or while using narcotics or other sedating medications, such as sleep aids, muscle relaxants, etc.            Diet       Follow this diet upon discharge: Orders Placed This Encounter      Calorie  Counts      Regular Diet Adult Thin Liquids (water, ice chips, juice, milk, gelatin, ice cream, etc)            Discharge Instructions       You underwent surgery to have a brain tumor removed by Azeem Galvin MD   - If you have not received the results of the pathology (diagnosis) at the time of discharge, our office will call you with these results as soon as they become available, or we will discuss them with you during your clinic visit, whichever is first.     - If you are on a steroid medication (decadron or dexamethasone) please follow the detailed instructions with the prescription to slowly decrease the amount you are taking.       - You will have follow up scheduled with the physician assistants and/or nurse practitioners in our clinic 2 weeks after your surgery.  If you live far away, you may see your primary care doctor for a wound check at 2 weeks.     - If you have not heard from our clinic about your follow up visit by 3-4 days following your discharge, please call our clinic at [phone] to schedule an appointment with the Neurosurgery teams.     After discharge, your activity restrictions are:   -We encourage short frequent walks, increasing as tolerated.  - No driving until you are seen in clinic and cleared by your neurosurgeon.  If you have had a seizure, you may not drive for at least 3 months according to [place] law.    - No strenuous activity.  - No lifting more than 10 pounds until you are seen in clinic (a gallon of milk weighs approximately 8 pounds)    Wound cares after surgery  - You are ok to shower, but do not soak your incisions. Pat them dry if they get damp.   - Avoid coloring your hair or permanent styling until cleared by your surgeon  - No baths, hot tubs or pools for 4-6 weeks after surgery.   - No strenuous activity.  - No lifting more than 10 pounds until you are seen in clinic (a gallon of milk weighs approximately 8 pounds)  - You are ok to shower, but do not soak  your incisions. Pat them dry if they get damp.   - Avoid coloring your hair or permanent styling until cleared by your surgeon  - No baths, hot tubs or pools for 4-6 weeks after surgery.       Call if you have any of the followin. Temperature greater than 101.5 F.   2. Any redness, swelling or discharge from the wound.   3. Any new weakness, numbness or altered mental status.  4. Worsening pain that is not improving with the pain medications you were prescribed.     Call [phone] or after 5:00 pm or on weekends call [phone] and ask for the neurosurgery resident on call. Thank You.            Wound care and dressings       Instructions to care for your wound at home:   You should remove your dressings and bandages on post-operative day #2. You should then keep the wound undressed and open to air. You are allowed to take showers and get the wound wet starting on post-operative day #3 but you may not scrub or soak the wound or keep it submerged under water. If you do happen to get the wound wet, be sure to pat dry it rather than scrubbing it with a towel.                  Follow-up Appointments     Adult [facility] Follow-up and recommended labs and tests       Follow up with Dr Azeem Galvin in 2 weeks for wound check/suture removal and surgical follow up        Appointments on [place] and/or [facility] (with [facility] or [facility] provider or service). Call [phone] if you haven't heard regarding these appointments within 7 days of discharge.                  Your next 10 appointments already scheduled     2018  8:15 AM CDT   (Arrive by 8:00 AM)   Return Visit with Azeem Galvin MD   [facility] ([facility])    [address]  3rd Floor  [facility] [address] [phone]              Additional Services     Home Care OT Referral for Hospital Discharge       [facility]  Phone:  [phone]    Home OT to evaluate and treat; ADLs;  safety.     Your provider has ordered home care - occupational therapy. If you have not been contacted within 2 days of your discharge please call the department phone number listed on the top of this document.            Home Care PT Referral for Hospital Discharge       [facility]  Phone:  [phone]    Home PT to evaluate and treat; home safety evaluation.     Your provider has ordered home care - physical therapy. If you have not been contacted within 2 days of your discharge please call the department phone number listed on the top of this document.            Home Care SLP Referral for Hospital Discharge       [facility]  Phone:  [phone]    Home SLP to evaluate and treat, dysphagia for swallow exercises, diet tolerance and swallow strategies.    Your provider has ordered home care - speech therapy. If you have not been contacted within 2 days of your discharge please call the department phone number listed on the top of this document.            Home care nursing referral       [facility]  Phone:  [phone]    Skilled care RN visits to assess vital signs, respiratory & cardiac status.  RN to assess hydration, nutrition and bowel status.   Assess pain and activity tolerance.   RN to assess incision for signs/symptoms of infection.  Medication management.     Your provider has ordered home care nursing services. If you have not been contacted within 2 days of your discharge please call the inpatient department phone number at [phone] .                  Pending Results     No orders found from 4/10/2018 to 4/13/2018.            Statement of Approval     Ordered          04/17/18 1256  I have reviewed and agree with all the recommendations and orders detailed in this document.  EFFECTIVE NOW     Approved and electronically signed by:  Perla Marquez APRN CNP             Admission Information     Date & Time Provider Department Dept. Phone    4/12/2018 [place]  "Azeem Abebe MD [address] [facility] [phone]      Your Vitals Were     Blood Pressure Pulse Temperature Respirations Height Weight    134/70 (BP Location: Right arm) 65 97.5  F (36.4  C) (Oral) 16 1.778 m (5' 10\") 69 kg (152 lb 1.9 oz)    Pulse Oximetry BMI (Body Mass Index)                96% 21.83 kg/m2          [facility] Information     [employer] lets you send messages to your doctor, view your test results, renew your prescriptions, schedule appointments and more. To sign up, go to [email or web address] . Click on \"Log in\" on the left side of the screen, which will take you to the Welcome page. Then click on \"Sign up Now\" on the right side of the page.     You will be asked to enter the access code listed below, as well as some personal information. Please follow the directions to create your username and password.     Your access code is: [email or web address]  Expires: 2018  7:30 AM     Your access code will  in 90 days. If you need help or a new code, please call your [facility] clinic or [phone].        Care EveryWhere ID     This is your Care EveryWhere ID. This could be used by other organizations to access your [facility] medical records  [id number]        Equal Access to Services     KAITLYNN AMES AH: Hadamita duenas Sodyan, waaxda luqadaha, qaybta kaalmada bahman, vineet hatfield. So [facility] [phone].    ATENCIÓN: Si habla español, tiene a jaime disposición servicios gratuitos de asistencia lingüística. Llame al [phone].    We comply with applicable federal civil rights laws and [place] laws. We do not discriminate on the basis of race, color, national origin, age, disability, sex, sexual orientation, or gender identity.               Review of your medicines      START taking        Dose / Directions    oxyCODONE IR 5 MG tablet   Commonly known as:  ROXICODONE   Used for:  S/P craniotomy        Dose:  5-10 mg   Take 1-2 tablets (5-10 mg) by mouth every 3 " hours as needed for other (pain control or improvement in physical function. Hold dose for analgesic side effects.)   Quantity:  30 tablet   Refills:  0       polyethylene glycol Packet   Commonly known as:  MIRALAX/GLYCOLAX   Used for:  S/P craniotomy        Dose:  17 g   Take 17 g by mouth 3 times daily   Quantity:  7 packet   Refills:  1       senna-docusate 8.6-50 MG per tablet   Commonly known as:  SENOKOT-S;PERICOLACE   Used for:  S/P craniotomy        Dose:  3 tablet   Take 3 tablets by mouth 2 times daily   Quantity:  60 tablet   Refills:  1         CONTINUE these medicines which may have CHANGED, or have new prescriptions. If we are uncertain of the size of tablets/capsules you have at home, strength may be listed as something that might have changed.        Dose / Directions    XARELTO 20 MG Tabs tablet   This may have changed:  medication strength   Generic drug:  rivaroxaban ANTICOAGULANT        Dose:  20 mg   Start taking on:  4/20/2018   Take 1 tablet (20 mg) by mouth every evening   Refills:  0         CONTINUE these medicines which have NOT CHANGED        Dose / Directions    acyclovir 400 MG tablet   Commonly known as:  ZOVIRAX        Dose:  800 mg   Take 800 mg by mouth every evening   Refills:  0       albuterol 108 (90 Base) MCG/ACT Inhaler   Commonly known as:  PROAIR HFA/PROVENTIL HFA/VENTOLIN HFA        Dose:  2 puff   Inhale 2 puffs into the lungs every 6 hours as needed   Refills:  0       amLODIPine 10 MG tablet   Commonly known as:  NORVASC        Dose:  10 mg   Take 10 mg by mouth every evening   Refills:  0       ascorbic acid 1000 MG Tabs   Commonly known as:  vitamin C        Dose:  2000 mg   Take 2,000 mg by mouth every evening Oh hold since 03/10/2018   Refills:  0       ATRIPLA 600-200-300 MG per tablet   Generic drug:  efavirenz-emtrictabine-tenofovir        Dose:  1 tablet   Take 1 tablet by mouth every evening   Refills:  0       DOCOSAHEXAENOIC ACID PO        Dose:  1 capsule    Take 1 capsule by mouth every evening Oh hold since 03/10/2018   Refills:  0       esomeprazole 40 MG CR capsule   Commonly known as:  nexIUM        Dose:  40 mg   Take 40 mg by mouth every evening   Refills:  0       fluticasone 50 MCG/ACT spray   Commonly known as:  FLONASE        Dose:  2 spray   Spray 2 sprays into both nostrils as needed   Refills:  0       MAGNESIUM PO        Dose:  250 mg   Take 250 mg by mouth every evening Oh hold since 03/10/2018   Refills:  0       metoprolol tartrate 100 MG tablet   Commonly known as:  LOPRESSOR        Dose:  100 mg   Take 100 mg by mouth every evening   Refills:  0       polyvinyl alcohol 1.4 % ophthalmic solution   Commonly known as:  LIQUIFILM TEARS        every morning   Refills:  0       potassium chloride SA 20 MEQ CR tablet   Commonly known as:  K-DUR/KLOR-CON M        Dose:  40 mEq   Take 40 mEq by mouth every evening Oh hold since 03/10/2018   Refills:  0       tretinoin 0.05 % cream   Commonly known as:  RETIN-A        Apply topically once a week   Refills:  0       VITAMIN D (CHOLECALCIFEROL) PO        Dose:  1000 mg   Take 1,000 mg by mouth every evening Oh hold since 03/10/2018   Refills:  0            Where to get your medicines      These medications were sent to [facility] [place], [place] - [address] [place] [address]     Phone:  [phone]     polyethylene glycol Packet    senna-docusate 8.6-50 MG per tablet         Some of these will need a paper prescription and others can be bought over the counter. Ask your nurse if you have questions.     Bring a paper prescription for each of these medications     oxyCODONE IR 5 MG tablet                Protect others around you: Learn how to safely use, store and throw away your medicines at www.disposemymeds.org.        Information about OPIOIDS     PRESCRIPTION OPIOIDS: WHAT YOU NEED TO KNOW    Prescription opioids can be used to help relieve moderate to  severe pain and are often prescribed following a surgery or injury, or for certain health conditions. These medications can be an important part of treatment but also come with serious risks. It is important to work with your health care provider to make sure you are getting the safest, most effective care.    WHAT ARE THE RISKS AND SIDE EFFECTS OF OPIOID USE?  Prescription opioids carry serious risks of addiction and overdose, especially with prolonged use. An opioid overdose, often marked by slowed breathing can cause sudden death. The use of prescription opioids can have a number of side effects as well, even when taken as directed:      Tolerance - meaning you might need to take more of a medication for the same pain relief    Physical dependence - meaning you have symptoms of withdrawal when a medication is stopped    Increased sensitivity to pain    Constipation    Nausea, vomiting, and dry mouth    Sleepiness and dizziness    Confusion    Depression    Low levels of testosterone that can result in lower sex drive, energy, and strength    Itching and sweating    RISKS ARE GREATER WITH:    History of drug misuse, substance use disorder, or overdose    Mental health conditions (such as depression or anxiety)    Sleep apnea    Older age (65 years or older)    Pregnancy    Avoid alcohol while taking prescription opioids.   Also, unless specifically advised by your health care provider, medications to avoid include:    Benzodiazepines (such as Xanax or Valium)    Muscle relaxants (such as Soma or Flexeril)    Hypnotics (such as Ambien or Lunesta)    Other prescription opioids    KNOW YOUR OPTIONS:  Talk to your health care provider about ways to manage your pain that do not involve prescription opioids. Some of these options may actually work better and have fewer risks and side effects:    Pain relievers such as acetaminophen, ibuprofen, and naproxen    Some medications that are also used for depression or  seizures    Physical therapy and exercise    Cognitive behavioral therapy, a psychological, goal-directed approach, in which patients learn how to modify physical, behavioral, and emotional triggers of pain and stress    IF YOU ARE PRESCRIBED OPIOIDS FOR PAIN:    Never take opioids in greater amounts or more often than prescribed    Follow up with your primary health care provider and work together to create a plan on how to manage your pain.    Talk about ways to help manage your pain that do not involve prescription opioids    Talk about all concerns and side effects    Help prevent misuse and abuse    Never sell or share prescription opioids    Never use another person's prescription opioids    Store prescription opioids in a secure place and out of reach of others (this may include visitors, children, friends, and family)    Visit www.cdc.gov/drugoverdose to learn about risks of opioid abuse and overdose    If you believe you may be struggling with addiction, tell your health care provider and ask for guidance or call [facility]'s National Helpline at 7-653-964-HELP    LEARN MORE / www.cdc.gov/drugoverdose/prescribing/guideline.html    Safely dispose of unused prescription opioids: Find your local drug take-back programs and more information about the importance of safe disposal at www.doseofreality.[place].gov             Medication List: This is a list of all your medications and when to take them. Check marks below indicate your daily home schedule. Keep this list as a reference.      Medications           Morning Afternoon Evening Bedtime As Needed    acyclovir 400 MG tablet   Commonly known as:  ZOVIRAX   Take 800 mg by mouth every evening   Last time this was given:  800 mg on 4/16/2018  9:31 PM                                albuterol 108 (90 Base) MCG/ACT Inhaler   Commonly known as:  PROAIR HFA/PROVENTIL HFA/VENTOLIN HFA   Inhale 2 puffs into the lungs every 6 hours as needed                                 amLODIPine 10 MG tablet   Commonly known as:  NORVASC   Take 10 mg by mouth every evening   Last time this was given:  10 mg on 4/16/2018  9:32 PM                                ascorbic acid 1000 MG Tabs   Commonly known as:  vitamin C   Take 2,000 mg by mouth every evening Oh hold since 03/10/2018                                ATRIPLA 600-200-300 MG per tablet   Take 1 tablet by mouth every evening   Generic drug:  efavirenz-emtrictabine-tenofovir                                DOCOSAHEXAENOIC ACID PO   Take 1 capsule by mouth every evening Oh hold since 03/10/2018                                esomeprazole 40 MG CR capsule   Commonly known as:  nexIUM   Take 40 mg by mouth every evening                                fluticasone 50 MCG/ACT spray   Commonly known as:  FLONASE   Spray 2 sprays into both nostrils as needed   Last time this was given:  2 sprays on 4/17/2018  9:29 AM                                MAGNESIUM PO   Take 250 mg by mouth every evening Oh hold since 03/10/2018                                metoprolol tartrate 100 MG tablet   Commonly known as:  LOPRESSOR   Take 100 mg by mouth every evening   Last time this was given:  100 mg on 4/16/2018  9:33 PM                                oxyCODONE IR 5 MG tablet   Commonly known as:  ROXICODONE   Take 1-2 tablets (5-10 mg) by mouth every 3 hours as needed for other (pain control or improvement in physical function. Hold dose for analgesic side effects.)   Last time this was given:  10 mg on 4/14/2018  8:59 AM                                polyethylene glycol Packet   Commonly known as:  MIRALAX/GLYCOLAX   Take 17 g by mouth 3 times daily   Last time this was given:  17 g on 4/15/2018  8:09 AM                                polyvinyl alcohol 1.4 % ophthalmic solution   Commonly known as:  LIQUIFILM TEARS   every morning                                potassium chloride SA 20 MEQ CR tablet   Commonly known as:  K-DUR/KLOR-CON M   Take 40 mEq by  mouth every evening Oh hold since 03/10/2018                                senna-docusate 8.6-50 MG per tablet   Commonly known as:  SENOKOT-S;PERICOLACE   Take 3 tablets by mouth 2 times daily   Last time this was given:  3 tablets on 4/13/2018  8:05 PM                                tretinoin 0.05 % cream   Commonly known as:  RETIN-A   Apply topically once a week                                VITAMIN D (CHOLECALCIFEROL) PO   Take 1,000 mg by mouth every evening Oh hold since 03/10/2018                                XARELTO 20 MG Tabs tablet   Take 1 tablet (20 mg) by mouth every evening   Start taking on:  4/20/2018   Generic drug:  rivaroxaban ANTICOAGULANT

## 2018-04-13 ENCOUNTER — APPOINTMENT (OUTPATIENT)
Dept: GENERAL RADIOLOGY | Facility: CLINIC | Age: 58
DRG: 025 | End: 2018-04-13
Attending: NURSE PRACTITIONER
Payer: MEDICARE

## 2018-04-13 ENCOUNTER — APPOINTMENT (OUTPATIENT)
Dept: SPEECH THERAPY | Facility: CLINIC | Age: 58
DRG: 025 | End: 2018-04-13
Attending: STUDENT IN AN ORGANIZED HEALTH CARE EDUCATION/TRAINING PROGRAM
Payer: MEDICARE

## 2018-04-13 LAB
ANION GAP SERPL CALCULATED.3IONS-SCNC: 5 MMOL/L (ref 3–14)
ANION GAP SERPL CALCULATED.3IONS-SCNC: 7 MMOL/L (ref 3–14)
BUN SERPL-MCNC: 11 MG/DL (ref 7–30)
BUN SERPL-MCNC: 12 MG/DL (ref 7–30)
CALCIUM SERPL-MCNC: 7.9 MG/DL (ref 8.5–10.1)
CALCIUM SERPL-MCNC: 8.2 MG/DL (ref 8.5–10.1)
CHLORIDE SERPL-SCNC: 108 MMOL/L (ref 94–109)
CHLORIDE SERPL-SCNC: 110 MMOL/L (ref 94–109)
CO2 SERPL-SCNC: 22 MMOL/L (ref 20–32)
CO2 SERPL-SCNC: 22 MMOL/L (ref 20–32)
CREAT SERPL-MCNC: 0.54 MG/DL (ref 0.66–1.25)
CREAT SERPL-MCNC: 0.54 MG/DL (ref 0.66–1.25)
ERYTHROCYTE [DISTWIDTH] IN BLOOD BY AUTOMATED COUNT: 14.6 % (ref 10–15)
GFR SERPL CREATININE-BSD FRML MDRD: >90 ML/MIN/1.7M2
GFR SERPL CREATININE-BSD FRML MDRD: >90 ML/MIN/1.7M2
GLUCOSE BLDC GLUCOMTR-MCNC: 151 MG/DL (ref 70–99)
GLUCOSE BLDC GLUCOMTR-MCNC: 159 MG/DL (ref 70–99)
GLUCOSE BLDC GLUCOMTR-MCNC: 170 MG/DL (ref 70–99)
GLUCOSE BLDC GLUCOMTR-MCNC: 173 MG/DL (ref 70–99)
GLUCOSE BLDC GLUCOMTR-MCNC: 206 MG/DL (ref 70–99)
GLUCOSE SERPL-MCNC: 150 MG/DL (ref 70–99)
GLUCOSE SERPL-MCNC: 152 MG/DL (ref 70–99)
HBA1C MFR BLD: 5.8 % (ref 0–6.4)
HCT VFR BLD AUTO: 38.5 % (ref 40–53)
HGB BLD-MCNC: 13 G/DL (ref 13.3–17.7)
MAGNESIUM SERPL-MCNC: 1.8 MG/DL (ref 1.6–2.3)
MCH RBC QN AUTO: 31.1 PG (ref 26.5–33)
MCHC RBC AUTO-ENTMCNC: 33.8 G/DL (ref 31.5–36.5)
MCV RBC AUTO: 92 FL (ref 78–100)
PLATELET # BLD AUTO: 152 10E9/L (ref 150–450)
POTASSIUM SERPL-SCNC: 4 MMOL/L (ref 3.4–5.3)
POTASSIUM SERPL-SCNC: 6.1 MMOL/L (ref 3.4–5.3)
RBC # BLD AUTO: 4.18 10E12/L (ref 4.4–5.9)
SODIUM SERPL-SCNC: 137 MMOL/L (ref 133–144)
SODIUM SERPL-SCNC: 137 MMOL/L (ref 133–144)
WBC # BLD AUTO: 11.1 10E9/L (ref 4–11)

## 2018-04-13 PROCEDURE — 83735 ASSAY OF MAGNESIUM: CPT | Performed by: NEUROLOGICAL SURGERY

## 2018-04-13 PROCEDURE — A9270 NON-COVERED ITEM OR SERVICE: HCPCS | Mod: GY | Performed by: STUDENT IN AN ORGANIZED HEALTH CARE EDUCATION/TRAINING PROGRAM

## 2018-04-13 PROCEDURE — 40000986 XR ABDOMEN PORT 1 VW

## 2018-04-13 PROCEDURE — 25000128 H RX IP 250 OP 636: Performed by: STUDENT IN AN ORGANIZED HEALTH CARE EDUCATION/TRAINING PROGRAM

## 2018-04-13 PROCEDURE — 25000128 H RX IP 250 OP 636: Performed by: NURSE PRACTITIONER

## 2018-04-13 PROCEDURE — 25000132 ZZH RX MED GY IP 250 OP 250 PS 637: Mod: GY | Performed by: STUDENT IN AN ORGANIZED HEALTH CARE EDUCATION/TRAINING PROGRAM

## 2018-04-13 PROCEDURE — 25000132 ZZH RX MED GY IP 250 OP 250 PS 637: Mod: GY | Performed by: NEUROLOGICAL SURGERY

## 2018-04-13 PROCEDURE — A9270 NON-COVERED ITEM OR SERVICE: HCPCS | Mod: GY | Performed by: NEUROLOGICAL SURGERY

## 2018-04-13 PROCEDURE — 25000125 ZZHC RX 250: Performed by: NURSE PRACTITIONER

## 2018-04-13 PROCEDURE — 83036 HEMOGLOBIN GLYCOSYLATED A1C: CPT | Performed by: NEUROLOGICAL SURGERY

## 2018-04-13 PROCEDURE — 92610 EVALUATE SWALLOWING FUNCTION: CPT | Mod: GN | Performed by: SPEECH-LANGUAGE PATHOLOGIST

## 2018-04-13 PROCEDURE — 25000125 ZZHC RX 250: Performed by: STUDENT IN AN ORGANIZED HEALTH CARE EDUCATION/TRAINING PROGRAM

## 2018-04-13 PROCEDURE — 00000146 ZZHCL STATISTIC GLUCOSE BY METER IP

## 2018-04-13 PROCEDURE — 44500 INTRO GASTROINTESTINAL TUBE: CPT

## 2018-04-13 PROCEDURE — 25000131 ZZH RX MED GY IP 250 OP 636 PS 637: Mod: GY | Performed by: STUDENT IN AN ORGANIZED HEALTH CARE EDUCATION/TRAINING PROGRAM

## 2018-04-13 PROCEDURE — 80048 BASIC METABOLIC PNL TOTAL CA: CPT | Performed by: NEUROLOGICAL SURGERY

## 2018-04-13 PROCEDURE — 12000001 ZZH R&B MED SURG/OB UMMC

## 2018-04-13 PROCEDURE — 85027 COMPLETE CBC AUTOMATED: CPT | Performed by: NEUROLOGICAL SURGERY

## 2018-04-13 PROCEDURE — 27210429 ZZH NUTRITION PRODUCT INTERMEDIATE LITER

## 2018-04-13 PROCEDURE — 40000225 ZZH STATISTIC SLP WARD VISIT: Performed by: SPEECH-LANGUAGE PATHOLOGIST

## 2018-04-13 PROCEDURE — 36415 COLL VENOUS BLD VENIPUNCTURE: CPT | Performed by: NEUROLOGICAL SURGERY

## 2018-04-13 RX ORDER — TENOFOVIR DISOPROXIL FUMARATE 300 MG/1
300 TABLET, FILM COATED ORAL EVERY EVENING
Status: DISCONTINUED | OUTPATIENT
Start: 2018-04-13 | End: 2018-04-17 | Stop reason: HOSPADM

## 2018-04-13 RX ORDER — DEXAMETHASONE SODIUM PHOSPHATE 4 MG/ML
4 INJECTION, SOLUTION INTRA-ARTICULAR; INTRALESIONAL; INTRAMUSCULAR; INTRAVENOUS; SOFT TISSUE EVERY 8 HOURS
Status: COMPLETED | OUTPATIENT
Start: 2018-04-14 | End: 2018-04-15

## 2018-04-13 RX ORDER — EFAVIRENZ 200 MG/1
600 CAPSULE ORAL EVERY EVENING
Status: DISCONTINUED | OUTPATIENT
Start: 2018-04-13 | End: 2018-04-17 | Stop reason: HOSPADM

## 2018-04-13 RX ORDER — HYDROMORPHONE HYDROCHLORIDE 1 MG/ML
0.2 INJECTION, SOLUTION INTRAMUSCULAR; INTRAVENOUS; SUBCUTANEOUS
Status: DISCONTINUED | OUTPATIENT
Start: 2018-04-13 | End: 2018-04-17 | Stop reason: HOSPADM

## 2018-04-13 RX ORDER — DEXAMETHASONE SODIUM PHOSPHATE 4 MG/ML
4 INJECTION, SOLUTION INTRA-ARTICULAR; INTRALESIONAL; INTRAMUSCULAR; INTRAVENOUS; SOFT TISSUE EVERY 12 HOURS
Status: COMPLETED | OUTPATIENT
Start: 2018-04-15 | End: 2018-04-16

## 2018-04-13 RX ORDER — SODIUM CHLORIDE 9 MG/ML
INJECTION, SOLUTION INTRAVENOUS CONTINUOUS
Status: DISCONTINUED | OUTPATIENT
Start: 2018-04-13 | End: 2018-04-14

## 2018-04-13 RX ORDER — DEXAMETHASONE SODIUM PHOSPHATE 4 MG/ML
4 INJECTION, SOLUTION INTRA-ARTICULAR; INTRALESIONAL; INTRAMUSCULAR; INTRAVENOUS; SOFT TISSUE ONCE
Status: COMPLETED | OUTPATIENT
Start: 2018-04-16 | End: 2018-04-16

## 2018-04-13 RX ORDER — DEXAMETHASONE SODIUM PHOSPHATE 4 MG/ML
4 INJECTION, SOLUTION INTRA-ARTICULAR; INTRALESIONAL; INTRAMUSCULAR; INTRAVENOUS; SOFT TISSUE EVERY 6 HOURS
Status: COMPLETED | OUTPATIENT
Start: 2018-04-13 | End: 2018-04-14

## 2018-04-13 RX ORDER — EMTRICITABINE 200 MG/1
200 CAPSULE ORAL EVERY EVENING
Status: DISCONTINUED | OUTPATIENT
Start: 2018-04-13 | End: 2018-04-17 | Stop reason: HOSPADM

## 2018-04-13 RX ADMIN — METOPROLOL TARTRATE 100 MG: 100 TABLET, FILM COATED ORAL at 20:07

## 2018-04-13 RX ADMIN — ONDANSETRON 4 MG: 2 INJECTION INTRAMUSCULAR; INTRAVENOUS at 08:13

## 2018-04-13 RX ADMIN — MINERAL OIL AND WHITE PETROLATUM: 150; 830 OINTMENT OPHTHALMIC at 21:17

## 2018-04-13 RX ADMIN — MINERAL OIL AND WHITE PETROLATUM: 150; 830 OINTMENT OPHTHALMIC at 16:31

## 2018-04-13 RX ADMIN — DEXAMETHASONE SODIUM PHOSPHATE 4 MG: 4 INJECTION, SOLUTION INTRAMUSCULAR; INTRAVENOUS at 00:43

## 2018-04-13 RX ADMIN — AMLODIPINE BESYLATE 10 MG: 10 TABLET ORAL at 20:05

## 2018-04-13 RX ADMIN — ONDANSETRON 4 MG: 2 INJECTION INTRAMUSCULAR; INTRAVENOUS at 19:58

## 2018-04-13 RX ADMIN — OXYCODONE HYDROCHLORIDE 5 MG: 5 TABLET ORAL at 20:07

## 2018-04-13 RX ADMIN — MINERAL OIL AND WHITE PETROLATUM: 150; 830 OINTMENT OPHTHALMIC at 11:47

## 2018-04-13 RX ADMIN — DEXAMETHASONE SODIUM PHOSPHATE 4 MG: 4 INJECTION, SOLUTION INTRAMUSCULAR; INTRAVENOUS at 22:50

## 2018-04-13 RX ADMIN — PANTOPRAZOLE SODIUM 40 MG: 40 TABLET, DELAYED RELEASE ORAL at 20:05

## 2018-04-13 RX ADMIN — LIDOCAINE HYDROCHLORIDE 5 ML: 20 SOLUTION ORAL; TOPICAL at 14:19

## 2018-04-13 RX ADMIN — SENNOSIDES AND DOCUSATE SODIUM 3 TABLET: 8.6; 5 TABLET ORAL at 20:05

## 2018-04-13 RX ADMIN — HYDROMORPHONE HYDROCHLORIDE 0.2 MG: 1 INJECTION, SOLUTION INTRAMUSCULAR; INTRAVENOUS; SUBCUTANEOUS at 11:38

## 2018-04-13 RX ADMIN — ACETAMINOPHEN 975 MG: 325 TABLET, FILM COATED ORAL at 21:16

## 2018-04-13 RX ADMIN — Medication 10 MG: at 09:56

## 2018-04-13 RX ADMIN — SODIUM CHLORIDE: 9 INJECTION, SOLUTION INTRAVENOUS at 03:26

## 2018-04-13 RX ADMIN — EMTRICITABINE 200 MG: 200 CAPSULE ORAL at 20:09

## 2018-04-13 RX ADMIN — ONDANSETRON 4 MG: 2 INJECTION INTRAMUSCULAR; INTRAVENOUS at 14:19

## 2018-04-13 RX ADMIN — TENOFOVIR DISOPROXIL FUMARATE 300 MG: 300 TABLET, COATED ORAL at 20:08

## 2018-04-13 RX ADMIN — SODIUM CHLORIDE: 9 INJECTION, SOLUTION INTRAVENOUS at 11:39

## 2018-04-13 RX ADMIN — DEXAMETHASONE SODIUM PHOSPHATE 4 MG: 4 INJECTION, SOLUTION INTRAMUSCULAR; INTRAVENOUS at 06:10

## 2018-04-13 RX ADMIN — POLYETHYLENE GLYCOL 3350 17 G: 17 POWDER, FOR SOLUTION ORAL at 20:09

## 2018-04-13 RX ADMIN — DEXAMETHASONE SODIUM PHOSPHATE 4 MG: 4 INJECTION, SOLUTION INTRAMUSCULAR; INTRAVENOUS at 17:44

## 2018-04-13 RX ADMIN — ACYCLOVIR 800 MG: 800 TABLET ORAL at 20:07

## 2018-04-13 RX ADMIN — SODIUM CHLORIDE: 9 INJECTION, SOLUTION INTRAVENOUS at 18:12

## 2018-04-13 RX ADMIN — Medication 20 MG: at 11:08

## 2018-04-13 RX ADMIN — EFAVIRENZ 600 MG: 200 CAPSULE, GELATIN COATED ORAL at 20:08

## 2018-04-13 RX ADMIN — DEXAMETHASONE SODIUM PHOSPHATE 4 MG: 4 INJECTION, SOLUTION INTRAMUSCULAR; INTRAVENOUS at 11:40

## 2018-04-13 ASSESSMENT — PAIN DESCRIPTION - DESCRIPTORS
DESCRIPTORS: ACHING;HEADACHE

## 2018-04-13 ASSESSMENT — VISUAL ACUITY
OU: NORMAL ACUITY
OU: OTHER (SEE COMMENT)
OU: NORMAL ACUITY
OU: OTHER (SEE COMMENT)
OU: NORMAL ACUITY
OU: OTHER (SEE COMMENT)
OU: NORMAL ACUITY

## 2018-04-13 NOTE — PROGRESS NOTES
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  This was discovered because of double vision for which he has been followed by Dr. Jonathan Villeda here at the [facility].   Due to location of tumor decision for surgical resection was made.     Procedures:   4/12: Suboccipital Craniotomy for Resection of Right Cerebellar-Pontine Angle Meningioma    Daily events:   4/12: OR sx subtotal  resection; c/b facial nerve palsy (HB3), lost ABR; Transferred to  Post-Operatively   4/13: continued intractable nausea; given 1 dose IV phenergan, appeared to help     Imaging:  No postoperative images intended.    Examination:  Pre-op: intact     Post-op:  AOx3, PERRL. EOMI. Facial asymmetry (R-sided HB3 palsy). Hearing appears intact bilaterally.   Tongue midline, uvula midline and palate elevated symmetrically.   Motor: 5/5 BUE and BLE. SILT. DTRs 2+ throughout.     Assessment/Plan of care: 58 year old male POD#1 from aborted meningioma resection. 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 Otherwise, recovering well.     NEURO:   - Post-Operative Pain Control  - No post-op imaging   - Neuro Examination Q 1 HR    CV:  - Goal SBP < 140 mmHg    PULM:   - Incentive Spirometry Q 1 HR     GI/FEN:  - SLP evaluation to assess for PO intake   - GI Prophylaxis Not Indicated  - Electrolyte Replacement Protocol  - Bowel Regimen w/ Senna and Miralax    RENAL/:   - D/C Zendejas POD #1  - Continue 0.9 NaCl 100 mL/hour    HEME/ID:  - Plts > 100k, hgb > 8, INR < 1.5   - DVT Prophylaxis: SCDs to BLE    ENDO:   - No Issues    DISPO:  Anticipate D/C Home 4/13 vs 4/14  Barriers: valuation by therapies,  ambulating, BM/flatus, voiding without a Zendejas, tolerating PO diet  Activity: Up in Chair TID; Ambulate w/ Assist  PT: Evaluation Pending  OT: Evaluation Pending    Contact the neurosurgery resident on call with questions.    Dial * * * [phone]: Enter job code [id number] when prompted.    Cesario Harman MD  Neurosurgery PGY2    Attending: I spoke with Mr. Santos this (April 13) morning, explaining the reasons that we were unable to remove substantial amounts of his tumor. His extraocular movements appear unchanged. His degree of facial weakness is unexpected, as we had stimulation at the end of the operation.   I explained to him that recovery from the new findings may take some time and that some patients in this situation may need short term postoperative rehabilitation.  I told him that we have alternative means for treating the tumor that we will fully discuss once his recovery is more complete.  I also explained that I will be out of town for the next week for previously scheduled work activities and that my partners will be providing his care until I return.  I have personally examined the patient, reviewed and edited the resident's note and agree with the plan of care. - Azeem Galvin MD

## 2018-04-13 NOTE — PROCEDURES
Small Bowel Feeding Tube Placement Assessment  Reason for Feeding Tube Placement: Provider request for post-pyloric feeding tube placement   Cortrak Start Time:2:20   Cortrak End Time: 2:30  Medicine Delivered During Procedure: Lidocaine   Placement Successful: Presumed gastric (pending AXR confirmation).  FT coiling upward in gastric space. Pt gagging, nauseated, requesting to stop procedure. Multiple attempts to convince pt to continue procedure with explanation of why it's important. Pt allowed RD to proceed, however, procedure was stopped after pt continued with gagging, discomfort, etc to avoid emesis and aspiration risk. Of note, pt was given anti-nausea meds prior to procedure.   Procedure Complications:none other than stated   Final Placement Donnie at exit of nare 69 cm  Face to Face time with patient: 20 minutes     Seven Correia RD, DESIRAE, [facility]  Neuro ICU  Pager: [phone]

## 2018-04-13 NOTE — OR NURSING
Md Denys Hernandez to bedside to assess pt @ 1910.  Cleared pt to transfer to ICU despite sedation level.      Pt meeting phase one completion criteria @ 1910.  Pt opens eyes with voice, RN unable to completed full eye assessment  second to pt unable to keep eyes open for more than a few seconds.  Pt does have right facial droop and swelling.  RN verified with MD Hernandez this facial droop is consistent with baseline and was present preop.   Pt is oriented times 4 - speech garbled second to facial droop and missing teeth.  PT denies pain.  Lungs cont equal and clear bi lat.  Tolerating wean to 3 liters nasal cannula.   Right head dressing remains c/d/i.  Right abdomen dressing also remains c/d/i.     Auto Held medications in EPIC EMAR were communicated to receiving unit RN.  SBAR Hand off report to 4a Rn as noted.

## 2018-04-13 NOTE — ANESTHESIA POSTPROCEDURE EVALUATION
Patient: Charles Santos    Procedure(s):  Suboccipital crainectomy for biopsy of Right Cerebellopontine Angle Meningioma with fat graft - Wound Class: I-Clean    Diagnosis:Cerebellopontine Angle Meningioma  Diagnosis Additional Information: No value filed.    Anesthesia Type:  General, ETT    Note:  Anesthesia Post Evaluation    Patient location during evaluation: PACU  Patient participation: Able to fully participate in evaluation  Level of consciousness: awake  Pain management: satisfactory to patient  Airway patency: patent  Cardiovascular status: blood pressure returned to baseline and hemodynamically stable  Respiratory status: nasal cannula  Hydration status: euvolemic  PONV: controlled     Anesthetic complications: None          Last vitals:  Vitals:    04/12/18 1840 04/12/18 1855 04/12/18 1910   BP: 137/67 141/68 142/74   Pulse:      Resp: 14     Temp:      SpO2:  99% 99%         Electronically Signed By: Samm Bran MD  April 12, 2018  7:28 PM

## 2018-04-13 NOTE — OP NOTE
Procedure Date: 04/12/2018      PREOPERATIVE HISTORY:  Mr. Santos has a right-sided meningioma on the petrous bone near the cerebellopontine angle.  It had been followed with serial MRI scans and recently showed some growth.  He saw Dr. Escalante at [facility] who recommended surgical removal.  Shortly before the operation was scheduled, Dr. Escalante became unavailable to do the operation and Mr. Santos came to see me.  We discussed both surgical treatment and the alternative of radiosurgical treatment.  Given his history of deep venous thrombosis and HIV, I discussed the radiosurgical option very seriously with him, but he had considered it quite carefully and it was clear that he wanted to proceed with surgery.  Therefore, he was brought to the operating room for a suboccipital resection of his tumor.      PREOPERATIVE DIAGNOSIS:  Petrous and cerebellopontine angle meningioma.      POSTOPERATIVE DIAGNOSIS:  Petrous and cerebellopontine angle meningioma.      PROCEDURES:     1. Right retromastoid craniectomy and subtotal resection of petrous and CP angle meningioma.  2. Abdominal fat graft  3. Intraoperative computer image guidance  4. Use of operating microscope  5. Intraoperative monitoring of facial nerve  6. Intraoperaqtive monitoring of acoustic nerve    SURGEON:  Azeem Galvin MD.      ASSISTANT:  Jerod Hernadnez MD      DESCRIPTION OF PROCEDURE: Prior to the operation, the patient underwent an image guidance CT scan.  The scan was transferred to the planning station where appropriate 3-dimensional models were created and the fiducial markers were identified.     The patient was brought to the operating room where satisfactory general endotracheal anesthesia was induced. He was placed in the left lateral decubitus (right side up) position, and all pressure points carefully padded.  The head was held in Lundberg pin headrest.  The head was registered to the Stealth system, and a satisfactory registration  obtained.  Monitoring electrodes for facial nerve function and hearing were established.  The back of the head was prepped and draped in the usual fashion.    A linear incision was made in the right retromastoid region approximately a fingerbreadth behind the mastoid notch following the hairline from the top to the bottom of the ear.  This was carried through the subcutaneous tissues and galea and muscle to expose the skull.  Self-retaining fishhook retractors were used to maintain the exposure.  A single bur hole was made on the inio-meatal line approximately 3.5 cm behind the external auditory canal. The image guidance system was used to identify the ryan hole location. It was unusually anterior as Mr. Santos has an unusually flat angle between the petrous bones. This exposed the dura and the junction of the sigmoid and lateral sinuses.  The craniectomy was enlarged with a high-speed drill until the floor of the posterior fossa was visible and exposure of dura approximately the 2.5 cm in diameter was accomplished.  Mastoid air cells were entered in the process.  The bone and air cells were waxed carefully, and hemostasis assured along the sinus and the dura with Surgicel and bipolar coagulation.    We opened the dura inferiorly with a traingular flap based inferiorly and extending to the most anterioinferior and posteroinferior aspects of the exposure. The dural flap was tacked up over a cottonoid sponge.    The operating microscope was brought into use at this time.   With the dura opened inferiorly, we elevated the cerebellum and opened the arachnoid of the cisterna magna, suctioning CSF from the wound.  Excellent cerebellar relaxation was obtained.  We extended the incision anterosuperiorly, T'd it posteriorly and tacked back, the dural flaps were thus created, providing excellent cerebellar exposure, and we had excellent cerebellar relaxation.    We now followed the junction of the tentorium and petrous ridge  down in the direction of the trigeminal nerve.  The petrosal vein was identified, coagulated and divided.  We identified the eighth nerve complex.  It was directly over the posterior pole of the tumor.  The trigeminal nerve was visualized on the superior pole of the tumor and distorted posteriorly significantly and there was significant amount of tumor visible below the eighth nerve complex.  The space between the 9-10 complex and the 7-8 complex and the trigeminal nerve was relatively small.      We began by coagulating the surface of the tumor and entering it.  Unfortunately, it was quite fibrous and would not suck away with a sucker and bipolar technique.  The space available above and below the eighth nerve at this point was not sufficient to allow us to safely use the [employer] ultrasonic aspirator.      After taking some of the superior portion of the tumor, we then moved inferiorly and took a small portion of the inferior tumor.  We used the facial nerve stimulator and identified the stimulation of the facial nerve deep to the tumor and quite separate from the eighth nerve.  This suggested that the facial nerve had been pushed inferolaterally and was on the opposite side of the tumor from our approach.      The brainstem auditory evoked response monitoring began to show significant deterioration in auditory nerve function. We paused surgery at an appropriate point, filling the wound with warm artificial CSF. There was no improvement in the monitoring.  Unfortunately, there were no measures that we could take to further protect the auditory nerve as it was directly in the middle of the exposed tumor.      Taking all of these factors into account, it gradually became clear that it was going to be virtually impossible to obtain a significant resection of the tumor without causing further injury to the eighth nerve complex and that identifying and removing the tumor safely from the facial nerve would be difficult  as well.  Given the fact that the tumor is radiosurgically treatable, we elected to terminate the operation at this point.  We were able to get stimulation of the facial nerve from the brainstem at 0.05 milliamps prior to closure.     The tumor bed was copiously irrigated with Ringer s lactate and absolute intradural hemostasis confirmed with a Valsalva maneuver. The dura was then closed with interrupted and running 4-0 Vicryl sutures.  The bone edges were waxed again, and then the dural closure reinforced with Tisseel.  The fat graft obtained at an appropriate time during the closure was used to fill the cranial defect, which was then covered with a titanium plate and secured with titanium screws.  The wound was copiously irrigated and closed in layers with 2-0 Vicryl in the subcutaneous tissues and galea and a running 3-0 nylon in the skin.    At an appropriate time during the closure, a 2-cm incision made in the right abdomen and about 20 cc of subcutaneous fat removed with a monopolar coagulator.  Absolute hemostasis was assured, the wound irrigated with antibiotic and then closed with several deep sutures of 3-0 Vicryl and a running 4-0 intradermal suture of 4-0 Vicryl reinforced with Steri-Strips.  Sterile dressing was applied.    The patient was sent to recovery room in satisfactory condition.    ESTIMATED BLOOD LOSS:  25 cc.    I, Dr. Azeem Galvin, was present for the key portions of the operation and immediately available for the entire procedure.     AZEEM GALVIN MD             D: 2018   T: 2018   MT: NTS      Name:     JESSIE OSWALD   MRN:      [id number]        Account:        [id number]   :      1960           Procedure Date: 2018      Document: [id number]

## 2018-04-13 NOTE — PHARMACY-CONSULT NOTE
Pharmacy Tube Feeding Consult    Medication reviewed for administration by feeding tube and for potential food/drug interactions.    Recommendation:     efavirenz-emtrictabine-tenofovir (ATRIPLA) 600-200-3  00 MG per tablet    Efavirenz is water insoluble;  recommends not crushing for this reason)      Pharmacy will continue to follow as new medications are ordered.    Eloisa Xiao, PharmD

## 2018-04-13 NOTE — PLAN OF CARE
Problem: Patient Care Overview  Goal: Plan of Care/Patient Progress Review  Outcome: Improving  Neuro: R facial droop and decreased R ear hearing. Otherwise intact.   Cardio: SBP parameters liberalized to <160, SR, afebrile.  Resp: RA  GI: Nausea and emesis with speech eval, NPO for now. Nutrition placed gastric feed tube today, awaiting feeds from nutrition department (called and spoke to nutrition rep)  : Aashish dc'd, pt has since voided.  Skin: R abd dsg-marked, R head dsg-marked.   Plan: Transfer to .

## 2018-04-13 NOTE — PROVIDER NOTIFICATION
Dr. Mcdermott notified of pt's right facial droop, RASS -1, vomiting in PACU. Will hold PO medications and defer swallow eval to SLP in am. Notified of bilateral left end gaze nystagmus.

## 2018-04-13 NOTE — PLAN OF CARE
Problem: Patient Care Overview  Goal: Plan of Care/Patient Progress Review  OT 4A: Cancel. Patient with nausea earlier this day and still in ICU for medical management. OT to reschedule evaluation and initiate as appropriate 4/14.

## 2018-04-13 NOTE — PROCEDURES
Bridle Placement:   Reason for bridle placement: securement of FT   Medicine delivered during procedure: lubricating jelly   Procedure: Successful   Location of top of clip on FT: @ 70 cm marker   Condition of nose/skin at time of bridle placement: Unremarkable   Face to Face time with patient: 5 minutes.    Seven Correia RD, DESIRAE, [facility]  Neuro ICU  Pager: [phone]

## 2018-04-13 NOTE — OR NURSING
Paged Neuro surgery to inform pt cont lethargic - difficult to complete neuro assessments due to sedation status.

## 2018-04-13 NOTE — PROGRESS NOTES
04/13/18 0832   General Information   Onset Date 04/12/18   Start of Care Date 04/13/18   Referring Physician Lacey Mcdermott MD    Patient Profile Review/OT: Additional Occupational Profile Info See Profile for full history and prior level of function   Patient/Family Goals Statement Goals not stated at the time of evaluation.     Swallowing Evaluation Bedside swallow evaluation   Behaviorial Observations WFL (within functional limits);Lethargic   Mode of current nutrition NPO   Respiratory Status O2 Supply   Type of O2 supply Nasal cannula   Comments Pt is a 58 year old male POD#1 from aborted meningioma resection. 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  Pt lethargic but able to maintain LINA for duration of brief session.     Clinical Swallow Evaluation   Oral Musculature other (see comments)  (R sided facial droop )   Structural Abnormalities none present   Dentition upper and lower dentures   Mucosal Quality dry   Oral Labial Strength and Mobility impaired retraction;impaired pursing;impaired seal;impaired coordination   Lingual Strength and Mobility impaired protrusion;impaired anterior elevation;impaired left lateral movement;impaired right lateral movement;impaired coordination  (Lingual deviation to L side )   Laryngeal Function Cough;Throat clear;Swallow;Voicing initiated;Dry swallow palpated   Additional Documentation Yes   Clinical Swallow Eval: Thin Liquid Texture Trial   Mode of Presentation, Thin Liquids spoon;fed by clinician   Volume of Liquid or Food Presented ice chips x3    Oral Phase of Swallow WFL   Pharyngeal Phase of Swallow regurgitation of food/liquids   Diagnostic Statement Pt tolerated ice chips x3 without overt s/s of aspiration before reporting senseation of nausea and began dry-heaving.  Further PO not served due to nausea.    Swallow Compensations   Swallow Compensations Pacing;Reduce amounts   General Therapy Interventions   Planned Therapy Interventions  Dysphagia Treatment   Swallow Eval: Clinical Impressions   Skilled Criteria for Therapy Intervention Skilled criteria met.  Treatment indicated.   Functional Assessment Scale (FAS) 5   Treatment Diagnosis Mild oral dysphagia    Diet texture recommendations NPO  (Ice chips ok )   Demonstrates Need for Referral to Another Service occupational therapy;physical therapy;dietitian   Therapy Frequency 5 times/wk   Predicted Duration of Therapy Intervention (days/wks) 1 week   Anticipated Discharge Disposition other (see comments)  (Defer to OT/PT )   Risks and Benefits of Treatment have been explained. Yes   Patient, family and/or staff in agreement with Plan of Care Yes   Clinical Impression Comments Pt seen bedside for swallow evaluation per MD orders.  Pt presents with mild oral dysphagia; however, results should be interpreted with caution as trials limited to ice chips due to nausea and dry heaving.  Further trials not served due to concern for emesis.  Recommend NPO with ice chips ok for comfort when fully alert and upright.  ST to follow to assess for PO readiness.     Total Evaluation Time   Total Evaluation Time (Minutes) 16

## 2018-04-13 NOTE — OR NURSING
Text paged neuro surgery pager [phone] @ 2000 - pt had second emesis.  Informed of first emesis at 1800 of appx 100 cc of mucus and bile then second emesis of mucous and bile of appx 250 cc.  Zofran given previously @ 1805.  Pt also medicated with Hydralazine for SBP greater than 140.  Cont to hold pt in PACU until ICU able to take pt.

## 2018-04-13 NOTE — OR NURSING
No return paged received from Neuro surgery.  Pt;s nausea and vomiting has resolved at this time.  Blood pressure now within goal after pt medicated.  Hand off report to 4A Daniel Liao @ 2020.

## 2018-04-13 NOTE — PROGRESS NOTES
CLINICAL NUTRITION SERVICES - ASSESSMENT NOTE     Nutrition Prescription    RECOMMENDATIONS FOR MDs/PROVIDERS TO ORDER:  - Total daily fluids/adjustments per MD  - consider checking TG and vitamin D status (h/o HIV)   - consider pro-motility agent as medically approrpriate if pt having EN tolerance issues related to delayed gastric emptying/N/V.   - Scheduled anti-nausea meds and prn as appro     Malnutrition Status:    - Severe malnutrition in the context of acute on chronic disease     Recommendations already ordered by Registered Dietitian (RD):  - Once FT placed and confirmed post-pyloric OR per MD ok for gastric feeds, recommend fiber-free regimen (N/V, gastric feeds; do not suspect pt will require nutritions support for long and thus may not benefit from immune-modulating regimen): Nutren 1.5 @ 55 ml/hr to provide 1320 ml, 1980 kcals (29 kcals/kg), 90 gm PRO (1.3 gm/kg), 232 gm CHO, no fiber, 1003 ml free water.   - Initiate @ 15 ml/hr and advance by 10 ml q8hr as tolerated   - Do not start or advance until lytes (Mg++,K+) WNL and phos>1.9   - Recommend 30-60 ml q4hr fluid flushes for tube patency. Additional fluids and/or adjustments per MD.    - Order multivitamin/mineral (15 ml/day via FT) to help ensure micronutrient needs being met with suspected hypermetabolic demands and potential interruptions to TF infusions.  - Aspiration precautions/elevated HOB > 30     Future/Additional Recommendations:  - FT position   - EN initation, tolerance, lytes  - TG/vitamin D      REASON FOR ASSESSMENT  Charles Santos is a/an 58 year old male assessed by the dietitian for Provider Order - Registered Dietitian to Assess and Order TF per Medical Nutrition Therapy Protocol    Medical history: Pt with h/o GERD, HIV, HTN, and meningioma.     NUTRITION HISTORY  Per pt, eating well PTA. No recent weight loss or nutrition concerns/symptoms. Currently, pt is nauseated. Pt was taking omega-3s, vitamin D, K+, and magnesium PTA but  "stopped just prior to surgery. Pt reports trying to lose weight recently by following low CHO, high fat diet. Encouraged otherwise, as pt does not need to lose weight and is already at risk for elevated TG.     CURRENT NUTRITION ORDERS  Diet: NPO  SLP assessed pt and pt with mild oral dysphagia; Further trials not served due to concern for emesis per SLP note.     LABS  Labs reviewed  Glucose labs: 151, 206, 173 (H)    MEDICATIONS  Medications reviewed  Insulin  Dulcolax; Miralax; Senokot  IVF @ 100 ml/hr     ANTHROPOMETRICS  Height: 177.8 cm (5' 10\") 70\"  Most Recent Weight: 69 kg (152 lb 1.9 oz)    IBW: 75 kg  BMI: Normal BMI  Weight History:   Wt Readings from Last 9 Encounters:   04/12/18 69 kg (152 lb 1.9 oz)   04/02/18 67.8 kg (149 lb 8 oz)   03/24/18 69.9 kg (154 lb 1.6 oz)   02/21/18 67 kg (147 lb 9.6 oz)   05/02/16 73.5 kg (162 lb)   No recent weight loss noted; 6% loss over ~ 1 week.     Dosing Weight: 69 kg (current weight)    ASSESSED NUTRITION NEEDS  Estimated Energy Needs: 0039-7745+ kcals/day (25 - 30 kcals/kg)  Justification: Increased needs  Estimated Protein Needs: + grams protein/day (1.2 - 1.5+ grams of pro/kg)  Justification: Increased needs  Estimated Fluid Needs: 7803-2729 mL/day (1 mL/kcal)   Justification: Maintenance and Per provider pending fluid status    PHYSICAL FINDINGS  See malnutrition section below.     MALNUTRITION  % Intake: No decreased intake noted per pt  % Weight Loss: > 2% in 1 week (severe)  Subcutaneous Fat Loss: Facial region:, Upper arm:, Lower arm: and Thoracic/intercostal: severe   Muscle Loss: Temporal, Facial & jaw region - moderate, Scapular bone, Thoracic region (clavicle, acromium bone, deltoid, trapezius, pectoral), Upper arm (bicep, tricep), Lower arm  (forearm), Dorsal hand, Upper leg (quadricep, hamstring), Patellar region and Posterior calf:  Severe   Fluid Accumulation/Edema: None noted  Malnutrition Diagnosis: Severe malnutrition in the context of " acute on chronic disease     NUTRITION DIAGNOSIS  Inadequate protein-energy intake related to inability to take oral PO/N/V, dysphagia as evidenced by NPO diet status and pt currently meeting 0% kcal/PRO needs.       INTERVENTIONS  Implementation  Nutrition Education: Provided education on role of RD, FT, nutrition; encouragement of adequate PO intake vs dieting    Enteral Nutrition - Initiate as ordered    Discussed with team     Goals  Total avg nutritional intake to meet a minimum of 25 kcal/kg and 1.2 g PRO/kg daily (per dosing wt 69 kg).     Monitoring/Evaluation  Progress toward goals will be monitored and evaluated per protocol.     Seven Correia RD, LD, [facility]  Neuro ICU  Pager: [phone]

## 2018-04-13 NOTE — PLAN OF CARE
Problem: Patient Care Overview  Goal: Plan of Care/Patient Progress Review  Discharge Planner SLP   Patient plan for discharge: Unknown   Current status: Pt seen bedside for swallow evaluation per MD orders.  Pt presents with mild oral dysphagia; however, results should be interpreted with caution as trials limited to ice chips due to nausea and dry heaving.  Further trials not served due to concern for emesis.  Recommend NPO with ice chips ok for comfort when fully alert and upright.  ST to follow to assess for PO readiness.    Barriers to return to prior living situation: None from ST perspective   Recommendations for discharge: Defer to OT/PT   Rationale for recommendations: Anticipate swallow function will not impact discharge disposition        Entered by: Erica Hilario 04/13/2018 8:46 AM

## 2018-04-13 NOTE — PLAN OF CARE
Problem: Patient Care Overview  Goal: Plan of Care/Patient Progress Review  Arrived to unit around 2030 from PACU after right craniotomy for meningioma resection.    Lethargic but easily arousable to voice. A/o x4 ROSENDO 2mm, nystagmus with both eyes when looking left, right eye muscle weakness (no ptosis), right facial droop. Tongue midline. Strengths equal throughout.    NSR, SBP goal < 140 maintained.      2L NC.     NPO due to right facial droop, drowsiness, and large amt of emesis x2 in PACU. Denies nausea since arriving to 4A. Hypoactive bowel sounds.     Zendejas with clear yellow urine output ~100/hr    Skin: right scalp incision with shadowing, marked on dressing. Abdominal fat graft dressing intact with small amt of shadowing marked.

## 2018-04-14 ENCOUNTER — APPOINTMENT (OUTPATIENT)
Dept: PHYSICAL THERAPY | Facility: CLINIC | Age: 58
DRG: 025 | End: 2018-04-14
Attending: NEUROLOGICAL SURGERY
Payer: MEDICARE

## 2018-04-14 ENCOUNTER — APPOINTMENT (OUTPATIENT)
Dept: SPEECH THERAPY | Facility: CLINIC | Age: 58
DRG: 025 | End: 2018-04-14
Attending: NEUROLOGICAL SURGERY
Payer: MEDICARE

## 2018-04-14 LAB
GLUCOSE BLDC GLUCOMTR-MCNC: 183 MG/DL (ref 70–99)
GLUCOSE BLDC GLUCOMTR-MCNC: 193 MG/DL (ref 70–99)
GLUCOSE BLDC GLUCOMTR-MCNC: 194 MG/DL (ref 70–99)
GLUCOSE BLDC GLUCOMTR-MCNC: 203 MG/DL (ref 70–99)
GLUCOSE BLDC GLUCOMTR-MCNC: 212 MG/DL (ref 70–99)
MAGNESIUM SERPL-MCNC: 2.3 MG/DL (ref 1.6–2.3)
PHOSPHATE SERPL-MCNC: 2 MG/DL (ref 2.5–4.5)

## 2018-04-14 PROCEDURE — 25000128 H RX IP 250 OP 636: Performed by: STUDENT IN AN ORGANIZED HEALTH CARE EDUCATION/TRAINING PROGRAM

## 2018-04-14 PROCEDURE — 25000132 ZZH RX MED GY IP 250 OP 250 PS 637: Mod: GY | Performed by: STUDENT IN AN ORGANIZED HEALTH CARE EDUCATION/TRAINING PROGRAM

## 2018-04-14 PROCEDURE — 00000146 ZZHCL STATISTIC GLUCOSE BY METER IP

## 2018-04-14 PROCEDURE — 97530 THERAPEUTIC ACTIVITIES: CPT | Mod: GP

## 2018-04-14 PROCEDURE — A9270 NON-COVERED ITEM OR SERVICE: HCPCS | Mod: GY | Performed by: NURSE PRACTITIONER

## 2018-04-14 PROCEDURE — 84100 ASSAY OF PHOSPHORUS: CPT | Performed by: NURSE PRACTITIONER

## 2018-04-14 PROCEDURE — A9270 NON-COVERED ITEM OR SERVICE: HCPCS | Mod: GY | Performed by: STUDENT IN AN ORGANIZED HEALTH CARE EDUCATION/TRAINING PROGRAM

## 2018-04-14 PROCEDURE — 40000193 ZZH STATISTIC PT WARD VISIT

## 2018-04-14 PROCEDURE — A9270 NON-COVERED ITEM OR SERVICE: HCPCS | Mod: GY | Performed by: NEUROLOGICAL SURGERY

## 2018-04-14 PROCEDURE — 40000225 ZZH STATISTIC SLP WARD VISIT: Performed by: SPEECH-LANGUAGE PATHOLOGIST

## 2018-04-14 PROCEDURE — 12000001 ZZH R&B MED SURG/OB UMMC

## 2018-04-14 PROCEDURE — 83735 ASSAY OF MAGNESIUM: CPT | Performed by: NURSE PRACTITIONER

## 2018-04-14 PROCEDURE — 25000132 ZZH RX MED GY IP 250 OP 250 PS 637: Mod: GY | Performed by: NEUROLOGICAL SURGERY

## 2018-04-14 PROCEDURE — 25000128 H RX IP 250 OP 636: Performed by: NURSE PRACTITIONER

## 2018-04-14 PROCEDURE — 97161 PT EVAL LOW COMPLEX 20 MIN: CPT | Mod: GP

## 2018-04-14 PROCEDURE — 25000125 ZZHC RX 250: Performed by: STUDENT IN AN ORGANIZED HEALTH CARE EDUCATION/TRAINING PROGRAM

## 2018-04-14 PROCEDURE — 36415 COLL VENOUS BLD VENIPUNCTURE: CPT | Performed by: NURSE PRACTITIONER

## 2018-04-14 PROCEDURE — 97116 GAIT TRAINING THERAPY: CPT | Mod: GP

## 2018-04-14 PROCEDURE — 92526 ORAL FUNCTION THERAPY: CPT | Mod: GN | Performed by: SPEECH-LANGUAGE PATHOLOGIST

## 2018-04-14 PROCEDURE — 25000132 ZZH RX MED GY IP 250 OP 250 PS 637: Mod: GY | Performed by: NURSE PRACTITIONER

## 2018-04-14 RX ADMIN — ACETAMINOPHEN 975 MG: 325 TABLET, FILM COATED ORAL at 04:59

## 2018-04-14 RX ADMIN — DEXAMETHASONE SODIUM PHOSPHATE 4 MG: 4 INJECTION, SOLUTION INTRAMUSCULAR; INTRAVENOUS at 17:16

## 2018-04-14 RX ADMIN — POLYETHYLENE GLYCOL 3350 17 G: 17 POWDER, FOR SOLUTION ORAL at 12:18

## 2018-04-14 RX ADMIN — POLYETHYLENE GLYCOL 3350 17 G: 17 POWDER, FOR SOLUTION ORAL at 19:54

## 2018-04-14 RX ADMIN — OXYCODONE HYDROCHLORIDE 5 MG: 5 TABLET ORAL at 01:26

## 2018-04-14 RX ADMIN — EMTRICITABINE 200 MG: 200 CAPSULE ORAL at 19:54

## 2018-04-14 RX ADMIN — MULTIVITAMIN 15 ML: LIQUID ORAL at 12:18

## 2018-04-14 RX ADMIN — ACYCLOVIR 800 MG: 800 TABLET ORAL at 19:53

## 2018-04-14 RX ADMIN — DEXAMETHASONE SODIUM PHOSPHATE 4 MG: 4 INJECTION, SOLUTION INTRAMUSCULAR; INTRAVENOUS at 12:03

## 2018-04-14 RX ADMIN — PANTOPRAZOLE SODIUM 40 MG: 40 TABLET, DELAYED RELEASE ORAL at 19:53

## 2018-04-14 RX ADMIN — Medication 250 MG: at 19:54

## 2018-04-14 RX ADMIN — ACETAMINOPHEN 975 MG: 325 TABLET, FILM COATED ORAL at 22:51

## 2018-04-14 RX ADMIN — MINERAL OIL AND WHITE PETROLATUM: 150; 830 OINTMENT OPHTHALMIC at 09:08

## 2018-04-14 RX ADMIN — MINERAL OIL AND WHITE PETROLATUM: 150; 830 OINTMENT OPHTHALMIC at 16:53

## 2018-04-14 RX ADMIN — AMLODIPINE BESYLATE 10 MG: 10 TABLET ORAL at 19:53

## 2018-04-14 RX ADMIN — MINERAL OIL AND WHITE PETROLATUM: 150; 830 OINTMENT OPHTHALMIC at 20:14

## 2018-04-14 RX ADMIN — POTASSIUM PHOSPHATE, MONOBASIC AND POTASSIUM PHOSPHATE, DIBASIC 15 MMOL: 224; 236 INJECTION, SOLUTION INTRAVENOUS at 12:07

## 2018-04-14 RX ADMIN — EFAVIRENZ 600 MG: 200 CAPSULE, GELATIN COATED ORAL at 19:54

## 2018-04-14 RX ADMIN — TENOFOVIR DISOPROXIL FUMARATE 300 MG: 300 TABLET, COATED ORAL at 19:53

## 2018-04-14 RX ADMIN — METOPROLOL TARTRATE 100 MG: 100 TABLET, FILM COATED ORAL at 19:53

## 2018-04-14 RX ADMIN — OXYCODONE HYDROCHLORIDE 10 MG: 5 TABLET ORAL at 08:59

## 2018-04-14 RX ADMIN — DEXAMETHASONE SODIUM PHOSPHATE 4 MG: 4 INJECTION, SOLUTION INTRAMUSCULAR; INTRAVENOUS at 04:59

## 2018-04-14 RX ADMIN — Medication 1 MG: at 20:13

## 2018-04-14 RX ADMIN — MINERAL OIL AND WHITE PETROLATUM: 150; 830 OINTMENT OPHTHALMIC at 12:03

## 2018-04-14 RX ADMIN — ONDANSETRON 4 MG: 2 INJECTION INTRAMUSCULAR; INTRAVENOUS at 08:50

## 2018-04-14 RX ADMIN — SODIUM CHLORIDE: 9 INJECTION, SOLUTION INTRAVENOUS at 04:13

## 2018-04-14 RX ADMIN — ONDANSETRON 8 MG: 4 TABLET, ORALLY DISINTEGRATING ORAL at 19:23

## 2018-04-14 RX ADMIN — ACETAMINOPHEN 975 MG: 325 TABLET, FILM COATED ORAL at 13:41

## 2018-04-14 ASSESSMENT — VISUAL ACUITY
OU: OTHER (SEE COMMENT)
OU: NORMAL ACUITY
OU: NORMAL ACUITY
OU: OTHER (SEE COMMENT)

## 2018-04-14 NOTE — PLAN OF CARE
Problem: Patient Care Overview  Goal: Plan of Care/Patient Progress Review  Discharge Planner SLP   Patient plan for discharge: Unknown   Current status: Pt is appropriate for diet advancement to dysphagia diet level 2 and nectar thick liquids when fully alert and upright.  Pt will need to take small bites/sips at a slow pace while alternating consistencies.  Pt demonstrated functional tolerance of modified items at bedside with positive s/s of aspiration on trials of thin liquids and solid textures.  Pt c/o sensation of FT in throat.    Barriers to return to prior living situation: Below baseline, safety concerns   Recommendations for discharge: Rehab   Rationale for recommendations: Anticipate ongoing needs        Entered by: Erica Hilario 04/14/2018 10:06 AM

## 2018-04-14 NOTE — PROGRESS NOTES
04/14/18 1200   Quick Adds   Type of Visit Initial PT Evaluation   Living Environment   Lives With alone   Living Arrangements apartment   Home Accessibility no concerns   Number of Stairs to Enter Home 0   Number of Stairs Within Home 0   Stair Railings at Home none   Transportation Available family or friend will provide;car   Living Environment Comment Pt lives alone in apt.  Will be staying with girlfriend when d/c'd from hospital.  Does not currently work.  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 IND with ADL's and mobility at baseline.    Self-Care   Usual Activity Tolerance good   Current Activity Tolerance moderate   Regular Exercise no   Equipment Currently Used at Home none   Functional Level Prior   Ambulation 0-->independent   Transferring 0-->independent   Toileting 0-->independent   Bathing 0-->independent   Dressing 0-->independent   Eating 0-->independent   Communication 0-->understands/communicates without difficulty   Swallowing 0-->swallows foods/liquids without difficulty   Cognition 0 - no cognition issues reported   Fall history within last six months no   Which of the above functional risks had a recent onset or change? ambulation;transferring   General Information   Onset of Illness/Injury or Date of Surgery - Date 04/12/18   Referring Physician Gregorio Cook MD   Patient/Family Goals Statement return to PLOF   Pertinent History of Current Problem (include personal factors and/or comorbidities that impact the POC) 57-year-old gentleman with well-controlled HIV and history of pulmonary emboli for which he is on Xarelto who was discovered several years ago to have a meningioma in the right cerebellopontine angle.  This was discovered because of double vision for which he has been followed by Dr. Jonathan Villeda here at the [facility].   Due to location of tumor decision for surgical resection was made.     Precautions/Limitations fall precautions  (craniotomy)   Heart Disease  "Risk Factors Gender;Medical history;Lack of physical activity   General Observations Girlfriend has a few stairs to enter home   Cognitive Status Examination   Orientation orientation to person, place and time   Level of Consciousness alert   Follows Commands and Answers Questions 100% of the time   Personal Safety and Judgment impaired   Memory intact   Posture    Posture Kyphosis   Range of Motion (ROM)   ROM Comment WFL   Strength   Strength Comments 5/5 throughout BLE   Bed Mobility   Bed Mobility Comments Supine<>sit with CGAx1   Transfer Skills   Transfer Comments STS with CGAx1   Gait   Gait Comments Ambulates 200ft without AD and CGA-minAx1   Balance   Balance Comments minor occasional LOB during gait   General Therapy Interventions   Planned Therapy Interventions balance training;bed mobility training;gait training;neuromuscular re-education;strengthening;transfer training;risk factor education;home program guidelines;progressive activity/exercise   Clinical Impression   Criteria for Skilled Therapeutic Intervention yes, treatment indicated   PT Diagnosis Impaired functional balance   Influenced by the following impairments weakness, fatigued,    Functional limitations due to impairments IND mobility   Clinical Presentation Evolving/Changing   Clinical Presentation Rationale fatigued and minimally lethargic possibly due to medication   Clinical Decision Making (Complexity) Low complexity   Therapy Frequency` 5 times/week   Predicted Duration of Therapy Intervention (days/wks) 4/21/18   Anticipated Equipment Needs at Discharge front wheeled walker   Anticipated Discharge Disposition Home with Outpatient Therapy;Home with Assist   Risk & Benefits of therapy have been explained Yes   Patient, Family & other staff in agreement with plan of care Yes   [facility] AM-[facility] TM \"6 Clicks\"   2016, Trustees of [facility], under license to [employer].  All rights reserved.   6 Clicks Short Forms Basic " "Mobility Inpatient Short Form   [facility] AM-PAC  \"6 Clicks\" V.2 Basic Mobility Inpatient Short Form   1. Turning from your back to your side while in a flat bed without using bedrails? 4 - None   2. Moving from lying on your back to sitting on the side of a flat bed without using bedrails? 3 - A Little   3. Moving to and from a bed to a chair (including a wheelchair)? 3 - A Little   4. Standing up from a chair using your arms (e.g., wheelchair, or bedside chair)? 3 - A Little   5. To walk in hospital room? 3 - A Little   6. Climbing 3-5 steps with a railing? 3 - A Little   Basic Mobility Raw Score (Score out of 24.Lower scores equate to lower levels of function) 19   Total Evaluation Time   Total Evaluation Time (Minutes) 10     "

## 2018-04-14 NOTE — PLAN OF CARE
Problem: Patient Care Overview  Goal: Plan of Care/Patient Progress Review  Pt settled to 6A. VSS. A&O x4. Neuro unchanged. Report given to RN taking over cares. Pt laying comfortable in bed.

## 2018-04-14 NOTE — PLAN OF CARE
Problem: Patient Care Overview  Goal: Plan of Care/Patient Progress Review  Discharge Planner PT   Patient plan for discharge: agreeable to rehab  Current status: Ambulates 160ft without AD and CGAx1.  Occasionally with reach for railing in [place] to steady self.  Initially, is lethargic.  Supine<>sit with CGAx1.  STS with CGAx1.  Min Ax1 on 1 occasion to maintain standing balance. Educated on craniotomy precautions.  P  Barriers to return to prior living situation: lethargic, weakness, impaired balance, medical status  Recommendations for discharge: currently would benefit from rehab stay, pending clearing from sedation and meds may progress safely home.   Rationale for recommendations: Skilled PT in order to continue to increase functional strengthening and activity tolerance.        Entered by: Giovanni Josue 04/14/2018 4:28 PM

## 2018-04-14 NOTE — PROGRESS NOTES
Neurosurgery Progress Note     Name:   Charles Santos  Age/Sex: 58M  Rm: [id number]  MRN:  [id number]  Staff: JOHNNY  Date of Admission: 4/12/2018  Primary Service:  Neurosurgery  Consulting Services: NONE    HPI:  57-year-old gentleman with well-controlled HIV and history of pulmonary emboli for which he is on Xarelto who was discovered several years ago to have a meningioma in the right cerebellopontine angle.  This was discovered because of double vision for which he has been followed by Dr. Jonathan Villeda here at the [facility].   Due to location of tumor decision for surgical resection was made.      Procedures:   4/12: Suboccipital Craniotomy for Resection of Right Cerebellar-Pontine Angle Meningioma    Daily events:   4/12: OR sx aborted resection; c/b facial nerve palsy (HB3), lost ABRs; Transferred to  Post-Operatively   4/13: continued intractable nausea; given 1 dose IV phenergan, appeared to help.  Speech evaluated patient and recommends keeping patient NPO.  Feeding tube (gastric) placed.     Imaging:  No postoperative images intended.    Examination:  Pre-op: intact     Post-op:  AOx3, PERRL. EOMI. Facial asymmetry (R-sided HB3 palsy). Hearing appears intact bilaterally.   Tongue midline, uvula midline and palate elevated symmetrically.   Motor: 5/5 BUE and BLE. SILT. DTRs 2+ throughout.     Assessment/Plan of care: 58 year old male POD#2 from aborted meningioma resection. 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 Is able to close eye without difficulty. Otherwise, recovering well.     NEURO:   - Post-Operative Pain Control  - No post-op imaging   - Neuro Examination Q 1 HR    CV:  - Goal SBP < 140 mmHg    PULM:   - Incentive Spirometry Q 1 HR     GI/FEN:  - Continue SLP evaluation to assess for PO intake   - GI Prophylaxis Not Indicated  - Electrolyte Replacement Protocol  - Bowel Regimen w/ Senna and Miralax    RENAL/:   - D/C Zendejas POD #1  - Continue 0.9 NaCl 100  mL/hour    HEME/ID:  - Plts > 100k, hgb > 8, INR < 1.5   - DVT Prophylaxis: SCDs to BLE    ENDO:   - No Issues      DISPO:  Anticipate D/C Home 4/13 vs 4/14  Barriers: valuation by therapies, ambulating, BM/flatus, voiding without a Zendejas, tolerating PO diet  Activity: Up in Chair TID; Ambulate w/ Assist  PT: Evaluation Pending  OT: Evaluation Pending    Lacey Mcdermott MD  Neurosurgery PGY3

## 2018-04-14 NOTE — PLAN OF CARE
Problem: Patient Care Overview  Goal: Plan of Care/Patient Progress Review  Outcome: Improving  No acute changes this evening. Neuros unchanged, right facial droop present and right eye weakness. Strengths equal, no drift, no numbness/tingling. Dizzy when standing. Throbbing headache at surgical site, 5mg oxycodone effective. VSS, SBP<160 without intervention. RA, clear LS. Denies nausea, tolerating TF at 15cc/hr, goal 55c. NPO. Awaiting BM, +bowel sounds. Voiding independently, adequate output. Surgical dressing WDL. NS @100cc/hr.     Plan: transfer to . Handoff report given to receiving RN, bedside neuro exam performed with sending and receiving RN at 2315. All belongings sent with patient, clothing, glasses, dentures. Declined notification to S.O. regarding room change.

## 2018-04-14 NOTE — PLAN OF CARE
Problem: Craniotomy/Craniectomy/Cranioplasty (Adult)  Goal: Signs and Symptoms of Listed Potential Problems Will be Absent, Minimized or Managed (Craniotomy/Craniectomy/Cranioplasty)  Signs and symptoms of listed potential problems will be absent, minimized or managed by discharge/transition of care (reference Craniotomy/Craniectomy/Cranioplasty (Adult) CPG).   Outcome: No Change  Alert and oriented x 4. Complained of pain in the incision. Oxycodone 5 mg given as well as tylenol. Stated that the right side of face has numbness and right ear has muffled sounds. Tube feeding increased at 05 to 25 ml/hr. No complaint of nausea. Voided in the urinal. Bed alarm on, calls appropriately.

## 2018-04-14 NOTE — PLAN OF CARE
Problem: Patient Care Overview  Goal: Plan of Care/Patient Progress Review  OT 6A Holding evaluation as PT evaluated and pt may not have acute OT needs.  Will check in with pt on Sunday when more alert.

## 2018-04-14 NOTE — PLAN OF CARE
Problem: Craniotomy/Craniectomy/Cranioplasty (Adult)  Goal: Signs and Symptoms of Listed Potential Problems Will be Absent, Minimized or Managed (Craniotomy/Craniectomy/Cranioplasty)  Signs and symptoms of listed potential problems will be absent, minimized or managed by discharge/transition of care (reference Craniotomy/Craniectomy/Cranioplasty (Adult) CPG).   Outcome: Improving  Pt. Is POD #2 s/p aborted meningioma resection.  AVSS, except for HTN that has been within MD parameters.  Neuros intact except for R. Eye blurred vision, R. Facial droop, and R. Ear [place].  R. Posterior head and RLQ dressings are c/d/i.  Pt. Endorsed a headache this am near the incision site and was given 10mg Oxycodone with good relief.  Pt. Is also taking scheduled Tylenol and has not required any further prn pain medication since this am.  TF infusing at 35 mL/hr and is due to be increased @ 2100.  Pt. Is tolerating TF with no adverse effects.  Pt. Was nauseous this am when he first woke up.  IV Zofran administered x 1 and pt. Has since denied nausea all day.  Diet advanced to DD2 with nectar thickened liquids and pt. Is tolerating it in small bites.  Phos was 2.0 this am and replaced; redraw scheduled for tomorrow am.  Pt. Has ambulated around the nurses' station with assist of one and a GB x 2 today.  Voiding spontaneously, no BM thus far today.  Continue to monitor post op status and intervene prn.

## 2018-04-15 ENCOUNTER — APPOINTMENT (OUTPATIENT)
Dept: OCCUPATIONAL THERAPY | Facility: CLINIC | Age: 58
DRG: 025 | End: 2018-04-15
Attending: NEUROLOGICAL SURGERY
Payer: MEDICARE

## 2018-04-15 ENCOUNTER — APPOINTMENT (OUTPATIENT)
Dept: SPEECH THERAPY | Facility: CLINIC | Age: 58
DRG: 025 | End: 2018-04-15
Attending: NEUROLOGICAL SURGERY
Payer: MEDICARE

## 2018-04-15 LAB
ANION GAP SERPL CALCULATED.3IONS-SCNC: 8 MMOL/L (ref 3–14)
BUN SERPL-MCNC: 21 MG/DL (ref 7–30)
CALCIUM SERPL-MCNC: 9.2 MG/DL (ref 8.5–10.1)
CHLORIDE SERPL-SCNC: 105 MMOL/L (ref 94–109)
CO2 SERPL-SCNC: 24 MMOL/L (ref 20–32)
CREAT SERPL-MCNC: 0.7 MG/DL (ref 0.66–1.25)
ERYTHROCYTE [DISTWIDTH] IN BLOOD BY AUTOMATED COUNT: 14.3 % (ref 10–15)
GFR SERPL CREATININE-BSD FRML MDRD: >90 ML/MIN/1.7M2
GLUCOSE BLDC GLUCOMTR-MCNC: 192 MG/DL (ref 70–99)
GLUCOSE BLDC GLUCOMTR-MCNC: 197 MG/DL (ref 70–99)
GLUCOSE BLDC GLUCOMTR-MCNC: 262 MG/DL (ref 70–99)
GLUCOSE BLDC GLUCOMTR-MCNC: 320 MG/DL (ref 70–99)
GLUCOSE SERPL-MCNC: 241 MG/DL (ref 70–99)
HCT VFR BLD AUTO: 41.1 % (ref 40–53)
HGB BLD-MCNC: 13.5 G/DL (ref 13.3–17.7)
MAGNESIUM SERPL-MCNC: 2.3 MG/DL (ref 1.6–2.3)
MCH RBC QN AUTO: 31 PG (ref 26.5–33)
MCHC RBC AUTO-ENTMCNC: 32.8 G/DL (ref 31.5–36.5)
MCV RBC AUTO: 94 FL (ref 78–100)
PHOSPHATE SERPL-MCNC: 2.6 MG/DL (ref 2.5–4.5)
PLATELET # BLD AUTO: 157 10E9/L (ref 150–450)
POTASSIUM SERPL-SCNC: 4.2 MMOL/L (ref 3.4–5.3)
RBC # BLD AUTO: 4.36 10E12/L (ref 4.4–5.9)
SODIUM SERPL-SCNC: 137 MMOL/L (ref 133–144)
WBC # BLD AUTO: 8.4 10E9/L (ref 4–11)

## 2018-04-15 PROCEDURE — 97535 SELF CARE MNGMENT TRAINING: CPT | Mod: GO | Performed by: OCCUPATIONAL THERAPIST

## 2018-04-15 PROCEDURE — 25000132 ZZH RX MED GY IP 250 OP 250 PS 637: Mod: GY | Performed by: NEUROLOGICAL SURGERY

## 2018-04-15 PROCEDURE — A9270 NON-COVERED ITEM OR SERVICE: HCPCS | Mod: GY | Performed by: NURSE PRACTITIONER

## 2018-04-15 PROCEDURE — 25000132 ZZH RX MED GY IP 250 OP 250 PS 637: Mod: GY | Performed by: STUDENT IN AN ORGANIZED HEALTH CARE EDUCATION/TRAINING PROGRAM

## 2018-04-15 PROCEDURE — 27210429 ZZH NUTRITION PRODUCT INTERMEDIATE LITER

## 2018-04-15 PROCEDURE — A9270 NON-COVERED ITEM OR SERVICE: HCPCS | Mod: GY | Performed by: NEUROLOGICAL SURGERY

## 2018-04-15 PROCEDURE — 12000008 ZZH R&B INTERMEDIATE UMMC

## 2018-04-15 PROCEDURE — 80048 BASIC METABOLIC PNL TOTAL CA: CPT | Performed by: NEUROLOGICAL SURGERY

## 2018-04-15 PROCEDURE — 25000128 H RX IP 250 OP 636: Performed by: STUDENT IN AN ORGANIZED HEALTH CARE EDUCATION/TRAINING PROGRAM

## 2018-04-15 PROCEDURE — 85027 COMPLETE CBC AUTOMATED: CPT | Performed by: NEUROLOGICAL SURGERY

## 2018-04-15 PROCEDURE — A9270 NON-COVERED ITEM OR SERVICE: HCPCS | Mod: GY | Performed by: STUDENT IN AN ORGANIZED HEALTH CARE EDUCATION/TRAINING PROGRAM

## 2018-04-15 PROCEDURE — 25000131 ZZH RX MED GY IP 250 OP 636 PS 637: Mod: GY | Performed by: STUDENT IN AN ORGANIZED HEALTH CARE EDUCATION/TRAINING PROGRAM

## 2018-04-15 PROCEDURE — 40000225 ZZH STATISTIC SLP WARD VISIT: Performed by: SPEECH-LANGUAGE PATHOLOGIST

## 2018-04-15 PROCEDURE — 84100 ASSAY OF PHOSPHORUS: CPT | Performed by: NEUROLOGICAL SURGERY

## 2018-04-15 PROCEDURE — 00000146 ZZHCL STATISTIC GLUCOSE BY METER IP

## 2018-04-15 PROCEDURE — 36415 COLL VENOUS BLD VENIPUNCTURE: CPT | Performed by: NEUROLOGICAL SURGERY

## 2018-04-15 PROCEDURE — 40000133 ZZH STATISTIC OT WARD VISIT: Performed by: OCCUPATIONAL THERAPIST

## 2018-04-15 PROCEDURE — 25000132 ZZH RX MED GY IP 250 OP 250 PS 637: Mod: GY | Performed by: NURSE PRACTITIONER

## 2018-04-15 PROCEDURE — 97530 THERAPEUTIC ACTIVITIES: CPT | Mod: GO | Performed by: OCCUPATIONAL THERAPIST

## 2018-04-15 PROCEDURE — 97165 OT EVAL LOW COMPLEX 30 MIN: CPT | Mod: GO | Performed by: OCCUPATIONAL THERAPIST

## 2018-04-15 PROCEDURE — 92526 ORAL FUNCTION THERAPY: CPT | Mod: GN | Performed by: SPEECH-LANGUAGE PATHOLOGIST

## 2018-04-15 PROCEDURE — 83735 ASSAY OF MAGNESIUM: CPT | Performed by: NEUROLOGICAL SURGERY

## 2018-04-15 RX ORDER — NICOTINE POLACRILEX 4 MG
15-30 LOZENGE BUCCAL
Status: DISCONTINUED | OUTPATIENT
Start: 2018-04-15 | End: 2018-04-15

## 2018-04-15 RX ORDER — DEXTROSE MONOHYDRATE 25 G/50ML
25-50 INJECTION, SOLUTION INTRAVENOUS
Status: DISCONTINUED | OUTPATIENT
Start: 2018-04-15 | End: 2018-04-15

## 2018-04-15 RX ORDER — MAGNESIUM CARB/ALUMINUM HYDROX 105-160MG
30 TABLET,CHEWABLE ORAL ONCE
Status: DISCONTINUED | OUTPATIENT
Start: 2018-04-15 | End: 2018-04-17 | Stop reason: HOSPADM

## 2018-04-15 RX ADMIN — MULTIVITAMIN 15 ML: LIQUID ORAL at 08:09

## 2018-04-15 RX ADMIN — ACETAMINOPHEN 975 MG: 325 TABLET, FILM COATED ORAL at 06:19

## 2018-04-15 RX ADMIN — EFAVIRENZ 600 MG: 200 CAPSULE, GELATIN COATED ORAL at 20:10

## 2018-04-15 RX ADMIN — MINERAL OIL AND WHITE PETROLATUM: 150; 830 OINTMENT OPHTHALMIC at 20:11

## 2018-04-15 RX ADMIN — DEXAMETHASONE SODIUM PHOSPHATE 4 MG: 4 INJECTION, SOLUTION INTRAMUSCULAR; INTRAVENOUS at 10:25

## 2018-04-15 RX ADMIN — INSULIN ASPART 3 UNITS: 100 INJECTION, SOLUTION INTRAVENOUS; SUBCUTANEOUS at 21:24

## 2018-04-15 RX ADMIN — Medication 250 MG: at 20:09

## 2018-04-15 RX ADMIN — POLYETHYLENE GLYCOL 3350 17 G: 17 POWDER, FOR SOLUTION ORAL at 08:09

## 2018-04-15 RX ADMIN — ACETAMINOPHEN 975 MG: 325 TABLET, FILM COATED ORAL at 15:59

## 2018-04-15 RX ADMIN — EMTRICITABINE 200 MG: 200 CAPSULE ORAL at 20:10

## 2018-04-15 RX ADMIN — TENOFOVIR DISOPROXIL FUMARATE 300 MG: 300 TABLET, COATED ORAL at 20:10

## 2018-04-15 RX ADMIN — ACYCLOVIR 800 MG: 800 TABLET ORAL at 20:10

## 2018-04-15 RX ADMIN — PANTOPRAZOLE SODIUM 40 MG: 40 TABLET, DELAYED RELEASE ORAL at 20:10

## 2018-04-15 RX ADMIN — DEXAMETHASONE SODIUM PHOSPHATE 4 MG: 4 INJECTION, SOLUTION INTRA-ARTICULAR; INTRALESIONAL; INTRAMUSCULAR; INTRAVENOUS; SOFT TISSUE at 18:23

## 2018-04-15 RX ADMIN — AMLODIPINE BESYLATE 10 MG: 10 TABLET ORAL at 20:10

## 2018-04-15 RX ADMIN — MINERAL OIL AND WHITE PETROLATUM: 150; 830 OINTMENT OPHTHALMIC at 08:09

## 2018-04-15 RX ADMIN — METOPROLOL TARTRATE 100 MG: 100 TABLET, FILM COATED ORAL at 20:10

## 2018-04-15 RX ADMIN — DEXAMETHASONE SODIUM PHOSPHATE 4 MG: 4 INJECTION, SOLUTION INTRAMUSCULAR; INTRAVENOUS at 02:10

## 2018-04-15 RX ADMIN — MINERAL OIL AND WHITE PETROLATUM: 150; 830 OINTMENT OPHTHALMIC at 12:15

## 2018-04-15 RX ADMIN — MINERAL OIL AND WHITE PETROLATUM: 150; 830 OINTMENT OPHTHALMIC at 16:02

## 2018-04-15 ASSESSMENT — VISUAL ACUITY
OU: OTHER (SEE COMMENT)

## 2018-04-15 ASSESSMENT — ACTIVITIES OF DAILY LIVING (ADL): PREVIOUS_RESPONSIBILITIES: MEAL PREP;HOUSEKEEPING;LAUNDRY;SHOPPING;FINANCES;MEDICATION MANAGEMENT

## 2018-04-15 NOTE — PLAN OF CARE
Problem: Patient Care Overview  Goal: Plan of Care/Patient Progress Review  Discharge Planner SLP   Patient plan for discharge: Unknown   Current status: Pt continues to be at increased risk for aspiration due to oral deficits.  Pt demonstrated reduced s/s of aspiration upon trials this date and good awareness.  Recommend advance diet to regular textures and nectar thick liquids when alert and upright.  Pt will need to take small bites/sips at a slow pace while alternating consistencies.    Barriers to return to prior living situation: None from ST perspective   Recommendations for discharge: Dysphagia intervention at next level of care   Rationale for recommendations: Anticipate ongoing needs        Entered by: Erica Hilario 04/15/2018 8:13 AM

## 2018-04-15 NOTE — PLAN OF CARE
Problem: Craniotomy/Craniectomy/Cranioplasty (Adult)  Goal: Signs and Symptoms of Listed Potential Problems Will be Absent, Minimized or Managed (Craniotomy/Craniectomy/Cranioplasty)  Signs and symptoms of listed potential problems will be absent, minimized or managed by discharge/transition of care (reference Craniotomy/Craniectomy/Cranioplasty (Adult) CPG).   Outcome: Improving  Pt. Is POD #3 s/p aborted meningioma resection.  AVSS, except for HTN that has been within MD parameters.  Neuros intact except for R. Eye blurred vision (pt. States it has improved since yesterday), R. Facial droop, and R. Ear [place].  R. Posterior head incision c/d/i and RLQ dressing is c/d/i.  Pt. States that headache has been tolerable with scheduled Tylenol alone; no need for prn pain meds. Pt. Has been tolerating continuous TF @ 55 mL/hr.  TF turned off @ 1800 in prep for nighttime cyclic feeds from [id number].  Dr. Whelan notified that pt.'s BG levels remain high, so he increased Insulin to the high resistance s/s and changed BG checks to q4h. Diet advanced this am to regular diet with nectar thickened liquids and pt. Is tolerating it well.  Pt. ambulated around the nurses' station x 3 with assist of one and a GB.  Voiding spontaneously, loose BM x 1 today; please hold evening bowel meds. The plan is for pt. To dc to TCU when medically stable. Continue to monitor post op status and intervene prn.

## 2018-04-15 NOTE — PROGRESS NOTES
04/15/18 1000   Quick Adds   Type of Visit Initial Occupational Therapy Evaluation   Living Environment   Lives With alone   Living Arrangements apartment   Number of Stairs to Enter Home 0   Number of Stairs Within Home 0   Living Environment Comment Pt lives alone but will stay with girlfriend in her house at NH. She has 5 stairs to enter. Doesn't work.  INd with ADLs at baseline.    Self-Care   Dominant Hand right   Usual Activity Tolerance good   Current Activity Tolerance moderate   Regular Exercise no   Activity/Exercise/Self-Care Comment Pt reports being sedentary.    Functional Level Prior   Ambulation 0-->independent   Transferring 0-->independent   Toileting 0-->independent   Bathing 0-->independent   Dressing 0-->independent   Eating 0-->independent   Cognition 0 - no cognition issues reported   Fall history within last six months no   Prior Functional Level Comment INd prior   General Information   Onset of Illness/Injury or Date of Surgery - Date 04/12/18   Referring Physician Gregorio Cook MD   Patient/Family Goals Statement none stated   Additional Occupational Profile Info/Pertinent History of Current Problem 58 year old male POD#3 from aborted meningioma resection. Post-op developed House-Brackmann 3 right sided facial droop   Precautions/Limitations fall precautions  (crani)   General Observations Pt sleeping upon arrival, easily awakened. Pleasant and agreeable.    General Info Comments activity: up w A   Cognitive Status Examination   Orientation orientation to person, place and time   Level of Consciousness alert   Able to Follow Commands mild impairment   Personal Safety (Cognitive) mild impairment;at risk behaviors demonstrated;decreased awareness, need for assist;impulsive   Cognitive Comment Needs additional assessment. Food arrived so formal screen did not happen.  Pt demonstrating impulsivity and decreased awareness of deficits and precautions.    Visual Perception   Visual  Perception Wears glasses   Visual Perception Comments pt has post op deficits, R eye blurry and difficult to blink   Pain Assessment   Patient Currently in Pain Yes, see Vital Sign flowsheet   Integumentary/Edema   Integumentary/Edema no deficits were identifed   Posture   Posture not impaired   Range of Motion (ROM)   ROM Quick Adds No deficits were identified   Strength   Strength Comments intact   Hand Strength   Hand Strength Comments intact   Muscle Tone Assessment   Muscle Tone Quick Adds No deficits were identified   Coordination   Upper Extremity Coordination No deficits were identified   Mobility   Bed Mobility Comments Mod I using log roll IND   Transfer Skills   Transfer Comments close CGA using gait belt, unsteady   Transfer Skill: Sit to Stand   Level of Smith: Sit/Stand stand-by assist   Toilet Transfer   Toilet Transfer Comments CGA with VC, pt got up without assist after instructed to call for SBA due to poor balance.    Transfer Skill: Toilet Transfer   Level of Smith: Toilet stand-by assist   Balance   Balance Comments below his baseline, unsteady, fall risk   Upper Body Dressing   Level of Smith: Dress Upper Body minimum assist (75% patients effort)   Lower Body Dressing   Level of Smith: Dress Lower Body independent   Physical Assist/Nonphysical Assist: Dress Lower Body verbal cues   Toileting   Level of Smith: Toilet independent   Physical Assist/Nonphysical Assist: Toilet verbal cues   Grooming   Level of Smith: Grooming stand-by assist   Physical Assist/Nonphysical Assist: Grooming verbal cues   Eating/Self Feeding   Level of Smith: Eating independent   Instrumental Activities of Daily Living (IADL)   Previous Responsibilities meal prep;housekeeping;laundry;shopping;finances;medication management   Activities of Daily Living Analysis   Impairments Contributing to Impaired Activities of Daily Living balance impaired;cognition impaired;pain;post  surgical precautions   General Therapy Interventions   Planned Therapy Interventions ADL retraining;IADL retraining   Clinical Impression   Criteria for Skilled Therapeutic Interventions Met yes, treatment indicated   OT Diagnosis post op craniotomy   Influenced by the following impairments headache, balance and cognitive decline, precautions   Assessment of Occupational Performance 1-3 Performance Deficits   Identified Performance Deficits home management, bathing, dressing   Clinical Decision Making (Complexity) Low complexity   Therapy Frequency daily   Predicted Duration of Therapy Intervention (days/wks) 1 week   Anticipated Discharge Disposition Home with Assist;Home with Home Therapy;Transitional Care Facility   Risks and Benefits of Treatment have been explained. Yes   Patient, Family & other staff in agreement with plan of care Yes   Total Evaluation Time   Total Evaluation Time (Minutes) 5

## 2018-04-15 NOTE — PLAN OF CARE
Problem: Craniotomy/Craniectomy/Cranioplasty (Adult)  Goal: Signs and Symptoms of Listed Potential Problems Will be Absent, Minimized or Managed (Craniotomy/Craniectomy/Cranioplasty)  Signs and symptoms of listed potential problems will be absent, minimized or managed by discharge/transition of care (reference Craniotomy/Craniectomy/Cranioplasty (Adult) CPG).   POD #3 s/p aborted meningioma resection. VSS. A&O x4. Neuro unchanged: R eye blurry, R facial droop, and R ear [place]. Denies pain and nausea. DD2 diet with nectar thick liquids. TF running at goal of 55 mL/hr. Voiding spontaneously. 1 large BM overnight. Up with 1 and GB. R posterior head incision sutured CDI and RLQ dressing CDI. Continue to monitor and follow current POC.

## 2018-04-15 NOTE — PLAN OF CARE
Problem: Patient Care Overview  Goal: Plan of Care/Patient Progress Review  Discharge Planner OT  6A [place]  Patient plan for discharge: home with gf to her house with 5 stairs to enter  Current status: Pt currently below baseline for balance and cognition. Impulsive with poor balance, high fall risk during house hold distance functional moiblity with unilateral support on IV pole and standing ADLs in the bathroom.    Barriers to return to prior living situation: new precautions, decreased insight into deficits, fall risk  Recommendations for discharge: TCU  Rationale for recommendations: Due to fall risk and new vision, cognitive and balance deficits.  Pending progress pt may be able to progress to home with gf/24h assist initially       Entered by: Caro Feldman 04/15/2018 10:15 AM

## 2018-04-15 NOTE — PROGRESS NOTES
Neurosurgery Progress Note    Name:  Charles Santos  Age/Sex: 58M  Rm: [id number]  MRN:  [id number]  Staff: JOHNNY  Date of Admission: 4/12/2018  Primary Service:  Neurosurgery  Consulting Services: NONE    HPI:  57-year-old gentleman with well-controlled HIV and history of pulmonary emboli for which he is on Xarelto who was discovered several years ago to have a meningioma in the right cerebellopontine angle.  This was discovered because of double vision for which he has been followed by Dr. Jonathan Villeda here at the [facility].   Due to location of tumor decision for surgical resection was made.     Procedures:   4/12: Suboccipital Craniotomy for Resection of Right Cerebellar-Pontine Angle Meningioma    Daily events:   4/12: OR sx aborted resection; c/b facial nerve palsy (HB3), lost ABRs; Transferred to  Post-Operatively   4/13: continued intractable nausea; given 1 dose IV phenergan, appeared to help.  Speech evaluated patient and recommends keeping patient NPO.  Feeding tube (gastric) placed.  4/14: DD2 with nectar thickened liquids started. Ambulating with assistance.      Imaging:  No postoperative images intended.    Examination:  Pre-op: intact     Post-op:  AOx3, PERRL. EOMI. Facial asymmetry (R-sided HB3 palsy). Hearing appears intact bilaterally.   Tongue midline, uvula midline and palate elevated symmetrically.   Motor: 5/5 BUE and BLE. SILT. DTRs 2+ throughout.     Assessment/Plan of care: 58 year old male POD#3 from aborted meningioma resection. 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 Otherwise, recovering well.     NEURO:   - Post-Operative Pain Control  - No post-op imaging   - Neuro Examination Q 1 HR    CV:  - Goal SBP < 140 mmHg    PULM:   - Incentive Spirometry Q 1 HR     GI/FEN:  - SLP evaluation   - GI Prophylaxis Not Indicated  - Electrolyte Replacement Protocol  - Bowel Regimen w/ Senna and Miralax    RENAL/:   - D/C Zendejas POD #1    HEME/ID:  - Plts > 100k, hgb > 8, INR  < 1.5   - DVT Prophylaxis: SCDs to BLE      ENDO:   - No Issues      DISPO:  Anticipate D/C Home 4/16  Barriers: adequate PO intake, valuation by therapies, ambulating, BM/flatus, voiding without a Zendejas, tolerating PO diet  Activity: Up in Chair TID; Ambulate w/ Assist  PT: Rehab  OT: Evaluation Pending    -----------------------------------  Ellis Whelan MD, MS  Neurosurgery PGY-1

## 2018-04-16 ENCOUNTER — APPOINTMENT (OUTPATIENT)
Dept: OCCUPATIONAL THERAPY | Facility: CLINIC | Age: 58
DRG: 025 | End: 2018-04-16
Attending: NEUROLOGICAL SURGERY
Payer: MEDICARE

## 2018-04-16 ENCOUNTER — APPOINTMENT (OUTPATIENT)
Dept: SPEECH THERAPY | Facility: CLINIC | Age: 58
DRG: 025 | End: 2018-04-16
Attending: NEUROLOGICAL SURGERY
Payer: MEDICARE

## 2018-04-16 LAB
ANION GAP SERPL CALCULATED.3IONS-SCNC: 7 MMOL/L (ref 3–14)
BUN SERPL-MCNC: 28 MG/DL (ref 7–30)
CALCIUM SERPL-MCNC: 9.4 MG/DL (ref 8.5–10.1)
CHLORIDE SERPL-SCNC: 105 MMOL/L (ref 94–109)
CO2 SERPL-SCNC: 25 MMOL/L (ref 20–32)
COPATH REPORT: NORMAL
CREAT SERPL-MCNC: 0.66 MG/DL (ref 0.66–1.25)
ERYTHROCYTE [DISTWIDTH] IN BLOOD BY AUTOMATED COUNT: 14.3 % (ref 10–15)
GFR SERPL CREATININE-BSD FRML MDRD: >90 ML/MIN/1.7M2
GLUCOSE BLDC GLUCOMTR-MCNC: 191 MG/DL (ref 70–99)
GLUCOSE BLDC GLUCOMTR-MCNC: 197 MG/DL (ref 70–99)
GLUCOSE BLDC GLUCOMTR-MCNC: 228 MG/DL (ref 70–99)
GLUCOSE BLDC GLUCOMTR-MCNC: 309 MG/DL (ref 70–99)
GLUCOSE SERPL-MCNC: 217 MG/DL (ref 70–99)
HCT VFR BLD AUTO: 42.1 % (ref 40–53)
HGB BLD-MCNC: 13.6 G/DL (ref 13.3–17.7)
MCH RBC QN AUTO: 30.8 PG (ref 26.5–33)
MCHC RBC AUTO-ENTMCNC: 32.3 G/DL (ref 31.5–36.5)
MCV RBC AUTO: 96 FL (ref 78–100)
PLATELET # BLD AUTO: 168 10E9/L (ref 150–450)
POTASSIUM SERPL-SCNC: 4 MMOL/L (ref 3.4–5.3)
RBC # BLD AUTO: 4.41 10E12/L (ref 4.4–5.9)
SODIUM SERPL-SCNC: 138 MMOL/L (ref 133–144)
WBC # BLD AUTO: 7.8 10E9/L (ref 4–11)

## 2018-04-16 PROCEDURE — A9270 NON-COVERED ITEM OR SERVICE: HCPCS | Mod: GY | Performed by: NURSE PRACTITIONER

## 2018-04-16 PROCEDURE — 36415 COLL VENOUS BLD VENIPUNCTURE: CPT | Performed by: STUDENT IN AN ORGANIZED HEALTH CARE EDUCATION/TRAINING PROGRAM

## 2018-04-16 PROCEDURE — 40000133 ZZH STATISTIC OT WARD VISIT

## 2018-04-16 PROCEDURE — 25000132 ZZH RX MED GY IP 250 OP 250 PS 637: Mod: GY | Performed by: STUDENT IN AN ORGANIZED HEALTH CARE EDUCATION/TRAINING PROGRAM

## 2018-04-16 PROCEDURE — 00000146 ZZHCL STATISTIC GLUCOSE BY METER IP

## 2018-04-16 PROCEDURE — 25000132 ZZH RX MED GY IP 250 OP 250 PS 637: Mod: GY | Performed by: NURSE PRACTITIONER

## 2018-04-16 PROCEDURE — 27210429 ZZH NUTRITION PRODUCT INTERMEDIATE LITER

## 2018-04-16 PROCEDURE — 92526 ORAL FUNCTION THERAPY: CPT | Mod: GN

## 2018-04-16 PROCEDURE — 97530 THERAPEUTIC ACTIVITIES: CPT | Mod: GO

## 2018-04-16 PROCEDURE — 97535 SELF CARE MNGMENT TRAINING: CPT | Mod: GO

## 2018-04-16 PROCEDURE — 80048 BASIC METABOLIC PNL TOTAL CA: CPT | Performed by: STUDENT IN AN ORGANIZED HEALTH CARE EDUCATION/TRAINING PROGRAM

## 2018-04-16 PROCEDURE — 40000225 ZZH STATISTIC SLP WARD VISIT

## 2018-04-16 PROCEDURE — 85027 COMPLETE CBC AUTOMATED: CPT | Performed by: STUDENT IN AN ORGANIZED HEALTH CARE EDUCATION/TRAINING PROGRAM

## 2018-04-16 PROCEDURE — A9270 NON-COVERED ITEM OR SERVICE: HCPCS | Mod: GY | Performed by: STUDENT IN AN ORGANIZED HEALTH CARE EDUCATION/TRAINING PROGRAM

## 2018-04-16 PROCEDURE — 25000132 ZZH RX MED GY IP 250 OP 250 PS 637: Mod: GY | Performed by: NEUROLOGICAL SURGERY

## 2018-04-16 PROCEDURE — A9270 NON-COVERED ITEM OR SERVICE: HCPCS | Mod: GY | Performed by: NEUROLOGICAL SURGERY

## 2018-04-16 PROCEDURE — 25000128 H RX IP 250 OP 636: Performed by: STUDENT IN AN ORGANIZED HEALTH CARE EDUCATION/TRAINING PROGRAM

## 2018-04-16 PROCEDURE — 12000008 ZZH R&B INTERMEDIATE UMMC

## 2018-04-16 RX ADMIN — INSULIN ASPART 2 UNITS: 100 INJECTION, SOLUTION INTRAVENOUS; SUBCUTANEOUS at 21:36

## 2018-04-16 RX ADMIN — INSULIN ASPART 4 UNITS: 100 INJECTION, SOLUTION INTRAVENOUS; SUBCUTANEOUS at 07:04

## 2018-04-16 RX ADMIN — ACETAMINOPHEN 650 MG: 325 TABLET, FILM COATED ORAL at 12:56

## 2018-04-16 RX ADMIN — DEXAMETHASONE SODIUM PHOSPHATE 4 MG: 4 INJECTION, SOLUTION INTRA-ARTICULAR; INTRALESIONAL; INTRAMUSCULAR; INTRAVENOUS; SOFT TISSUE at 04:35

## 2018-04-16 RX ADMIN — ACETAMINOPHEN 650 MG: 325 TABLET, FILM COATED ORAL at 18:30

## 2018-04-16 RX ADMIN — ACYCLOVIR 800 MG: 800 TABLET ORAL at 21:31

## 2018-04-16 RX ADMIN — ACETAMINOPHEN 650 MG: 325 TABLET, FILM COATED ORAL at 00:06

## 2018-04-16 RX ADMIN — MULTIVITAMIN 15 ML: LIQUID ORAL at 08:33

## 2018-04-16 RX ADMIN — MINERAL OIL AND WHITE PETROLATUM: 150; 830 OINTMENT OPHTHALMIC at 08:39

## 2018-04-16 RX ADMIN — Medication 250 MG: at 21:33

## 2018-04-16 RX ADMIN — METOPROLOL TARTRATE 100 MG: 100 TABLET, FILM COATED ORAL at 21:33

## 2018-04-16 RX ADMIN — DEXAMETHASONE SODIUM PHOSPHATE 4 MG: 4 INJECTION, SOLUTION INTRA-ARTICULAR; INTRALESIONAL; INTRAMUSCULAR; INTRAVENOUS; SOFT TISSUE at 18:25

## 2018-04-16 RX ADMIN — ACETAMINOPHEN 650 MG: 325 TABLET, FILM COATED ORAL at 08:33

## 2018-04-16 RX ADMIN — TENOFOVIR DISOPROXIL FUMARATE 300 MG: 300 TABLET, COATED ORAL at 21:33

## 2018-04-16 RX ADMIN — INSULIN ASPART 7 UNITS: 100 INJECTION, SOLUTION INTRAVENOUS; SUBCUTANEOUS at 02:00

## 2018-04-16 RX ADMIN — MINERAL OIL AND WHITE PETROLATUM: 150; 830 OINTMENT OPHTHALMIC at 11:44

## 2018-04-16 RX ADMIN — EFAVIRENZ 600 MG: 200 CAPSULE, GELATIN COATED ORAL at 21:40

## 2018-04-16 RX ADMIN — EMTRICITABINE 200 MG: 200 CAPSULE ORAL at 21:39

## 2018-04-16 RX ADMIN — INSULIN ASPART 3 UNITS: 100 INJECTION, SOLUTION INTRAVENOUS; SUBCUTANEOUS at 18:24

## 2018-04-16 RX ADMIN — AMLODIPINE BESYLATE 10 MG: 10 TABLET ORAL at 21:32

## 2018-04-16 RX ADMIN — PANTOPRAZOLE SODIUM 40 MG: 40 TABLET, DELAYED RELEASE ORAL at 21:32

## 2018-04-16 RX ADMIN — ACETAMINOPHEN 650 MG: 325 TABLET, FILM COATED ORAL at 22:32

## 2018-04-16 RX ADMIN — ACETAMINOPHEN 650 MG: 325 TABLET, FILM COATED ORAL at 04:35

## 2018-04-16 RX ADMIN — MINERAL OIL AND WHITE PETROLATUM: 150; 830 OINTMENT OPHTHALMIC at 22:32

## 2018-04-16 ASSESSMENT — VISUAL ACUITY
OU: OTHER (SEE COMMENT)
OU: BLURRED VISION

## 2018-04-16 NOTE — PLAN OF CARE
Problem: Patient Care Overview  Goal: Plan of Care/Patient Progress Review  Outcome: No Change  AVSS. Neuros intact except: right facial droop, right eye blurred vision and right ear [place]. A&Ox4. Pt c/o of feeling somewhat lethargic/ out of it during hospitalization - no melatonin given tonight and pt slept well. Posterior right head incision intact with sutures - no drainage, Abdomen dressing c-d-i. TF cycled at goal of 55ml/hr - started at 2000 and will be turned off at 0600. On regular diet with nectar thick fluids - no on donna counts. VDSP in urinal. 1 soft BM on the evening. PIV SL. Plan for discharge to TCU when medically stable. Will continue to monitor and follow with POC.

## 2018-04-16 NOTE — PLAN OF CARE
"Problem: Patient Care Overview  Goal: Discharge Needs Assessment  D/I: patient remains on 6A for neuro monitoring as POD#4 of R crani for meningioma resection.  Neuro- unchanged: R eye unable to shut, blurred vision in R eye, [place] R side, gen weakness and ataxia.   CV- WNL  Pulm- on RA with o2 sats in mid-upper 90s. LS clear  GI- Tolerated regular diet with nectars but in small amounts. Pt refused to eat lunch after receiving tray and became upset that we are monitoring his intake so closely because pt \"never eats more than 2 meals a day (breakfast and dinner). Kunal counts ready for breakfast only. Pt has dinner tray in front of him and says he \"will eat it eventually\". Pt on cycled TF via NG tube from 8285-1815 at 55 ml/hr. No BM reported by pt today but pt states that he had diarrhea yesterday and didn't want anything to \"make him miserable in the bathroom today\" BS positive  - voiding adequately,   Skin-head incision is JAD, intact. Eye drops to R eye as pt allows.   Gtts- SL'd  ID- n/a  Labs- WNL ex elevated BG, pt needs to be reminded of compliance with BG checks.  Pain- HA managed effectively with PO tylenol, next available at 2230.  Activity- up with 1 and GB, strong but ataxic.  Drains- WNL  Social- friends at bedside most of day, supportive.  CRRT- n/a  See flow sheets for further details and assessments.  A: pt ready for DC to rehab tomorrow but pt is refusing and stated that \"I will leave this place in the morning when my friend gets here to take me home\", Dr. Harman aware.  P: continue to monitor, notify MD of significant changes.        "

## 2018-04-16 NOTE — PLAN OF CARE
Problem: Patient Care Overview  Goal: Plan of Care/Patient Progress Review  Discharge Planner OT 6A  Patient plan for discharge: Girlfriend's home w/ 24 hour assist.  Agreeable to OP rehab.  Current status: Alert and seated in chair, pt provided crani precautions handout and education.  Pt scored 20/30 on MoCA 7.1, indicating mild cognitive impairment, which is below baseline.  Pt slightly impulsive to stand and required 1 VC to wait for gait belt for safety.  IND bed w/ mobility.  Pt requires CGA w/ hand hold assist, and gait belt for safe tx to bathroom.  Pt completed shower on shower chair, LB and UB dressing while seated on shower chair, and standing g/h at sink w/ set up and SBA.     Barriers to return to prior living situation: Precautions, falls risk, cognition below baseline.  Recommendations for discharge: Per plan established by the OT, the recommendation for dc location is TCU.  Pending progress, home w/ 24 hour assist may be appropriate.  Rationale for recommendations: Pt would benefit from continued therapy to address cognitive deficits and return to baseline of IND ADL and IADL participation.       Entered by: Henok Felix 04/16/2018 3:23 PM

## 2018-04-16 NOTE — PLAN OF CARE
Problem: Patient Care Overview  Goal: Plan of Care/Patient Progress Review  Discharge Planner SLP   Patient plan for discharge: Pt states he is ready to leave  Current status: Recommend continue regular diet with nectar thick liquids. Pt to alternate consistencies, sit upright, take small bites/sips, and pace self with PO intake. Pt reporting limited intake of nectar thick liquids due to dislike. Pt with improved swallow function and oral movement of bolus this date; however, continues to be at risk for aspiration with thin liquids.   Barriers to return to prior living situation: None from ST perspective  Recommendations for discharge: ST intervention at next level of care  Rationale for recommendations: Anticipate ongoing need for ST services       Entered by: Juju Melara 04/16/2018 1:04 PM

## 2018-04-16 NOTE — PROGRESS NOTES
Name: Yesi Diaz  Age/Sex: 38F  Rm: [id number]  MRN:  [id number]  Staff: Samaria   Date of Admission:   Primary Service: Oncology  Consulting Services: NSG    HPI:  Ms. Diaz is a 38 year old female with a history of metastatic breast cancer, PE, gastroparesis, s/p total abdominal hysterectomy, right mastectomy who presents for evaluation of a headache. Patient complains of a constant headache described as migraine-like which has been ongoing since , 17.  She has associated photophobia as well as vision changes. She describes her vision changes as though she feels she is misjudging distance because she keeps running into things, along with difficulty seeing things she is holding. She reports she had an episode on  where she was holding a box of crackers but didn't realize until suddenly she was able to see that she was holding it. She reports a history of migraines for which she has been seen and evaluated by multiple providers, but reports this headache is significantly worse compared to any of her prior migraines.  MRI of brain was obtained which revealed right parietal cystic like mass with vasogenic edema.    Original dx of breast cancer in 2016, with subsequent chemotherapy and right breast mastectomy in 2016, followed by chemotherapy. Most recently has been treated by bicalutamide (Casodex).     Procedures:   : stealth assisted right parieto-occipital craniotomy for tumor resection      Daily events:   : relayed above symptoms to her oncologist, who recommended she come into hospital for further evaluation   : OR for tumor resection, transferred to neuro ICU for post-operative cares  4/15: Visual perception improved.     Imagin/11: MRI brain: New rim-enhancing cystic lesion 4.6 x 3.0 x 4.8 cm in the right parietal lobe with surrounding vasogenic edema and mass effect on the adjacent sulci and right lateral ventricle is highly concerning for metastatic disease in the  setting of known breast cancer.   4/15: MRI brain shows no strokes or bleeds. there s no residual tumor.    Examination:  AOx3. PERRL, EOMI. Face symmetric, tongue midline, uvula midline and palate elevated symmetrically.   Motor: 5/5 BUE and BLE. SILT. No pronator drift.   Normal FNF, normal HTS. No tamayo or babinski.     Assessment/Plan of care: 38 year old female POD#2 from right craniotomy for tumor resection.       NEURO:   - Neuro Examination Q 1 HR  - Decadron 4Q6H for cerebral edema with brain compression   - Protonix for GI ppx while on steroids  - Post-operative MRI    CV:  - Goal SBP < 140 mmHg    PULM:   - Incentive Spirometry Q 1 HR     GI/FEN:  - Advance Diet As Tolerated  - GI Prophylaxis Not Indicated  - Electrolyte Replacement Protocol  - Bowel Regimen w/ Senna and Miralax    RENAL/:   - D/C Zendejas POD #1  - Continue 0.9 NaCl 100 mL/hour    HEME/ID:  - Plts > 100k, hgb > 8, INR < 1.5   - DVT Prophylaxis: SCDs to BLE      ENDO:   - No Issues      DISPO:  Anticipate D/C Home  4/16  Barriers: Evaluation by therapies, ambulating, BM/flatus, voiding without a Zendejas, tolerating PO diet, pain controlled on PO medications  Activity: Up in Chair TID; Ambulate w/ Assist  PT: Evaluation Pending  OT: Evaluation Pending      Please contact neurosurgery resident on call with questions.    Dial * * [phone], enter [phone] when prompted.       Gregorio Cook MD, PhD  Neurosurgery PGY-2

## 2018-04-16 NOTE — PROGRESS NOTES
Neurosurgery Progress Note    Name:  Charles Santos  Age/Sex: 58M  Rm: 6214-01  MRN:  [id number]  Staff: Kamar/Samaria  Date of Admission: 4/12/2018  Primary Service:  Neurosurgery  Consulting Services: NONE    HPI:  57-year-old gentleman with well-controlled HIV and history of pulmonary emboli for which he is on Xarelto who was discovered several years ago to have a meningioma in the right cerebellopontine angle.  This was discovered because of double vision for which he has been followed by Dr. Jonathan Villeda here at the [facility].   Due to location of tumor decision for surgical resection was made.     Procedures:   4/12: Suboccipital Craniotomy for Resection of Right Cerebellar-Pontine Angle Meningioma    Daily events:   4/12: OR sx aborted resection; c/b facial nerve palsy (HB3), lost ABRs; Transferred to  Post-Operatively   4/13: continued intractable nausea; given 1 dose IV phenergan, appeared to help.  Speech evaluated patient and recommends keeping patient NPO.  Feeding tube (gastric) placed.  4/14: DD2 with nectar thickened liquids started. Ambulating with assistance.    4/15: Diet updated to regular diet with nectar thick liquids. Cycled tube feeds.    Imaging:  No postoperative images intended.    Examination:  Pre-op: intact     Post-op:  AOx3, PERRL. EOMI. Facial asymmetry (R-sided HB3 palsy). Hearing appears intact bilaterally.   Tongue midline, uvula midline and palate elevated symmetrically.   Motor: 5/5 BUE and BLE. SILT. DTRs 2+ throughout.     Assessment/Plan of care: 58 year old male POD#4 from aborted meningioma resection. 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 Otherwise, recovering well.     NEURO:   - Post-Operative Pain Control  - No post-op imaging   - Neuro Examination Q 1 HR    CV:  - Goal SBP < 140 mmHg    PULM:   - Incentive Spirometry Q 1 HR     GI/FEN:  - SLP evaluation   - GI Prophylaxis Not Indicated  - Electrolyte Replacement Protocol  - Bowel Regimen w/ Senna  and Miralax    RENAL/:   - D/C [place] POD #1    HEME/ID:  - Plts > 100k, hgb > 8, INR < 1.5   - DVT Prophylaxis: SCDs to BLE    ENDO:   - No Issues    DISPO:  Anticipate placement  Barriers: None  Activity: Up in Chair TID; Ambulate w/ Assist  PT: Rehab  OT: TCU    -----------------------------------  Ellis Whelan MD, MS  Neurosurgery PGY-1

## 2018-04-17 ENCOUNTER — APPOINTMENT (OUTPATIENT)
Dept: PHYSICAL THERAPY | Facility: CLINIC | Age: 58
DRG: 025 | End: 2018-04-17
Attending: NEUROLOGICAL SURGERY
Payer: MEDICARE

## 2018-04-17 ENCOUNTER — APPOINTMENT (OUTPATIENT)
Dept: SPEECH THERAPY | Facility: CLINIC | Age: 58
DRG: 025 | End: 2018-04-17
Attending: NEUROLOGICAL SURGERY
Payer: MEDICARE

## 2018-04-17 ENCOUNTER — APPOINTMENT (OUTPATIENT)
Dept: OCCUPATIONAL THERAPY | Facility: CLINIC | Age: 58
DRG: 025 | End: 2018-04-17
Attending: NEUROLOGICAL SURGERY
Payer: MEDICARE

## 2018-04-17 VITALS
TEMPERATURE: 97.5 F | BODY MASS INDEX: 21.78 KG/M2 | DIASTOLIC BLOOD PRESSURE: 70 MMHG | SYSTOLIC BLOOD PRESSURE: 134 MMHG | RESPIRATION RATE: 16 BRPM | OXYGEN SATURATION: 96 % | WEIGHT: 152.12 LBS | HEART RATE: 65 BPM | HEIGHT: 70 IN

## 2018-04-17 LAB
ANION GAP SERPL CALCULATED.3IONS-SCNC: 10 MMOL/L (ref 3–14)
BUN SERPL-MCNC: 29 MG/DL (ref 7–30)
CALCIUM SERPL-MCNC: 9.2 MG/DL (ref 8.5–10.1)
CHLORIDE SERPL-SCNC: 105 MMOL/L (ref 94–109)
CO2 SERPL-SCNC: 25 MMOL/L (ref 20–32)
CREAT SERPL-MCNC: 0.76 MG/DL (ref 0.66–1.25)
ERYTHROCYTE [DISTWIDTH] IN BLOOD BY AUTOMATED COUNT: 14.1 % (ref 10–15)
GFR SERPL CREATININE-BSD FRML MDRD: >90 ML/MIN/1.7M2
GLUCOSE BLDC GLUCOMTR-MCNC: 210 MG/DL (ref 70–99)
GLUCOSE BLDC GLUCOMTR-MCNC: 249 MG/DL (ref 70–99)
GLUCOSE BLDC GLUCOMTR-MCNC: 267 MG/DL (ref 70–99)
GLUCOSE SERPL-MCNC: 237 MG/DL (ref 70–99)
HCT VFR BLD AUTO: 40.2 % (ref 40–53)
HGB BLD-MCNC: 13.3 G/DL (ref 13.3–17.7)
MCH RBC QN AUTO: 31.2 PG (ref 26.5–33)
MCHC RBC AUTO-ENTMCNC: 33.1 G/DL (ref 31.5–36.5)
MCV RBC AUTO: 94 FL (ref 78–100)
PLATELET # BLD AUTO: 181 10E9/L (ref 150–450)
POTASSIUM SERPL-SCNC: 3.8 MMOL/L (ref 3.4–5.3)
RBC # BLD AUTO: 4.26 10E12/L (ref 4.4–5.9)
SODIUM SERPL-SCNC: 140 MMOL/L (ref 133–144)
WBC # BLD AUTO: 7.9 10E9/L (ref 4–11)

## 2018-04-17 PROCEDURE — 40000133 ZZH STATISTIC OT WARD VISIT

## 2018-04-17 PROCEDURE — A9270 NON-COVERED ITEM OR SERVICE: HCPCS | Mod: GY | Performed by: NURSE PRACTITIONER

## 2018-04-17 PROCEDURE — 80048 BASIC METABOLIC PNL TOTAL CA: CPT | Performed by: STUDENT IN AN ORGANIZED HEALTH CARE EDUCATION/TRAINING PROGRAM

## 2018-04-17 PROCEDURE — 97110 THERAPEUTIC EXERCISES: CPT | Mod: GP | Performed by: REHABILITATION PRACTITIONER

## 2018-04-17 PROCEDURE — 97535 SELF CARE MNGMENT TRAINING: CPT | Mod: GO

## 2018-04-17 PROCEDURE — 36415 COLL VENOUS BLD VENIPUNCTURE: CPT | Performed by: STUDENT IN AN ORGANIZED HEALTH CARE EDUCATION/TRAINING PROGRAM

## 2018-04-17 PROCEDURE — 25000132 ZZH RX MED GY IP 250 OP 250 PS 637: Mod: GY | Performed by: NURSE PRACTITIONER

## 2018-04-17 PROCEDURE — 40000225 ZZH STATISTIC SLP WARD VISIT: Performed by: SPEECH-LANGUAGE PATHOLOGIST

## 2018-04-17 PROCEDURE — 92526 ORAL FUNCTION THERAPY: CPT | Mod: GN | Performed by: SPEECH-LANGUAGE PATHOLOGIST

## 2018-04-17 PROCEDURE — 00000146 ZZHCL STATISTIC GLUCOSE BY METER IP

## 2018-04-17 PROCEDURE — 40000193 ZZH STATISTIC PT WARD VISIT: Performed by: REHABILITATION PRACTITIONER

## 2018-04-17 PROCEDURE — A9270 NON-COVERED ITEM OR SERVICE: HCPCS | Mod: GY | Performed by: STUDENT IN AN ORGANIZED HEALTH CARE EDUCATION/TRAINING PROGRAM

## 2018-04-17 PROCEDURE — 25000132 ZZH RX MED GY IP 250 OP 250 PS 637: Mod: GY | Performed by: STUDENT IN AN ORGANIZED HEALTH CARE EDUCATION/TRAINING PROGRAM

## 2018-04-17 PROCEDURE — 97530 THERAPEUTIC ACTIVITIES: CPT | Mod: GP | Performed by: REHABILITATION PRACTITIONER

## 2018-04-17 PROCEDURE — 97116 GAIT TRAINING THERAPY: CPT | Mod: GP | Performed by: REHABILITATION PRACTITIONER

## 2018-04-17 PROCEDURE — 25000125 ZZHC RX 250: Performed by: STUDENT IN AN ORGANIZED HEALTH CARE EDUCATION/TRAINING PROGRAM

## 2018-04-17 PROCEDURE — 85027 COMPLETE CBC AUTOMATED: CPT | Performed by: STUDENT IN AN ORGANIZED HEALTH CARE EDUCATION/TRAINING PROGRAM

## 2018-04-17 RX ORDER — AMOXICILLIN 250 MG
3 CAPSULE ORAL 2 TIMES DAILY
Qty: 60 TABLET | Refills: 1 | Status: SHIPPED | OUTPATIENT
Start: 2018-04-17 | End: 2020-11-05

## 2018-04-17 RX ORDER — OXYCODONE HYDROCHLORIDE 5 MG/1
5-10 TABLET ORAL
Qty: 30 TABLET | Refills: 0 | Status: SHIPPED | OUTPATIENT
Start: 2018-04-17 | End: 2019-06-18

## 2018-04-17 RX ORDER — POLYETHYLENE GLYCOL 3350 17 G/17G
17 POWDER, FOR SOLUTION ORAL 3 TIMES DAILY
Qty: 7 PACKET | Refills: 1 | Status: SHIPPED | OUTPATIENT
Start: 2018-04-17 | End: 2020-11-05

## 2018-04-17 RX ADMIN — ACETAMINOPHEN 650 MG: 325 TABLET, FILM COATED ORAL at 06:55

## 2018-04-17 RX ADMIN — INSULIN ASPART 3 UNITS: 100 INJECTION, SOLUTION INTRAVENOUS; SUBCUTANEOUS at 01:42

## 2018-04-17 RX ADMIN — MINERAL OIL AND WHITE PETROLATUM: 150; 830 OINTMENT OPHTHALMIC at 12:33

## 2018-04-17 RX ADMIN — MULTIVITAMIN 15 ML: LIQUID ORAL at 09:30

## 2018-04-17 RX ADMIN — ACETAMINOPHEN 650 MG: 325 TABLET, FILM COATED ORAL at 02:35

## 2018-04-17 RX ADMIN — MINERAL OIL AND WHITE PETROLATUM: 150; 830 OINTMENT OPHTHALMIC at 09:35

## 2018-04-17 RX ADMIN — INSULIN ASPART 6 UNITS: 100 INJECTION, SOLUTION INTRAVENOUS; SUBCUTANEOUS at 06:55

## 2018-04-17 RX ADMIN — INSULIN ASPART 5 UNITS: 100 INJECTION, SOLUTION INTRAVENOUS; SUBCUTANEOUS at 09:29

## 2018-04-17 RX ADMIN — FLUTICASONE PROPIONATE 2 SPRAY: 50 SPRAY, METERED NASAL at 09:29

## 2018-04-17 RX ADMIN — ACETAMINOPHEN 650 MG: 325 TABLET, FILM COATED ORAL at 12:32

## 2018-04-17 ASSESSMENT — VISUAL ACUITY: OU: BLURRED VISION

## 2018-04-17 NOTE — PROGRESS NOTES
Nutrition Brief:    Provider order received for calorie counts to start on 4/17 x 3 days. Calorie counts were already initiated on 4/16 x 3 days.    Intervention:  1. Discontinue current calorie count order d/t duplicate order.    Gisel Duckworth RD,LD  Unit Pager [phone]

## 2018-04-17 NOTE — PROGRESS NOTES
VSS; A&O X 4. Neurologically unchanged. Pt discharged to SO's house with SO after going through DC summary and retrieving meds at DC pharmacy.

## 2018-04-17 NOTE — PLAN OF CARE
Problem: Patient Care Overview  Goal: Plan of Care/Patient Progress Review  6A OT:  Discharge Planner OT   Patient plan for discharge: home (refusing rehab)  Current status: Pt declining activity this am, citing frustration with rehab recommendation.  Reviewed recommendation for ARU to work on mobility, cognition, etc to support safe d/c home.  Pt continues to be uninterested and repeatedly states intention to d/c home.  Facilitated discussion on necessary supports at home, including 24/7 assist for ALL mobility and IADL as well as equipment to maximize safety at home.  Pt's SO present and reporting understanding of recommendations.  Continued to emphasize benefits of ARU placement and cite risk for falls if d/c at current level.  Pt and SO continue to state intention to d/c home.    Barriers to return to prior living situation: medical status, cognition, impaired balance, vision deficits, assist for all mobility and ADL/IADL  Recommendations for discharge: ARU  Rationale for recommendations: Pt would benefit greatly from intensive and multidisciplinary rehab to progress safety and independence with ADL, IADL, functional mobility, and SLP goals.  If pt chooses to return home, he will need 24/7 assist for all mobility, ADL, and IADL as well as HH PT, OT, SLP, and nursing.        Entered by: Aalnna Valverde 04/17/2018 12:18 PM

## 2018-04-17 NOTE — PLAN OF CARE
Problem: Patient Care Overview  Goal: Plan of Care/Patient Progress Review  Outcome: Therapy, progress toward functional goals as expected  Discharge Planner SLP   Patient plan for discharge: home  Current status: Patient seen for dysphagia treatment with significant other present. Patient would like to discharge home today despite recommendations for rehab setting. Patient has been tolerating regular diet with nectar thick liquid and reports difficulty chewing tough foods (e.g. Undercooked broccoli). Note one instance of cough after sip of thin liquid. No other overt aspiration signs occurred with additional sips of water or across consistencies. Note slow but functional mastication of solid texture and no oral residue remained. Patient able to follow swallow strategies given min to no cues. Recommend advance diet to regular with thin liquids given swallow strategies (no straws, fully upright position, small single sips/bites). Patient to self-select softer menu items for ease of chewing. Provided written and verbal education about swallow strategies, signs/symptoms of aspiration and swallow exercises. Instructed patient to notify medical team if signs/symptoms of aspiration occur. Consulted with RN and NP.  Barriers to return to prior living situation: reduced safety awareness  Recommendations for discharge: ST at discharge for dysphagia; defer to OT/PT  Rationale for recommendations: ongoing ST for dysphagia for swallow exercises, diet tolerance and swallow strategies       Entered by: Alanna Marroquin 04/17/2018 11:52 AM

## 2018-04-17 NOTE — DISCHARGE SUMMARY
[facility] Discharge Summary and Instructions    Charles Santos MRN# [id number]   Age: 58 year old YOB: 1960     Date of Admission:  4/12/2018  Date of Discharge::  4/17/2018  Admitting Physician:  Azeem Galvin MD  Discharge Physician:  Azeem Galvin MD          Admission Diagnoses:   Brain mass [G93.9]  S/P craniotomy [Z98.890]          Discharge Diagnosis:   Brain mass [G93.9]  S/P craniotomy [Z98.890]          Procedures:   4/12/2018  1. Right retromastoid craniectomy and subtotal resection of petrous and CP angle meningioma.  2. Abdominal fat graft  3. Intraoperative computer image guidance  4. Use of operating microscope  5. Intraoperative monitoring of facial nerve  6. Intraoperaqtive monitoring of acoustic nerve           Brief History of Illness:   Mr. Santos has a right-sided meningioma on the petrous bone near the cerebellopontine angle.  It had been followed with serial MRI scans and recently showed some growth.  He saw Dr. Escalante at [facility] who recommended surgical removal.  Shortly before the operation was scheduled, Dr. Escalante became unavailable to do the operation and Mr. Santos came to see Dr Azeem Galvin.  We discussed both surgical treatment and the alternative of radiosurgical treatment.  Given his history of deep venous thrombosis and HIV, the radiosurgical options were discussed very seriously with him, but he had considered it quite carefully and it was clear that he wanted to proceed with surgery.  Therefore, he was brought to the operating room for a suboccipital resection of his tumor.            Hospital Course:   The patient's procedure was aborted due to loss of neuro monitoring signals intra operatively.  Patient was then transferred to the neuro Intensive Care Unit where it was noted he had developed a right facial palsy, House Brackmann 3.  The patient initially had intractable nausea which eventually improved with time and anti  emetics.   Patient was evaluated by Speech therapy and a  Nothing by mouth diet was initially recommended.  Feeding tube for enteral feeding and medication administration was then placed.    Patient remained otherwise medically and neurologically stable and was able to transfer to general neurosurgical floor in the evening of 4/13/2018.  Patient's diet was advanced daily with supplemental tube feedings overnight until 4/17/2018.  Speech at that time had recommended a regular diet with thin liquids, patient showed improved swallow function and oral movement daily.  Calorie counts and nutrition were adequate prior to discharge.   Physical and Occupational therapies evaluated patient and recommended on-going therapy at acute rehab, however the patient vehemently refused this recommendation.  A great deal of time was spent with the patient and his significant other explaining the rationale behind the recommendation and why rehab would be so helpful.  Risks of not adhering to therapy recommendations were also discussed.  The patient was adamant about going home, therefore home speech, occupational, and physical therapies were arranged, as well as home nursing.    Patient was otherwise medically stable during his stay.  Prior to discharge patient was voiding, pain was adequately controlled on oral medication, he was passing flatus, and medically stable.  Patient will follow up with Dr Azeem Galvin in 2 weeks.     Patient had taken Xarelto pre operatively due to history of pulmonary embolism in 2005.  This medication can be restarted on 4/20/3018.  Patient will continue on anti viral medications as prescribed pre-operatively.  This was all discussed with patient who stated understanding.  Risks potentially associated with refusing therapy recommendations were also once again addressed, patient continued to refuse placement.   Surgical pathology returned as Meningioma (WHO grade I), this was discussed with patient              Discharge Medications:     Current Discharge Medication List      START taking these medications    Details   oxyCODONE IR (ROXICODONE) 5 MG tablet Take 1-2 tablets (5-10 mg) by mouth every 3 hours as needed for other (pain control or improvement in physical function. Hold dose for analgesic side effects.)  Qty: 30 tablet, Refills: 0    Associated Diagnoses: S/P craniotomy      polyethylene glycol (MIRALAX/GLYCOLAX) Packet Take 17 g by mouth 3 times daily  Qty: 7 packet, Refills: 1    Associated Diagnoses: S/P craniotomy      senna-docusate (SENOKOT-S;PERICOLACE) 8.6-50 MG per tablet Take 3 tablets by mouth 2 times daily  Qty: 60 tablet, Refills: 1    Associated Diagnoses: S/P craniotomy         CONTINUE these medications which have CHANGED    Details   rivaroxaban ANTICOAGULANT (XARELTO) 20 MG TABS tablet Take 1 tablet (20 mg) by mouth every evening    Comments: Ok to restart Xarelto on April 20, 2018         CONTINUE these medications which have NOT CHANGED    Details   ascorbic acid (VITAMIN C) 1000 MG TABS Take 2,000 mg by mouth every evening Oh hold since 03/10/2018      polyvinyl alcohol (LIQUIFILM TEARS) 1.4 % ophthalmic solution every morning      tretinoin (RETIN-A) 0.05 % cream Apply topically once a week      metoprolol tartrate (LOPRESSOR) 100 MG tablet Take 100 mg by mouth every evening      esomeprazole (NEXIUM) 40 MG CR capsule Take 40 mg by mouth every evening      acyclovir (ZOVIRAX) 400 MG tablet Take 800 mg by mouth every evening      amLODIPine (NORVASC) 10 MG tablet Take 10 mg by mouth every evening      efavirenz-emtrictabine-tenofovir (ATRIPLA) 600-200-300 MG per tablet Take 1 tablet by mouth every evening      albuterol (PROAIR HFA, PROVENTIL HFA, VENTOLIN HFA) 108 (90 BASE) MCG/ACT inhaler Inhale 2 puffs into the lungs every 6 hours as needed       fluticasone (FLONASE) 50 MCG/ACT nasal spray Spray 2 sprays into both nostrils as needed       VITAMIN D, CHOLECALCIFEROL, PO Take 1,000 mg  by mouth every evening Oh hold since 03/10/2018      MAGNESIUM PO Take 250 mg by mouth every evening Oh hold since 03/10/2018      DOCOSAHEXAENOIC ACID PO Take 1 capsule by mouth every evening Oh hold since 03/10/2018      potassium chloride SA (K-DUR,KLOR-CON M) 20 MEQ tablet Take 40 mEq by mouth every evening Oh hold since 03/10/2018                     Discharge Instructions and Follow-Up:   Discharge diet: Regular   Discharge activity: You may advance activity as tolerated. No strenuous exercise or heay lifting greater than 10 lbs for 4 weeks or until seen and cleared in clinic.   Discharge follow-up: Follow-up with Dr. Azeem Galvin MD in 2 weeks for wound check and post hospitalization follow up     Wound care: Ok to shower,however no scrubbing of the wound and no soaking of the wound, meaning no bathtubs or swimming pools. Pat dry only. Leave wound open to air.  Sutures are not absorbable and need to be removed in 2 weeks. If patient still at rehab by this time, the sutures may be removed by the rehab physician if he or she considers that the wound has healed completely.     Please call if you have:  1. increased pain, redness, drainage, swelling at your incision  2. fevers > 101.5 F degrees  3. with any questions or concerns.  You may reach the Neurosurgery clinic at [phone] during regular work hours. ER at [phone].    and ask for the Neurosurgery Resident on call at [phone], during off hours or weekends.         Discharge Disposition:   Discharged to home        TERESA Henry, CNP  Department of Neurosurgery  Pager: [phone]

## 2018-04-17 NOTE — PLAN OF CARE
Problem: Patient Care Overview  Goal: Plan of Care/Patient Progress Review  Discharge Planner PT 6A  Patient plan for discharge: Home with Home PT, OT and SLP  Current status: Pt amb ~ 200 feet x 2 with CGA->Min A, frequent LOB during gait training, moderate path deviation, reaching for hallway rails, tried using SPC, but pt was unable to appropriately sequence SPC. Pt amb up and down 3 stairs x 2 trials with single handrail and CGA.   Barriers to return to prior living situation: Pt with poor balance, impaired vision, decreased strength, poor safety awareness.   Recommendations for discharge: ARU  Rationale for recommendations: Pt demonstrates skilled need for PT, OT and SLP services. Pt with poor balance, impaired vision, impaired swallow, pt would benefit greatly at this time from ARU to address these deficits and maximize independence for safety. If pt declines rehab stay, pt would need 24/7 supervision and assistance due to his significant balance deficits.        Entered by: Elana Pickens 04/17/2018 10:37 AM

## 2018-04-17 NOTE — PROGRESS NOTES
Calorie Count  Intake recorded for: 4/16 Kcals: 621  Protein: 16g  # Meals Recorded: 2 meals (First - 75% hot cocoa, hash browns, 50% oatmeal, Kitt[place] toast with syrup)      (Second - 100% pudding, 50% potato soup, less chicken stir olson)  # Supplements Recorded: 0

## 2018-04-17 NOTE — PLAN OF CARE
Problem: Patient Care Overview  Goal: Plan of Care/Patient Progress Review  Occupational Therapy Discharge Summary    Reason for therapy discharge:    Discharged to home with home therapy.    Progress towards therapy goal(s). See goals on Care Plan in [facility] electronic health record for goal details.  Goals partially met.  Barriers to achieving goals:   discharge from facility.    Therapy recommendation(s):    Continued therapy is recommended.  Rationale/Recommendations:  Pt needs further skilled services to address mobility and cognitive deficits and increase safety and IND with ADL..ARU was strongly recommended, but pt declined and d/c home with SO and f/u with  PT, OT, SLP, RN services.  Pt and cargiver educated on 24/7 assist, verbalizing understanding.

## 2018-04-17 NOTE — PLAN OF CARE
Problem: Patient Care Overview  Goal: Plan of Care/Patient Progress Review  Physical Therapy Discharge Summary    Reason for therapy discharge:    Discharged to home with home therapy.    Progress towards therapy goal(s). See goals on Care Plan in [facility] record for goal details.  Goals not met.  Barriers to achieving goals:   discharge from facility.    Therapy recommendation(s):    Continued therapy is recommended.  Rationale/Recommendations:  Inpatient Rehab recommended at discharge, pt declined requesting to return to home with home care servcies. Pt demonstrates skilled need for PT, OT and SLP services at discharge, pt with significant balance impairment, visual impairment, decreased strength and impaired safety awareness. Pt would benefit from additional skilled PT to address impairments and find compensatory strategies to maximize independence and safety..

## 2018-04-17 NOTE — PROGRESS NOTES
Social Work Services Progress Note    Hospital Day: 6  Date of Initial Social Work Evaluation:  Not completed  Collaborated with:  Perla Marquez  NP Neuro Surgery, Pt, Significant other (Devi), 6A RN Care Coordinator (Venessa Yepez), and [employer] Care Coordinator (Adilson DUBOIS  [phone])    Data:  Per Perla Marquez NP, pt is medically ready for discharge.  OT and Physical Therapy are recommending acute rehab placement.  However, pt is declining and instead prefers to discharge directly to home.      Intervention:  Spoke with Perla Marquez.  Met with pt and significant other to discuss therapy recommendations.   Provided education about acute rehab.  Despite efforts, pt would not agree to acute rehab placement and states that he will be discharging directly to home.   Pt does voice a desire for skilled home care visits.  SW spoke with Venessa Yepez, 6A RN CC and she will be arranging for skilled home care, OT, Physical Therapy, RN and Speech Therapy.  FARHAD returned a call (Adilson left a message this am requesting an update in regards to discharge planning) to pt's [employer] Care Coordinator, Adilson ([phone]) and left a message regarding pt's chosen discharge plan.      Assessment: Pt declines recommended ARU stay.  Pt is agreeable to skilled home care visits.      Plan:    Anticipated Disposition:  Return to home with skilled home care as per pt preference.    Barriers to d/c plan:  None identified    Follow Up:  Discharge will be coordinated by the 6A RN Care Coordinator as return to home is anticipated.    CELINE Wesley  Social Work, 6A  Phone:  [phone]  Pager:  [phone]  4/17/2018

## 2018-04-17 NOTE — PLAN OF CARE
Problem: Patient Care Overview  Goal: Plan of Care/Patient Progress Review  Outcome: No Change  Pt POD#5 s/p R crani for meningioma resection. AVSS. Neuro's: A&Ox4, R eye ptosis, R facial droop, R blurred vision, [place] R, ataxic, 5/5 strength. Pt head incision JAD, CDI. Pt c/o headache pain managed with prn tylenol (see MAR). Pt up with SBA. Voiding. Incontinent of loose BM x1. NG in place. Cyclic TF started at 2200 please stop at 0800. , 210; given SS insulin per MAR. Regular diet with nectar thick liquids and calorie counts during day; poor po intake. PIV SL. Pt would like to DC home today with his girlfriend. Continue POC.

## 2018-04-17 NOTE — PROGRESS NOTES
Name:  Charles Santos  Age/Sex: 58M  Rm: [id number]  MRN:  [id number]  Staff: Kamar  Date of Admission: 4/12/2018  Primary Service:  Neurosurgery  Consulting Services: NONE    HPI:  57-year-old gentleman with well-controlled HIV and history of pulmonary emboli for which he is on Xarelto who was discovered several years ago to have a meningioma in the right cerebellopontine angle.  This was discovered because of double vision for which he has been followed by Dr. Jonathan Villeda here at [facility].   Due to location of tumor decision for surgical resection was made.     Procedures:   4/12: Suboccipital Craniotomy for Resection of Right Cerebellar-Pontine Angle Meningioma    Daily events:   4/12: OR sx aborted resection; c/b facial nerve palsy (HB3), lost ABRs; Transferred to  Post-Operatively   4/13: continued intractable nausea; given 1 dose IV phenergan, appeared to help.  Speech evaluated patient and recommends keeping patient NPO.  Feeding tube (gastric) placed.  4/14: DD2 with nectar thickened liquids started. Ambulating with assistance.    4/15: Diet updated to regular diet with nectar thick liquids. Cycled tube feeds.  4/16:  Tube feeds discontinued, improved oral intake    Imaging:  No postoperative images intended.    Examination:  Pre-op: intact     Post-op:  AOx3, PERRL. EOMI. Facial asymmetry (R-sided HB3 palsy). Hearing appears intact bilaterally.   Tongue midline, uvula midline and palate elevated symmetrically.   Motor: 5/5 BUE and BLE. SILT. DTRs 2+ throughout.     Assessment/Plan of care: 58 year old male POD#5 from aborted meningioma resection. 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 Otherwise, recovering well. Monitoring PO intake.     NEURO:   - Post-Operative Pain Control  - No post-op imaging   - Neuro Examination Q 4 HR  - Pathology: Meningioma (WHO Grade I)    CV:  - Goal SBP < 160 mmHg    PULM:   - Incentive Spirometry Q 1 HR     GI/FEN:  - SLP evaluation- regular diet  with nectar thick liquids   - GI Prophylaxis Not Indicated  - Electrolyte Replacement Protocol  - Bowel Regimen w/ Senna and Miralax  - Nutrition consult: severe malnutrition; improved with feeding tube, continuing to monitor with calorie counts    RENAL/:   - Voiding spontaneously    HEME/ID:  - Plts > 100k, hgb > 8, INR < 1.5   - DVT Prophylaxis: SCDs to BLE      ENDO:   - No Issues      DISPO:  Anticipate placement  Barriers: Adequate caloric PO intake, bed availability   Activity: Up in Chair TID; Ambulate w/ Assist  PT: Rehab  OT: TCU    Please contact neurosurgery resident on call with questions.    Dial * * [phone], enter [phone] when prompted.     Gregorio Cook MD, PhD  Neurosurgery PGY-2

## 2018-04-17 NOTE — PROGRESS NOTES
[facility]  Met with pt and family to discuss plans for HC.  Pt to be discharged home 4/17  and has agreed to have [facility] follow with services of SN, SW, PT and OT.  Patient care support center processing referral.  Pt and family verbalized understanding that initial visit is scheduled for 4/18 or 4/19.    Pt has 24 hour phone number for [facility] for any questions or concerns.    Thank you  Sanjuana Godoy RN, BSN  [facility] Liaison  [phone]

## 2018-04-18 ENCOUNTER — CARE COORDINATION (OUTPATIENT)
Dept: CARE COORDINATION | Facility: CLINIC | Age: 58
End: 2018-04-18

## 2018-04-18 NOTE — PLAN OF CARE
Problem: Patient Care Overview  Goal: Plan of Care/Patient Progress Review  Speech Language Therapy Discharge Summary    Reason for therapy discharge:    Discharged to home with home therapy.    Progress towards therapy goal(s). See goals on Care Plan in [facility] electronic health record for goal details.  Goals partially met.  Barriers to achieving goals:   discharge from facility.    Therapy recommendation(s):    Pt may benifit from ongoing ST intervention post discharge pending swallow function   Diet advanced to regular diet with thin liquids prior to discharge.

## 2018-04-25 ENCOUNTER — OFFICE VISIT (OUTPATIENT)
Dept: OPHTHALMOLOGY | Facility: CLINIC | Age: 58
End: 2018-04-25
Payer: MEDICARE

## 2018-04-25 ENCOUNTER — TELEPHONE (OUTPATIENT)
Dept: OPHTHALMOLOGY | Facility: CLINIC | Age: 58
End: 2018-04-25

## 2018-04-25 ENCOUNTER — OFFICE VISIT (OUTPATIENT)
Dept: NEUROSURGERY | Facility: CLINIC | Age: 58
End: 2018-04-25
Payer: MEDICARE

## 2018-04-25 VITALS
DIASTOLIC BLOOD PRESSURE: 77 MMHG | BODY MASS INDEX: 21.19 KG/M2 | WEIGHT: 148 LBS | HEART RATE: 82 BPM | HEIGHT: 70 IN | SYSTOLIC BLOOD PRESSURE: 145 MMHG

## 2018-04-25 DIAGNOSIS — H49.21 6TH NERVE PALSY, RIGHT: Primary | ICD-10-CM

## 2018-04-25 DIAGNOSIS — G51.0 7TH NERVE PALSY: ICD-10-CM

## 2018-04-25 DIAGNOSIS — G93.89 BRAIN MASS: Primary | ICD-10-CM

## 2018-04-25 ASSESSMENT — REFRACTION_WEARINGRX
OD_SPHERE: +1.50
OS_CYLINDER: SPHERE
OS_SPHERE: +1.75
OD_HPRISM: 3 BO
OS_HPRISM: 3 BO
OD_AXIS: 163
OS_ADD: +2.50
OD_ADD: +2.50
OD_CYLINDER: +0.75

## 2018-04-25 ASSESSMENT — CONF VISUAL FIELD
OS_NORMAL: 1
OD_NORMAL: 1

## 2018-04-25 ASSESSMENT — VISUAL ACUITY
OD_CC: 20/25
METHOD: SNELLEN - LINEAR
OD_CC+: -2
CORRECTION_TYPE: GLASSES
OS_CC: 20/20

## 2018-04-25 ASSESSMENT — PAIN SCALES - GENERAL: PAINLEVEL: NO PAIN (0)

## 2018-04-25 ASSESSMENT — EXTERNAL EXAM - RIGHT EYE: OD_EXAM: NORMAL

## 2018-04-25 ASSESSMENT — TONOMETRY
OD_IOP_MMHG: 10
IOP_METHOD: TONOPEN
OS_IOP_MMHG: 15

## 2018-04-25 ASSESSMENT — SLIT LAMP EXAM - LIDS: COMMENTS: NORMAL

## 2018-04-25 ASSESSMENT — EXTERNAL EXAM - LEFT EYE: OS_EXAM: NORMAL

## 2018-04-25 NOTE — LETTER
"4/25/2018       RE: Charles Santos  [address]  [place] [facility] [address]     Dear Colleague,    Thank you for referring your patient, Charles Santos, to the [facility] NEUROSURGERY at [facility]. Please see a copy of my visit note below.    S:  Patient states overall he has been recovering. He states his diplopia has been somewhat improved compared to pre-operatively and continues to wear his prizms. Pain is controlled currently on a regimen of 3 oxycodone per day. His emesis and nausea are much improved and appetite has returned. Patient states he hears aberrant noises out of his right ear and overall hearing is minimal. He has restarted his rivaroxaban. He continues to take his anti-retrovirals.     O:  /77 (BP Location: Right arm, Patient Position: Sitting, Cuff Size: Adult Regular)  Pulse 82  Ht 1.778 m (5' 10\")  Wt 67.1 kg (148 lb)  BMI 21.24 kg/m2    Awake, alert, in no acute distress  Breathing comfortably on room air  Right retromastoid incision appears well-healed and intact without evidence of erythema, fluid collection, or fluid drainage. Nylon suture removed entirely and without difficulty today in clinic.   Extraocular muscles are intact except for abduction of the right eye.   Hearing significantly diminished on the right; not intact to finger rub.   Speech mildly slurred.   Palate elevation symmetric. Tongue protrusion symmetric.   Sternocleidomastoid strength symmetric.   House Brackmann 4 with deficit on the right (inability to activate right brow, right lower facial droop, and incomplete closure of right eyelid).   Diminished sensation on the R face to light touch in all distributions   5/5 strength in the bilateral upper and lower extremities.     A:  Meningioma WHO Grade 1    Status post retromastoid craniectomy and subtotal resection of petrous and cerebellopontine angle meningioma (April 12, 2018) complicated by loss of " "neuromonitoring intra-operatively and unexpected relationship of the tumor to cranial nerve VII/VIII complex leading to termination of tumor resection. Pre-operatively, the patient had  CN VI deficit and continues to have this deficit. Post-operatively, patient has new right-sided CN VII and CN VIII deficits.     We discussed in detail that the patient's hearing is unlikely to significantly change, but that his facial nerve deficits may improve up to 1 year status post surgery. In the meantime, it will be important to protect his right globe given his facial nerve deficit.     P:   - ophthalmology referral for upper eyelid gold weight   - eye patch in interim; wear PRN  - continue with eye ointment  - audiogram before clinic visit in 2-3 weeks   - follow-up in clinic in 2-3 weeks     Patient seen and discussed with Dr. Azeem Galvin.     Kike \"Ho\" MD Gloria, Neurosurgery, PGY-1     I have personally examined the patient, reviewed and edited the resident's note and agree with the plan of care. - Azeem Galvin MD    Service Date: 04/25/2018      Mr. Santos is seen approximately 2 weeks after attempted resection of a petrous meningioma.  A detailed summary of today's visit has been entered into the record by my resident, Ho Castro, which I have reviewed, amended and incorporate into my own.      Mr. Santos has made significant progress in recovering from his operation.  He shows excellent wound healing and the sutures were removed today.      He does have new neurologic deficits following the procedure.  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  He has on examination, the demonstrably right sixth nerve paresis.  This was present preoperatively and on examination the day after surgery appeared to be unchanged from preop.  Today there is a less abduction than there was " either preoperatively or immediately after surgery, but he says that his subjective double vision has not changed significantly.      I explained to Mr. Santos is that what we found at the time of surgery was a particularly difficult positioning of the eighth nerve bundle directly across the midportion of the posterior pole of the tumor that significantly restricted access.  In addition, we found the tumor was quite fibrous and therefore very difficult to remove.  We obtained a biopsy, but in the process of trying to establish sufficient access to the tumor to make a significant reduction in volume, we began to lose signals in the auditory monitoring.  I therefore determined that it would be extremely difficult to remove the tumor without causing significant additional cranial nerve deficits.  We also noted that the facial nerve appeared to be  from the eighth nerve bundle and could be stimulated near its origin on the brainstem.  Stimulation at 0.05 milliamps was present at the termination of the procedure, although the nerve could not be physically seen.      Therefore, I explained to Mr. Santos the reasons for terminating the procedure without a significant reduction in the volume of the tumor.      I have also explained to Mr. Santos that the presence of a good facial nerve stimulation at the end of the case suggests that the facial nerve has a good chance of recovering over time, but that this may take several months.  I have similar expectations of recovery of facial sensation and return of the sixth nerve to its preoperative state.  I told him that there is little likelihood that there would be any improvement in his hearing.      He does complain of some gritty sensation in his cornea and therefore I made arrangements for him to see his neuro-ophthalmologist, Dr. Jonathan Villeda, for immediate therapy and to arrange for placement of a gold weight to protect the eye and allow eyelid closure during the  period of waiting for recovery.      My plan is to see Mr. Satnos again in 2-3 weeks to check on his progress.        D: 2018   T: 2018   MT: AMILCAR      Name:     JESSIE SANTOS   MRN:      [id number]        Account:      [id number]   :      1960           Service Date: 2018      Document: [id number]        Again, thank you for allowing me to participate in the care of your patient.      Sincerely,    Azeem Galvin MD

## 2018-04-25 NOTE — PATIENT INSTRUCTIONS
1.  ASAP Referral: Opthalmology today for Gold Wt Rt eye lid.      Current patient of Dr. Jonathan Villeda, [phone]  2.  Referral for Audiogram in 2-3 weeks with Follow up with Dr. Galvin.  3.  Clear eye patch if unable to get appointment with Opthalmology today.     Thank you

## 2018-04-25 NOTE — PROGRESS NOTES
Assessment & Plan     Charles Santos is a 58 year old male with the following diagnoses:   1. 6th nerve palsy, right    2. 7th nerve palsy        follow up meningioma right pontomedullary junction with right 6th nerve palsy status-post resection on 4/12/18.  This was complicated by a right facial droop immediately after surgery. Overall, it seems to have improved.  His eye is very irritated on the right. The vision is not blurred. He tends to close the right ey during the day. He puts ointment in his RIGHT eye 3x/d.      His visual acuity is 20/25 RIGHT eye, 20/20 LEFT eye.  He has no ocular misalignment in his prism glasses. He has significant lag of the RIGHT eye and corneal dryness.  There is right lower lid laxity.  Increase ointment use 4-6x/d, tape right eye shut at night.  Recommend gold weight evaluation.  Will arrange today. Consider facial PT.      Follow up per plastics.           Attending Physician Attestation:  Complete documentation of historical and exam elements from today's encounter can be found in the full encounter summary report (not reduplicated in this progress note).  I personally obtained the chief complaint(s) and history of present illness.  I confirmed and edited as necessary the review of systems, past medical/surgical history, family history, social history, and examination findings as documented by others; and I examined the patient myself.  I personally reviewed the relevant tests, images, and reports as documented above.  I formulated and edited as necessary the assessment and plan and discussed the findings and management plan with the patient and family. - Jonathan Villeda MD

## 2018-04-25 NOTE — NURSING NOTE
Chief Complaints and History of Present Illnesses   Patient presents with     Consult For     Red and irritated eye     HPI    Affected eye(s):  Right   Symptoms:     No blurred vision   Redness   Foreign body sensation (Comment: shira feeling per pt)   Dryness   Burning         Do you have eye pain now?:  No      Comments:    Patient notes RE redness, irritation, dry.  Uses an AT in the LE qam, and and richard in the RE TID, has been going on since sx on 04/12/18, unable to close RUL.      Mirna Tony April 25, 2018 10:31 AM

## 2018-04-25 NOTE — TELEPHONE ENCOUNTER
Dr. Galvin referring for gold weight evaluation  Pt at [facility] today    Staff at [facility] also received message and scheduled this AM  Donnie Degroot RN 9:53 AM 04/25/18

## 2018-04-25 NOTE — MR AVS SNAPSHOT
After Visit Summary   2018    Charles Santos    MRN: [id number]           Patient Information     Date Of Birth          1960        Visit Information        Provider Department      2018 11:00 AM Jonathan Villeda MD [facility] Ophthalmology        Today's Diagnoses     6th nerve palsy, right    -  1    7th nerve palsy           Follow-ups after your visit        Your next 10 appointments already scheduled     May 01, 2018 10:30 AM CDT   (Arrive by 10:15 AM)   NEW PLASTICS with Radha Guerra MD   [facility] Ophthalmology ([facility])    [address] [place] [id number] [phone]              Who to contact     Please call your clinic at [phone] to:    Ask questions about your health    Make or cancel appointments    Discuss your medicines    Learn about your test results    Speak to your doctor            Additional Information About Your Visit        MyChart Information     Wysiwygt is an electronic gateway that provides easy, online access to your medical records. With Leap[employer]it, you can request a clinic appointment, read your test results, renew a prescription or communicate with your care team.     To sign up for [employer] visit the website at [email or web address]   You will be asked to enter the access code listed below, as well as some personal information. Please follow the directions to create your username and password.     Your access code is: [email or web address]  Expires: 2018  7:30 AM     Your access code will  in 90 days. If you need help or a new code, please contact your [facility] or call [phone] for assistance.        Care EveryWhere ID     This is your Care EveryWhere ID. This could be used by other organizations to access your [place] medical records  [id number]         Blood Pressure from Last 3 Encounters:   18 145/77   18 134/70   18  113/65    Weight from Last 3 Encounters:   04/25/18 67.1 kg (148 lb)   04/12/18 69 kg (152 lb 1.9 oz)   04/02/18 67.8 kg (149 lb 8 oz)              Today, you had the following     No orders found for display       Primary Care Provider Office Phone # Fax #    Chapo Zafar [phone]       [facility] [address] [place] [address]        Equal Access to Services     KAITLYNN AMES : Hadii aad ku hadasho Soomaali, waaxda luqadaha, qaybta kaalmada adeegyada, waxay idiin hayaan adeeg benjamin luna . So [facility] [phone].    ATENCIÓN: Si habla español, tiene a jaime disposición servicios gratuitos de asistencia lingüística. [facility] [phone].    We comply with applicable federal civil rights laws and [place] laws. We do not discriminate on the basis of race, color, national origin, age, disability, sex, sexual orientation, or gender identity.            Thank you!     Thank you for choosing [facility] OPHTHALMOLOGY  for your care. Our goal is always to provide you with excellent care. Hearing back from our patients is one way we can continue to improve our services. Please take a few minutes to complete the written survey that you may receive in the mail after your visit with us. Thank you!             Your Updated Medication List - Protect others around you: Learn how to safely use, store and throw away your medicines at www.disposemymeds.org.          This list is accurate as of 4/25/18 11:00 AM.  Always use your most recent med list.                   Brand Name Dispense Instructions for use Diagnosis    acyclovir 400 MG tablet    ZOVIRAX     Take 800 mg by mouth every evening        albuterol 108 (90 Base) MCG/ACT Inhaler    PROAIR HFA/PROVENTIL HFA/VENTOLIN HFA     Inhale 2 puffs into the lungs every 6 hours as needed        amLODIPine 10 MG tablet    NORVASC     Take 10 mg by mouth every evening        ascorbic acid 1000 MG Tabs    vitamin C     Take 2,000 mg  by mouth every evening Oh hold since 03/10/2018        ATRIPLA 600-200-300 MG per tablet   Generic drug:  efavirenz-emtrictabine-tenofovir      Take 1 tablet by mouth every evening        DOCOSAHEXAENOIC ACID PO      Take 1 capsule by mouth every evening Oh hold since 03/10/2018        esomeprazole 40 MG CR capsule    nexIUM     Take 40 mg by mouth every evening        fluticasone 50 MCG/ACT spray    FLONASE     Spray 2 sprays into both nostrils as needed        MAGNESIUM PO      Take 250 mg by mouth every evening Oh hold since 03/10/2018        metoprolol tartrate 100 MG tablet    LOPRESSOR     Take 100 mg by mouth every evening        oxyCODONE IR 5 MG tablet    ROXICODONE    30 tablet    Take 1-2 tablets (5-10 mg) by mouth every 3 hours as needed for other (pain control or improvement in physical function. Hold dose for analgesic side effects.)    S/P craniotomy       polyethylene glycol Packet    MIRALAX/GLYCOLAX    7 packet    Take 17 g by mouth 3 times daily    S/P craniotomy       polyvinyl alcohol 1.4 % ophthalmic solution    LIQUIFILM TEARS     every morning        potassium chloride SA 20 MEQ CR tablet    K-DUR/KLOR-CON M     Take 40 mEq by mouth every evening Oh hold since 03/10/2018        senna-docusate 8.6-50 MG per tablet    SENOKOT-S;PERICOLACE    60 tablet    Take 3 tablets by mouth 2 times daily    S/P craniotomy       tretinoin 0.05 % cream    RETIN-A     Apply topically once a week        VITAMIN D (CHOLECALCIFEROL) PO      Take 1,000 mg by mouth every evening Oh hold since 03/10/2018        XARELTO 20 MG Tabs tablet   Generic drug:  rivaroxaban ANTICOAGULANT      Take 1 tablet (20 mg) by mouth every evening

## 2018-04-25 NOTE — MR AVS SNAPSHOT
After Visit Summary   4/25/2018    Charles Santos    MRN: [id number]           Patient Information     Date Of Birth          1960        Visit Information        Provider Department      4/25/2018 8:15 AM Azeem Galvin MD [facility] Neurosurgery        Today's Diagnoses     Brain mass    -  1      Care Instructions    1.  ASAP Referral: Opthalmology today for Gold Wt Rt eye lid.  2.  Referral for Audiogram in 2-3 weeks with Follow up with Dr. Galvin.  3.  Clear eye patch if unable to get appointment with Opthalmology today.     Thank you                  Follow-ups after your visit        Additional Services     AUDIOLOGY ADULT REFERRAL       Your provider has referred you to: [facility] [place] [phone]   [email or web address]    Specialty Testing:  Audiogram w/Tymps and Reflexes (Comprehensive Audiology Evaluation)            OPHTHALMOLOGY ADULT REFERRAL       Your provider has referred you to OPHTHALMOLOGY ADULT LOCATIONS     Please be aware that coverage of these services is subject to the terms and limitations of your health insurance plan.  Call member services at your health plan with any benefit or coverage questions.      Please bring the following with you to your appointment:    (1) Any X-Rays, CTs or MRIs which have been performed.  Contact the facility where they were done to arrange for  prior to your scheduled appointment.    (2) List of current medications  (3) This referral request   (4) Any documents/labs given to you for this referral                  Follow-up notes from your care team     Return in about 2 weeks (around 5/9/2018), or 2-3 weeks Follow Up.      Who to contact     Please call your clinic at [phone] to:    Ask questions about your health    Make or cancel appointments    Discuss your medicines    Learn about your test results    Speak to  "your doctor            Additional Information About Your Visit        MyChart Information     Zientiat is an electronic gateway that provides easy, online access to your medical records. With [employer], you can request a clinic appointment, read your test results, renew a prescription or communicate with your care team.     To sign up for [employer] visit the website at [email or web address]   You will be asked to enter the access code listed below, as well as some personal information. Please follow the directions to create your username and password.     Your access code is: [email or web address]  Expires: 2018  7:30 AM     Your access code will  in 90 days. If you need help or a new code, please contact your [facility] or call [phone] for assistance.        Care EveryWhere ID     This is your Care EveryWhere ID. This could be used by other organizations to access your [place] medical records  [id number]        Your Vitals Were     Pulse Height BMI (Body Mass Index)             82 1.778 m (5' 10\") 21.24 kg/m2          Blood Pressure from Last 3 Encounters:   18 145/77   18 134/70   18 113/65    Weight from Last 3 Encounters:   18 67.1 kg (148 lb)   18 69 kg (152 lb 1.9 oz)   18 67.8 kg (149 lb 8 oz)              We Performed the Following     AUDIOLOGY ADULT REFERRAL     OPHTHALMOLOGY ADULT REFERRAL        Primary Care Provider Office Phone # Fax #    Chapo Zafar [phone]       [facility] [address] [place] [address]        Equal Access to Services     DAVID AMES : Hadii aad ku hadasho Soomaali, waaxda luqadaha, qaybta kaalmada vineet ruggiero. So [facility] [phone].    ATENCIÓN: Si habla español, tiene a jaime disposición servicios gratuitos de asistencia lingüística. David al [phone].    We comply with applicable federal civil rights laws " and [place] laws. We do not discriminate on the basis of race, color, national origin, age, disability, sex, sexual orientation, or gender identity.            Thank you!     Thank you for choosing [facility]  for your care. Our goal is always to provide you with excellent care. Hearing back from our patients is one way we can continue to improve our services. Please take a few minutes to complete the written survey that you may receive in the mail after your visit with us. Thank you!             Your Updated Medication List - Protect others around you: Learn how to safely use, store and throw away your medicines at www.disposemymeds.org.          This list is accurate as of 4/25/18  9:16 AM.  Always use your most recent med list.                   Brand Name Dispense Instructions for use Diagnosis    acyclovir 400 MG tablet    ZOVIRAX     Take 800 mg by mouth every evening        albuterol 108 (90 Base) MCG/ACT Inhaler    PROAIR HFA/PROVENTIL HFA/VENTOLIN HFA     Inhale 2 puffs into the lungs every 6 hours as needed        amLODIPine 10 MG tablet    NORVASC     Take 10 mg by mouth every evening        ascorbic acid 1000 MG Tabs    vitamin C     Take 2,000 mg by mouth every evening Oh hold since 03/10/2018        ATRIPLA 600-200-300 MG per tablet   Generic drug:  efavirenz-emtrictabine-tenofovir      Take 1 tablet by mouth every evening        DOCOSAHEXAENOIC ACID PO      Take 1 capsule by mouth every evening Oh hold since 03/10/2018        esomeprazole 40 MG CR capsule    nexIUM     Take 40 mg by mouth every evening        fluticasone 50 MCG/ACT spray    FLONASE     Spray 2 sprays into both nostrils as needed        MAGNESIUM PO      Take 250 mg by mouth every evening Oh hold since 03/10/2018        metoprolol tartrate 100 MG tablet    LOPRESSOR     Take 100 mg by mouth every evening        oxyCODONE IR 5 MG tablet    ROXICODONE    30 tablet    Take 1-2 tablets (5-10 mg) by mouth every 3 hours as  needed for other (pain control or improvement in physical function. Hold dose for analgesic side effects.)    S/P craniotomy       polyethylene glycol Packet    MIRALAX/GLYCOLAX    7 packet    Take 17 g by mouth 3 times daily    S/P craniotomy       polyvinyl alcohol 1.4 % ophthalmic solution    LIQUIFILM TEARS     every morning        potassium chloride SA 20 MEQ CR tablet    K-DUR/KLOR-CON M     Take 40 mEq by mouth every evening Oh hold since 03/10/2018        senna-docusate 8.6-50 MG per tablet    SENOKOT-S;PERICOLACE    60 tablet    Take 3 tablets by mouth 2 times daily    S/P craniotomy       tretinoin 0.05 % cream    RETIN-A     Apply topically once a week        VITAMIN D (CHOLECALCIFEROL) PO      Take 1,000 mg by mouth every evening Oh hold since 03/10/2018        XARELTO 20 MG Tabs tablet   Generic drug:  rivaroxaban ANTICOAGULANT      Take 1 tablet (20 mg) by mouth every evening

## 2018-04-25 NOTE — PROGRESS NOTES
"S:  Patient states overall he has been recovering. He states his diplopia has been somewhat improved compared to pre-operatively and continues to wear his prizms. Pain is controlled currently on a regimen of 3 oxycodone per day. His emesis and nausea are much improved and appetite has returned. Patient states he hears aberrant noises out of his right ear and overall hearing is minimal. He has restarted his rivaroxaban. He continues to take his anti-retrovirals.     O:  /77 (BP Location: Right arm, Patient Position: Sitting, Cuff Size: Adult Regular)  Pulse 82  Ht 1.778 m (5' 10\")  Wt 67.1 kg (148 lb)  BMI 21.24 kg/m2    Awake, alert, in no acute distress  Breathing comfortably on room air  Right retromastoid incision appears well-healed and intact without evidence of erythema, fluid collection, or fluid drainage. Nylon suture removed entirely and without difficulty today in clinic.   Extraocular muscles are intact except for abduction of the right eye.   Hearing significantly diminished on the right; not intact to finger rub.   Speech mildly slurred.   Palate elevation symmetric. Tongue protrusion symmetric.   Sternocleidomastoid strength symmetric.   House Brackmann 4 with deficit on the right (inability to activate right brow, right lower facial droop, and incomplete closure of right eyelid).   Diminished sensation on the R face to light touch in all distributions   5/5 strength in the bilateral upper and lower extremities.     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 Pre-operatively, the patient had  CN VI deficit and continues to have this deficit. Post-operatively, patient has new right-sided CN VII and CN VIII " "deficits.     We discussed in detail that the patient's hearing is unlikely to significantly change, but that his facial nerve deficits may improve up to 1 year status post surgery. In the meantime, it will be important to protect his right globe given his facial nerve deficit.     P:   - ophthalmology referral for upper eyelid gold weight   - eye patch in interim; wear PRN  - continue with eye ointment  - audiogram before clinic visit in 2-3 weeks   - follow-up in clinic in 2-3 weeks     Patient seen and discussed with Dr. Azeem Galvin.     Stokes \"Ho\" MD [place], Neurosurgery, PGY-1     I have personally examined the patient, reviewed and edited the resident's note and agree with the plan of care. - Azeem Galvin MD  "

## 2018-04-26 NOTE — PROGRESS NOTES
Service Date: 04/25/2018      Mr. Santos is seen approximately 2 weeks after attempted resection of a petrous meningioma.  A detailed summary of today's visit has been entered into the record by my resident, Ho Castro, which I have reviewed, amended and incorporate into my own.      Mr. Santos has made significant progress in recovering from his operation.  He shows excellent wound healing and the sutures were removed today.      He does have new neurologic deficits following the procedure.  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  He has on examination, the demonstrably right sixth nerve paresis.  This was present preoperatively and on examination the day after surgery appeared to be unchanged from preop.  Today there is a less abduction than there was either preoperatively or immediately after surgery, but he says that his subjective double vision has not changed significantly.      I explained to Mr. Santos is that what we found at the time of surgery was a particularly difficult positioning of the eighth nerve bundle directly across the midportion of the posterior pole of the tumor that significantly restricted access.  In addition, we found the tumor was quite fibrous and therefore very difficult to remove.  We obtained a biopsy, but in the process of trying to establish sufficient access to the tumor to make a significant reduction in volume, we began to lose signals in the auditory monitoring.  I therefore determined that it would be extremely difficult to remove the tumor without causing significant additional cranial nerve deficits.  We also noted that the facial nerve appeared to be  from the eighth nerve bundle and could be stimulated near its origin on the brainstem.  Stimulation at 0.05 milliamps was present at the termination of the procedure,  although the nerve could not be physically seen.      Therefore, I explained to Mr. Oswald the reasons for terminating the procedure without a significant reduction in the volume of the tumor.      I have also explained to Mr. Oswald that the presence of a good facial nerve stimulation at the end of the case suggests that the facial nerve has a good chance of recovering over time, but that this may take several months.  I have similar expectations of recovery of facial sensation and return of the sixth nerve to its preoperative state.  I told him that there is little likelihood that there would be any improvement in his hearing.      He does complain of some gritty sensation in his cornea and therefore I made arrangements for him to see his neuro-ophthalmologist, Dr. Jonathan Villeda, for immediate therapy and to arrange for placement of a gold weight to protect the eye and allow eyelid closure during the period of waiting for recovery.      My plan is to see Mr. Oswald again in 2-3 weeks to check on his progress.         ALLY TURNER MD             D: 2018   T: 2018   MT: AMILCAR      Name:     JESSIE OSWALD   MRN:      [id number]        Account:      [id number]   :      1960           Service Date: 2018      Document: [id number]

## 2018-05-03 ENCOUNTER — TELEPHONE (OUTPATIENT)
Dept: OPHTHALMOLOGY | Facility: CLINIC | Age: 58
End: 2018-05-03

## 2018-05-03 NOTE — TELEPHONE ENCOUNTER
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 Guerra's RN for review  Donnie Degroot RN 3:32 PM 05/03/18

## 2018-05-09 ENCOUNTER — OFFICE VISIT (OUTPATIENT)
Dept: NEUROSURGERY | Facility: CLINIC | Age: 58
End: 2018-05-09
Payer: MEDICARE

## 2018-05-09 VITALS
BODY MASS INDEX: 21.27 KG/M2 | HEIGHT: 70 IN | HEART RATE: 65 BPM | SYSTOLIC BLOOD PRESSURE: 99 MMHG | WEIGHT: 148.6 LBS | DIASTOLIC BLOOD PRESSURE: 60 MMHG

## 2018-05-09 DIAGNOSIS — G93.89 BRAIN MASS: Primary | ICD-10-CM

## 2018-05-09 RX ORDER — ETHYL ALCOHOL 700 MG/ML
GEL TOPICAL 4 TIMES DAILY
COMMUNITY
Start: 2018-04-19

## 2018-05-09 ASSESSMENT — PAIN SCALES - GENERAL: PAINLEVEL: NO PAIN (0)

## 2018-05-09 NOTE — PATIENT INSTRUCTIONS
1.  Follow up with Dr. Galvin 2 months post procedure with an Audiogram prior to appointment  3.  Call [facility] Coordinator  with questions/concerns.    Thank you.

## 2018-05-09 NOTE — MR AVS SNAPSHOT
After Visit Summary   2018    Charles Santos    MRN: [id number]           Patient Information     Date Of Birth          1960        Visit Information        Provider Department      2018 11:00 AM Azeem Galvin MD [facility] Neurosurgery        Today's Diagnoses     Brain mass    -  1      Care Instructions    1.  Follow up with Dr. Galvin 2 months post procedure with an Audiogram prior to appointment  3.  Call [facility] Coordinator  with questions/concerns.    Thank you.              Follow-ups after your visit        Additional Services     AUDIOLOGY ADULT REFERRAL       Your provider [facility]: [facility] and [facility] - [place] [phone]   [email or web address]    Specialty Testing:  Audiogram w/Tymps and Reflexes (Comprehensive Audiology Evaluation)                  Who to contact     Please call your clinic at [phone] to:    Ask questions about your health    Make or cancel appointments    Discuss your medicines    Learn about your test results    Speak to your doctor            Additional Information About Your Visit        Kinetic Information     Kinetic is an electronic gateway that provides easy, online access to your medical records. With Kinetic, you can request a clinic appointment, read your test results, renew a prescription or communicate with your care team.     To sign up for Kinetic visit the website at [email or web address]   You will be asked to enter the access code listed below, as well as some personal information. Please follow the directions to create your username and password.     Your access code is: [email or web address]  Expires: 2018  7:30 AM     Your access code will  in 90 days. If you need help or a new code, please contact your [facility] or call [phone] for assistance.        Care EveryWhere  "ID     This is your Care EveryWhere ID. This could be used by other organizations to access your [place] medical records  [id number]        Your Vitals Were     Pulse Height BMI (Body Mass Index)             65 1.778 m (5' 10\") 21.32 kg/m2          Blood Pressure from Last 3 Encounters:   05/09/18 99/60   04/25/18 145/77   04/17/18 134/70    Weight from Last 3 Encounters:   05/09/18 67.4 kg (148 lb 9.6 oz)   04/25/18 67.1 kg (148 lb)   04/12/18 69 kg (152 lb 1.9 oz)              We Performed the Following     AUDIOLOGY ADULT REFERRAL        Primary Care Provider Office Phone # Fax #    Chapo Zafar [phone]       [facility] [address] [place] [address]        Equal Access to Services     [facility]: Hadii aad ku hadasho Soomaali, waaxda luqadaha, qaybta kaalmada adeegyada, waxay idiin hayaan kevin mckeonarakari luna . So [facility] [phone].    ATENCIÓN: Si habla español, tiene a jaime disposición servicios gratuitos de asistencia lingüística. David al [phone].    We comply with applicable federal civil rights laws and [place] laws. We do not discriminate on the basis of race, color, national origin, age, disability, sex, sexual orientation, or gender identity.            Thank you!     Thank you for choosing [facility]  for your care. Our goal is always to provide you with excellent care. Hearing back from our patients is one way we can continue to improve our services. Please take a few minutes to complete the written survey that you may receive in the mail after your visit with us. Thank you!             Your Updated Medication List - Protect others around you: Learn how to safely use, store and throw away your medicines at www.disposemymeds.org.          This list is accurate as of 5/9/18 12:50 PM.  Always use your most recent med list.                   Brand Name Dispense Instructions for use Diagnosis    acyclovir 400 MG tablet    " ZOVIRAX     Take 800 mg by mouth every evening        albuterol 108 (90 Base) MCG/ACT Inhaler    PROAIR HFA/PROVENTIL HFA/VENTOLIN HFA     Inhale 2 puffs into the lungs every 6 hours as needed        amLODIPine 10 MG tablet    NORVASC     Take 10 mg by mouth every evening        ascorbic acid 1000 MG Tabs    vitamin C     Take 2,000 mg by mouth every evening Oh hold since 03/10/2018        ATRIPLA 600-200-300 MG per tablet   Generic drug:  efavirenz-emtrictabine-tenofovir      Take 1 tablet by mouth every evening        DOCOSAHEXAENOIC ACID PO      Take 1 capsule by mouth every evening Oh hold since 03/10/2018        dolutegravir 50 MG tablet    TIVICAY     Take 50 mg by mouth daily        emtricitabine-tenofovir -25 MG per tablet    DESCOVY     Take by mouth daily        esomeprazole 40 MG CR capsule    nexIUM     Take 40 mg by mouth every evening        EYE LUBRICANT Oint      4 times daily        fluticasone 50 MCG/ACT spray    FLONASE     Spray 2 sprays into both nostrils as needed        MAGNESIUM PO      Take 250 mg by mouth every evening Oh hold since 03/10/2018        metoprolol tartrate 100 MG tablet    LOPRESSOR     Take 100 mg by mouth every evening        oxyCODONE IR 5 MG tablet    ROXICODONE    30 tablet    Take 1-2 tablets (5-10 mg) by mouth every 3 hours as needed for other (pain control or improvement in physical function. Hold dose for analgesic side effects.)    S/P craniotomy       polyethylene glycol Packet    MIRALAX/GLYCOLAX    7 packet    Take 17 g by mouth 3 times daily    S/P craniotomy       polyvinyl alcohol 1.4 % ophthalmic solution    LIQUIFILM TEARS     every morning        potassium chloride SA 20 MEQ CR tablet    K-DUR/KLOR-CON M     Take 40 mEq by mouth every evening Oh hold since 03/10/2018        senna-docusate 8.6-50 MG per tablet    SENOKOT-S;PERICOLACE    60 tablet    Take 3 tablets by mouth 2 times daily    S/P craniotomy       tretinoin 0.05 % cream    RETIN-A     Apply  topically once a week        VITAMIN D (CHOLECALCIFEROL) PO      Take 1,000 mg by mouth every evening Oh hold since 03/10/2018        XARELTO 20 MG Tabs tablet   Generic drug:  rivaroxaban ANTICOAGULANT      Take 1 tablet (20 mg) by mouth every evening

## 2018-05-09 NOTE — LETTER
2018       RE: Charles Santos  [address]  [place] [address]     Dear Colleague,    Thank you for referring your patient, Charles Santos, to the [facility] NEUROSURGERY at [facility]. Please see a copy of my visit note below.    Service Date: 2018      HISTORY OF PRESENT ILLNESS:  Mr. Santos is seen now about 4 weeks following a biopsy of a meningioma in the left anterior petrous region.      He is feeling stronger and better.  He is having less discomfort.  He thinks that his face is beginning to move better.      He is protecting his eye with taping and ointment at night and ointment or drops during the day.      PHYSICAL EXAMINATION:  On examination, he indeed has significantly improved tone in the right face.  He has the beginnings of recovering his nasolabial fold.  His mouth is nearly symmetric at rest.  He is able to close his eye within about 3 mm, which is a significant improvement from 2 weeks ago. HB-4     He complains of some chirping sounds in his ear but believes he has some hearing.  He has seen the ophthalmologist and at present the gold weight is on hold.       ASSESSMENT:  Overall, I am pleased that Mr. Santos seems to be showing significant improvement in facial function.  We will plan to have him back at about 2 months postop to recheck on his facial nerve progress and to get an audiogram.  We will plan a followup MRI scan at about 3 months postop.      D: 2018   T: 05/10/2018   MT: DARRELL      Name:     CHARLES SANTOS   MRN:      [id number]        Account:      [id number]   :      1960           Service Date: 2018      Document: [id number]        Again, thank you for allowing me to participate in the care of your patient.      Sincerely,    Azeem Galvin MD

## 2018-05-10 NOTE — PROGRESS NOTES
Service Date: 2018      HISTORY OF PRESENT ILLNESS:  Mr. Oswald is seen now about 4 weeks following a biopsy of a meningioma in the left anterior petrous region.      He is feeling stronger and better.  He is having less discomfort.  He thinks that his face is beginning to move better.      He is protecting his eye with taping and ointment at night and ointment or drops during the day.      PHYSICAL EXAMINATION:  On examination, he indeed has significantly improved tone in the right face.  He has the beginnings of recovering his nasolabial fold.  His mouth is nearly symmetric at rest.  He is able to close his eye within about 3 mm, which is a significant improvement from 2 weeks ago. HB-4     He complains of some chirping sounds in his ear but believes he has some hearing.  He has seen the ophthalmologist and at present the gold weight is on hold.       ASSESSMENT:  Overall, I am pleased that Mr. Oswald seems to be showing significant improvement in facial function.  We will plan to have him back at about 2 months postop to recheck on his facial nerve progress and to get an audiogram.  We will plan a followup MRI scan at about 3 months postop.         ALLY TURNER MD             D: 2018   T: 05/10/2018   MT: DARRELL      Name:     JESSIE OSWALD   MRN:      [id number]        Account:      [id number]   :      1960           Service Date: 2018      Document: [id number]

## 2018-05-18 ENCOUNTER — TELEPHONE (OUTPATIENT)
Dept: NEUROSURGERY | Facility: CLINIC | Age: 58
End: 2018-05-18

## 2018-05-18 DIAGNOSIS — M79.10 MUSCLE ACHE: Primary | ICD-10-CM

## 2018-05-18 RX ORDER — CYCLOBENZAPRINE HCL 5 MG
5 TABLET ORAL 3 TIMES DAILY PRN
Qty: 45 TABLET | Refills: 0 | Status: SHIPPED | OUTPATIENT
Start: 2018-05-18 | End: 2021-10-07

## 2018-05-18 NOTE — TELEPHONE ENCOUNTER
"DOS 4/12/18:  Rt retromastoid craniectomy and subtotal resection of petrous and CP angle meningioma.  LOV 5/9/18 Kamar:  Pt was making progress in recovering from procedure.  He was having less discomfort.    For the past 2-3 days having discomfort on Rt side of head near incision radiating to neck.  States \"eating Tylenol like candy\".  States used up all oxycodone and asking for more pain medication.  States pain is fairly constant and #7-8.  Difficult to sleep.  States pain is new and wondering what he can do for the discomfort.    Will discuss with provider and get back to pt.  Denies fever, incision states looking good, no drainage, lumps or new outward incision issues. Vision the same, using ointment and tape covering eye at night.               "

## 2018-05-18 NOTE — TELEPHONE ENCOUNTER
GINO Selby spoke with patient, will try Flexeril for discomfort and callback in 3-4 days with update.  Voices understanding.

## 2018-05-22 ENCOUNTER — TELEPHONE (OUTPATIENT)
Dept: NEUROSURGERY | Facility: CLINIC | Age: 58
End: 2018-05-22

## 2018-05-22 NOTE — TELEPHONE ENCOUNTER
Adilson, Care Coordinator calling for patient.  Patient recevied a Jury Summons in the mail.  Adilson would like a letter faxed to her explaining patient can not complete Jury Duty at this time due to medical reasons.    Fax to .  Letter sent via Communications.

## 2018-05-22 NOTE — LETTER
Charles Santos  [address]  [place] [address]      May 22, 2018      To whom it may concern,    Charles Santos is currently under my medical care for NeuroSurgery. He is unsuitable for jury duty at this time due to Craniectomy Procedure performed on 4/12/18.  Please excuse from jury duty until 6/2019.      Sincerely,    Azeem Galvin MD, FACS  Professor of Neurosurgery, ENT and Pediatrics

## 2018-07-09 ENCOUNTER — TELEPHONE (OUTPATIENT)
Dept: NEUROSURGERY | Facility: CLINIC | Age: 58
End: 2018-07-09

## 2018-07-09 NOTE — TELEPHONE ENCOUNTER
[facility] calling asking to refill Flexeril for patient.  Call to patient, states does not need more Flexeril, not taking at this time and does not need any refill.    [facility] to [facility]   Left message Flexeril refill denied, patient does not need or use at this time.      Callback for questions/concerns.

## 2018-07-11 ENCOUNTER — OFFICE VISIT (OUTPATIENT)
Dept: NEUROSURGERY | Facility: CLINIC | Age: 58
End: 2018-07-11
Payer: MEDICARE

## 2018-07-11 ENCOUNTER — OFFICE VISIT (OUTPATIENT)
Dept: AUDIOLOGY | Facility: CLINIC | Age: 58
End: 2018-07-11
Payer: MEDICARE

## 2018-07-11 VITALS
SYSTOLIC BLOOD PRESSURE: 109 MMHG | DIASTOLIC BLOOD PRESSURE: 58 MMHG | WEIGHT: 152.4 LBS | BODY MASS INDEX: 21.82 KG/M2 | HEIGHT: 70 IN | HEART RATE: 67 BPM

## 2018-07-11 DIAGNOSIS — H90.3 SENSORINEURAL HEARING LOSS, BILATERAL: Primary | ICD-10-CM

## 2018-07-11 DIAGNOSIS — G93.89 BRAIN MASS: Primary | ICD-10-CM

## 2018-07-11 ASSESSMENT — PAIN SCALES - GENERAL: PAINLEVEL: NO PAIN (0)

## 2018-07-11 NOTE — LETTER
7/11/2018       RE: Charles Santos  [address]  [place] [address]     Dear Colleague,    Thank you for referring your patient, Charles Santos, to the [facility] NEUROSURGERY at [facility]. Please see a copy of my visit note below.    Service Date: 07/11/2018      Mr. Santos was seen today to assess his continuing progress following a difficult operation in the right cerebellopontine angle.      Today, he tells me that he is distressed by his persistent facial weakness, having sensations of grittiness or sand in his right eye, having episodes of depression and concerned about his loss of hearing in the right ear and some intermittent imbalance.      I examined him.  His face is nearly symmetric at rest, but there is generalized weakness of the right side of the face.  He does not completely close the right eyelid.  He does, however, have motion in all divisions of the facial nerve, returning nasolabial fold.      I looked at his eye and he does not have any conjunctival erythema.  He denies blurring of vision.        He had an audiogram today and reviewed that with him.  In his left ear, the non-operated side, he has 25 dB speech reception threshold with 96% word recognition.  He has lost some high tones above 2000 Hz.  On the right side which is the operated ear, his speech reception threshold was 45 dB with 8% word recognition.      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      RECOMMENDATIONS:     1.  Abnormal sensation in the right eye.  I advised him to see his ophthalmologist today.  He said that this would not be possible, but allowed us to make an appointment for him to see his ophthalmologist tomorrow.  I advised him to increase the drops and ointment utilization for the rest of the day until he sees the ophthalmologist tomorrow.    2.  Facial paresis.  I again advised him that he has function in all parts of the facial nerve and that we have every reason to believe that it will gradually improve to much closer to normal function over time.  He understands that this will take many months and up to a year or more.  I reemphasized to him the importance of eye care and protection.   3.  Episodic depression.  We talked about this and that it is perfectly understandable for him to have episodes of depression because he has had a complicated postoperative course.  I offered to arrange an appointment with our health psychologist and he refused that offer.   4.  Loss of useful hearing in the right ear.  We went over the audiogram and I told him that it was very unlikely that the hearing in that ear would improve significantly given the findings on the audiogram.  We talked a bit about the tinnitus associated with it and how difficult it is to reliably treat that.  He says he has some benefit from an earplug.  We also talked about the possibility of a CROS hearing aid that would bring sound from the right to the left.  I also suggested that he might get some help by an aid in his right ear where he has presbyacusis.  He is going to give this matter some consideration.      I offered him vestibular therapy, which he refused.      We are going to have him come back in 2 months to check further on the progress of his face and to have his postoperative MRI scan.  At the end of the visit I again reiterated the offers for Psychology, vestibular therapy, and hearing aid consultations, which he does not want to pursue at this time.         ALLY TURNER MD             D: 2018   T: 2018   MT: AMILCAR      Name:     JESSIE OSWALD   MRN:      [id number]        Account:      [id number]   :      1960           Service Date: 2018      Document: [id number]

## 2018-07-11 NOTE — MR AVS SNAPSHOT
After Visit Summary   2018    Charles Santos    MRN: [id number]           Patient Information     Date Of Birth          1960        Visit Information        Provider Department      2018 8:30 AM Rain Hernandez, [facility] Audiology        Today's Diagnoses     Sensorineural hearing loss, bilateral    -  1       Follow-ups after your visit        Future tests that were ordered for you today     Open Future Orders        Priority Expected Expires Ordered    MRI Brain w & w/o contrast Routine  2019            Who to contact     Please call your clinic at [phone] to:    Ask questions about your health    Make or cancel appointments    Discuss your medicines    Learn about your test results    Speak to your doctor            Additional Information About Your Visit        MyChart Information     Giftango is an electronic gateway that provides easy, online access to your medical records. With [employer], you can request a clinic appointment, read your test results, renew a prescription or communicate with your care team.     To sign up for Giftango visit the website at [email or web address]   You will be asked to enter the access code listed below, as well as some personal information. Please follow the directions to create your username and password.     Your access code is: [email or web address]  Expires: 10/9/2018 10:39 AM     Your access code will  in 90 days. If you need help or a new code, please contact your [facility] or call [phone] for assistance.        Care EveryWhere ID     This is your Care EveryWhere ID. This could be used by other organizations to access your [place] medical records  [id number]         Blood Pressure from Last 3 Encounters:   18 109/58   18 99/60   18 145/77    Weight from Last 3 Encounters:   18 69.1 kg (152 lb 6.4 oz)   18 67.4 kg (148 lb 9.6 oz)   18 67.1 kg (148  padma)              We Performed the Following     AUDIOGRAM/TYMPANOGRAM - INTERFACE     [facility] Audiometry Thrshld Eval & Speech Recog ([address])     Tymps / Reflex   ([id number])        Primary Care Provider Office Phone # Fax #    Chapo Saleem Zafar [phone]       [facility] [address] [place] [address]        Equal Access to Services     [facility] : Hadii aad ku hadasho Soomaali, waaxda luqadaha, qaybta kaalmada adeegyada, waxay idiin hayaan adeeg kharash la'aan ah. So [facility] [phone].    ATENCIÓN: Si habla espmio, tiene a jaime disposición servicios gratuitos de asistencia lingüística. Llame al [phone].    We comply with applicable federal civil rights laws and [place] laws. We do not discriminate on the basis of race, color, national origin, age, disability, sex, sexual orientation, or gender identity.            Thank you!     Thank you for choosing [facility] AUDIOLOGY  for your care. Our goal is always to provide you with excellent care. Hearing back from our patients is one way we can continue to improve our services. Please take a few minutes to complete the written survey that you may receive in the mail after your visit with us. Thank you!             Your Updated Medication List - Protect others around you: Learn how to safely use, store and throw away your medicines at www.disposemymeds.org.          This list is accurate as of 7/11/18 11:42 AM.  Always use your most recent med list.                   Brand Name Dispense Instructions for use Diagnosis    acyclovir 400 MG tablet    ZOVIRAX     Take 800 mg by mouth every evening        albuterol 108 (90 Base) MCG/ACT Inhaler    PROAIR HFA/PROVENTIL HFA/VENTOLIN HFA     Inhale 2 puffs into the lungs every 6 hours as needed        amLODIPine 10 MG tablet    NORVASC     Take 10 mg by mouth every evening        ascorbic acid 1000 MG Tabs    vitamin C     Take 2,000 mg by mouth every evening Oh hold since  03/10/2018        ATRIPLA 600-200-300 MG per tablet   Generic drug:  efavirenz-emtrictabine-tenofovir      Take 1 tablet by mouth every evening        cyclobenzaprine 5 MG tablet    FLEXERIL    45 tablet    Take 1 tablet (5 mg) by mouth 3 times daily as needed for muscle spasms    Muscle ache       DOCOSAHEXAENOIC ACID PO      Take 1 capsule by mouth every evening Oh hold since 03/10/2018        esomeprazole 40 MG CR capsule    nexIUM     Take 40 mg by mouth every evening        EYE LUBRICANT Oint      4 times daily        fluticasone 50 MCG/ACT spray    FLONASE     Spray 2 sprays into both nostrils as needed        MAGNESIUM PO      Take 250 mg by mouth every evening Oh hold since 03/10/2018        metoprolol tartrate 100 MG tablet    LOPRESSOR     Take 100 mg by mouth every evening        oxyCODONE IR 5 MG tablet    ROXICODONE    30 tablet    Take 1-2 tablets (5-10 mg) by mouth every 3 hours as needed for other (pain control or improvement in physical function. Hold dose for analgesic side effects.)    S/P craniotomy       polyethylene glycol Packet    MIRALAX/GLYCOLAX    7 packet    Take 17 g by mouth 3 times daily    S/P craniotomy       polyvinyl alcohol 1.4 % ophthalmic solution    LIQUIFILM TEARS     every morning        potassium chloride SA 20 MEQ CR tablet    K-DUR/KLOR-CON M     Take 40 mEq by mouth every evening Oh hold since 03/10/2018        senna-docusate 8.6-50 MG per tablet    SENOKOT-S;PERICOLACE    60 tablet    Take 3 tablets by mouth 2 times daily    S/P craniotomy       tretinoin 0.05 % cream    RETIN-A     Apply topically once a week        VITAMIN D (CHOLECALCIFEROL) PO      Take 1,000 mg by mouth every evening Oh hold since 03/10/2018        XARELTO 20 MG Tabs tablet   Generic drug:  rivaroxaban ANTICOAGULANT      Take 1 tablet (20 mg) by mouth every evening

## 2018-07-11 NOTE — PATIENT INSTRUCTIONS
1.  MRI Samm with and without contrast with 2 month F/U with Dr. Galvin.  2. Refer to Opthalmology for scratchy right eye.  3. Call Kareen RN Clinical Coordinator for questions concerns  [phone].    Thank you for using [employer] for your healthcare needs.

## 2018-07-11 NOTE — PROGRESS NOTES
AUDIOLOGY REPORT    SUBJECTIVE:  Charles Santos is a 58 year old male who was seen in Audiology at the [facility] [place] for audiologic evaluation, referred by Azeem Galvin. The patient has been seen previously in this clinic on 4/2/18 for assessment and results indicated normal to severe sensorineural hearing loss bilaterally. The patient underwent surgery for right CPA tumor on 4/12/18. The patient reports that he had a complete loss of hearing following surgery. He also reported right tinnitus, facial weakness, headaches and imbalance.     OBJECTIVE:  Note: patient was very upset throughout testing. He reported that he wanted to natalie the hospital because of all of the unnecessary tests and due to displeasure with the outcomes of his procedure. I explained that he it is his decision whether or not to do today's hearing test and offered to let him talk to my director. He threatened to leave multiple times and called me names but decided that he did ultimately want to complete the hearing test today.    Otoscopic exam indicates ears are clear of cerumen bilaterally     Pure Tone Thresholds assessed using conventional audiometry with fair  reliability from 250-8000 Hz bilaterally using insert earphones and circumaural headphones     RIGHT:  Normal to severe sensorineural hearing loss    LEFT:    Mild to profound sensorineural hearing loss    Tympanogram:    RIGHT: restricted eardrum mobility     LEFT:   Abnormal mobility, likely positive pressure    Reflexes (reported by stimulus ear):  Could not maintain seal    Speech Reception Threshold:    RIGHT: 45 dB HL: Note patient initally was not responding to familiarized spondees at any presentation level. When reinstructed, he was able to respond at level that was in agree    LEFT:   25 dB HL  Word Recognition Score:     RIGHT: 8% at 85 dB HL using NU-6 recorded word list. Note patient could not tolerate louder  presentation level    LEFT:   96% at 70 dB HL using NU-6 recorded word list.      ASSESSMENT:     Compared to patient's previous audiogram dated 4/2/18, hearing has decreased by 15-35 dB from 250- 4 kHz in the right ear. Today s results were discussed with the patient in detail.     PLAN:  Patient left appointment before counseling of results could be completed. It is recommended that the patient follow up as recommended by physician.  Please call this clinic with questions regarding these results or recommendations.        Terry Toure, [facility]  Licensed Audiologist  [place] License #[id number]

## 2018-07-11 NOTE — MR AVS SNAPSHOT
After Visit Summary   7/11/2018    Charles Santos    MRN: [id number]           Patient Information     Date Of Birth          1960        Visit Information        Provider Department      7/11/2018 9:30 AM Azeem Galvin MD [facility] Neurosurgery        Today's Diagnoses     Brain mass    -  1      Care Instructions    1.  MRI Samm with and without contrast with 2 month F/U with Dr. Galvin.  2. Refer to Opthalmology for scratchy right eye.  3. Call Kareen MITCHELL Clinical Coordinator for questions concerns  [phone].    Thank you for using  [employer] for your healthcare needs.                 Follow-ups after your visit        Additional Services     OPHTHALMOLOGY ADULT REFERRAL       Your provider has referred you to: [facility]: [facility] [phone]   [email or web address]    Please be aware that coverage of these services is subject to the terms and limitations of your health insurance plan.  Call member services at your health plan with any benefit or coverage questions.      Please bring the following with you to your appointment:    (1) Any X-Rays, CTs or MRIs which have been performed.  Contact the facility where they were done to arrange for  prior to your scheduled appointment.    (2) List of current medications  (3) This referral request   (4) Any documents/labs given to you for this referral                  Follow-up notes from your care team     Return in about 2 months (around 9/11/2018).      Future tests that were ordered for you today     Open Future Orders        Priority Expected Expires Ordered    MRI Brain w & w/o contrast Routine  7/11/2019 7/11/2018            Who to contact     Please call your clinic at [phone] to:    Ask questions about your health    Make or cancel appointments    Discuss your medicines    Learn about your test results    Speak to your doctor            Additional Information About Your Visit       "  MyChart Information     [employer] is an electronic gateway that provides easy, online access to your medical records. With [employer], you can request a clinic appointment, read your test results, renew a prescription or communicate with your care team.     To sign up for [employer] visit the website at [email or web address]   You will be asked to enter the access code listed below, as well as some personal information. Please follow the directions to create your username and password.     Your access code is: [email or web address]  Expires: 10/9/2018 10:39 AM     Your access code will  in 90 days. If you need help or a new code, please contact your [facility] or call [phone] for assistance.        Care EveryWhere ID     This is your Care EveryWhere ID. This could be used by other organizations to access your [facility] medical records  [id number]        Your Vitals Were     Pulse Height BMI (Body Mass Index)             67 1.778 m (5' 10\") 21.87 kg/m2          Blood Pressure from Last 3 Encounters:   18 109/58   18 99/60   18 145/77    Weight from Last 3 Encounters:   18 69.1 kg (152 lb 6.4 oz)   18 67.4 kg (148 lb 9.6 oz)   18 67.1 kg (148 lb)              We Performed the Following     OPHTHALMOLOGY ADULT REFERRAL        Primary Care Provider Office Phone # Fax #    Chapo Zafar [phone]       [facility] [address] [place] [address]        Equal Access to Services     DAVID AMES AH: Hadii ji Conklin, waaxda luqadaha, qaybta kaalvineet garrison. So [facility] [phone].    ATENCIÓN: Si habla español, tiene a jaime disposición servicios gratuitos de asistencia lingüística. David al [phone].    We comply with applicable federal civil rights laws and [place] laws. We do not discriminate on the basis of race, color, national origin, age, " disability, sex, sexual orientation, or gender identity.            Thank you!     Thank you for choosing [facility]  for your care. Our goal is always to provide you with excellent care. Hearing back from our patients is one way we can continue to improve our services. Please take a few minutes to complete the written survey that you may receive in the mail after your visit with us. Thank you!             Your Updated Medication List - Protect others around you: Learn how to safely use, store and throw away your medicines at www.disposemymeds.org.          This list is accurate as of 7/11/18 10:40 AM.  Always use your most recent med list.                   Brand Name Dispense Instructions for use Diagnosis    acyclovir 400 MG tablet    ZOVIRAX     Take 800 mg by mouth every evening        albuterol 108 (90 Base) MCG/ACT Inhaler    PROAIR HFA/PROVENTIL HFA/VENTOLIN HFA     Inhale 2 puffs into the lungs every 6 hours as needed        amLODIPine 10 MG tablet    NORVASC     Take 10 mg by mouth every evening        ascorbic acid 1000 MG Tabs    vitamin C     Take 2,000 mg by mouth every evening Oh hold since 03/10/2018        ATRIPLA 600-200-300 MG per tablet   Generic drug:  efavirenz-emtrictabine-tenofovir      Take 1 tablet by mouth every evening        cyclobenzaprine 5 MG tablet    FLEXERIL    45 tablet    Take 1 tablet (5 mg) by mouth 3 times daily as needed for muscle spasms    Muscle ache       DOCOSAHEXAENOIC ACID PO      Take 1 capsule by mouth every evening Oh hold since 03/10/2018        esomeprazole 40 MG CR capsule    nexIUM     Take 40 mg by mouth every evening        EYE LUBRICANT Oint      4 times daily        fluticasone 50 MCG/ACT spray    FLONASE     Spray 2 sprays into both nostrils as needed        MAGNESIUM PO      Take 250 mg by mouth every evening Oh hold since 03/10/2018        metoprolol tartrate 100 MG tablet    LOPRESSOR     Take 100 mg by mouth every evening        oxyCODONE IR  5 MG tablet    ROXICODONE    30 tablet    Take 1-2 tablets (5-10 mg) by mouth every 3 hours as needed for other (pain control or improvement in physical function. Hold dose for analgesic side effects.)    S/P craniotomy       polyethylene glycol Packet    MIRALAX/GLYCOLAX    7 packet    Take 17 g by mouth 3 times daily    S/P craniotomy       polyvinyl alcohol 1.4 % ophthalmic solution    LIQUIFILM TEARS     every morning        potassium chloride SA 20 MEQ CR tablet    K-DUR/KLOR-CON M     Take 40 mEq by mouth every evening Oh hold since 03/10/2018        senna-docusate 8.6-50 MG per tablet    SENOKOT-S;PERICOLACE    60 tablet    Take 3 tablets by mouth 2 times daily    S/P craniotomy       tretinoin 0.05 % cream    RETIN-A     Apply topically once a week        VITAMIN D (CHOLECALCIFEROL) PO      Take 1,000 mg by mouth every evening Oh hold since 03/10/2018        XARELTO 20 MG Tabs tablet   Generic drug:  rivaroxaban ANTICOAGULANT      Take 1 tablet (20 mg) by mouth every evening

## 2018-07-11 NOTE — PROGRESS NOTES
Service Date: 07/11/2018      Mr. Santos was seen today to assess his continuing progress following a difficult operation in the right cerebellopontine angle.      Today, he tells me that he is distressed by his persistent facial weakness, having sensations of grittiness or sand in his right eye, having episodes of depression and concerned about his loss of hearing in the right ear and some intermittent imbalance.      I examined him.  His face is nearly symmetric at rest, but there is generalized weakness of the right side of the face.  He does not completely close the right eyelid.  He does, however, have motion in all divisions of the facial nerve, returning nasolabial fold.      I looked at his eye and he does not have any conjunctival erythema.  He denies blurring of vision.        He had an audiogram today and reviewed that with him.  In his left ear, the non-operated side, he has 25 dB speech reception threshold with 96% word recognition.  He has lost some high tones above 2000 Hz.  On the right side which is the operated ear, his speech reception threshold was 45 dB with 8% word recognition.      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      RECOMMENDATIONS:     1.  Abnormal sensation in the right eye.  I advised him to see his ophthalmologist today.  He said that this would not be possible, but allowed us to make an appointment for him to see his ophthalmologist tomorrow.  I advised him to increase the drops and ointment utilization for the rest of the day until he sees the ophthalmologist tomorrow.   2.  Facial paresis.  I again advised him that he has function in all parts of the facial nerve and that we have every reason to believe that it will gradually improve to much closer to normal function over time.  He understands that this will take many months and up to a year or more.  I  reemphasized to him the importance of eye care and protection.   3.  Episodic depression.  We talked about this and that it is perfectly understandable for him to have episodes of depression because he has had a complicated postoperative course.  I offered to arrange an appointment with our health psychologist and he refused that offer.   4.  Loss of useful hearing in the right ear.  We went over the audiogram and I told him that it was very unlikely that the hearing in that ear would improve significantly given the findings on the audiogram.  We talked a bit about the tinnitus associated with it and how difficult it is to reliably treat that.  He says he has some benefit from an earplug.  We also talked about the possibility of a CROS hearing aid that would bring sound from the right to the left.  I also suggested that he might get some help by an aid in his right ear where he has presbyacusis.  He is going to give this matter some consideration.      I offered him vestibular therapy, which he refused.      We are going to have him come back in 2 months to check further on the progress of his face and to have his postoperative MRI scan.  At the end of the visit I again reiterated the offers for Psychology, vestibular therapy, and hearing aid consultations, which he does not want to pursue at this time.         ALLY TURNER MD             D: 2018   T: 2018   MT: AMILCAR      Name:     JESSIE OSWALD   MRN:      [id number]        Account:      [id number]   :      1960           Service Date: 2018      Document: [id number]

## 2018-09-12 ENCOUNTER — OFFICE VISIT (OUTPATIENT)
Dept: NEUROSURGERY | Facility: CLINIC | Age: 58
End: 2018-09-12
Payer: MEDICARE

## 2018-09-12 ENCOUNTER — PATIENT OUTREACH (OUTPATIENT)
Dept: CARE COORDINATION | Facility: CLINIC | Age: 58
End: 2018-09-12

## 2018-09-12 ENCOUNTER — RADIANT APPOINTMENT (OUTPATIENT)
Dept: MRI IMAGING | Facility: CLINIC | Age: 58
End: 2018-09-12
Attending: NEUROLOGICAL SURGERY
Payer: MEDICARE

## 2018-09-12 VITALS
TEMPERATURE: 97.5 F | OXYGEN SATURATION: 95 % | HEART RATE: 78 BPM | DIASTOLIC BLOOD PRESSURE: 64 MMHG | SYSTOLIC BLOOD PRESSURE: 112 MMHG

## 2018-09-12 DIAGNOSIS — G93.89 BRAIN MASS: ICD-10-CM

## 2018-09-12 DIAGNOSIS — G93.89 BRAIN MASS: Primary | ICD-10-CM

## 2018-09-12 PROBLEM — J44.9 COPD (CHRONIC OBSTRUCTIVE PULMONARY DISEASE) (H): Status: ACTIVE | Noted: 2018-09-12

## 2018-09-12 PROBLEM — Z79.01 LONG TERM CURRENT USE OF ANTICOAGULANT: Status: ACTIVE | Noted: 2017-10-18

## 2018-09-12 PROBLEM — Z86.718 PERSONAL HISTORY OF DVT (DEEP VEIN THROMBOSIS): Status: ACTIVE | Noted: 2017-10-18

## 2018-09-12 PROBLEM — Z91.148 CONTROLLED SUBSTANCE AGREEMENT TERMINATED: Status: ACTIVE | Noted: 2017-07-05

## 2018-09-12 PROBLEM — I80.3 PHLEBITIS OR THROMBOPHLEBITIS OF LOWER EXTREMITY: Status: ACTIVE | Noted: 2018-09-12

## 2018-09-12 PROBLEM — K27.9 PUD (PEPTIC ULCER DISEASE): Status: ACTIVE | Noted: 2017-09-25

## 2018-09-12 PROBLEM — H49.21 SIXTH NERVE PALSY OF RIGHT EYE: Status: ACTIVE | Noted: 2017-08-07

## 2018-09-12 PROBLEM — E78.5 DYSLIPIDEMIA: Status: ACTIVE | Noted: 2017-10-27

## 2018-09-12 PROBLEM — Z79.01 ANTICOAGULATED: Status: ACTIVE | Noted: 2017-09-25

## 2018-09-12 PROBLEM — I26.99 PULMONARY EMBOLUS (H): Status: ACTIVE | Noted: 2017-09-25

## 2018-09-12 PROBLEM — E11.9 TYPE 2 DIABETES MELLITUS WITHOUT COMPLICATION, WITHOUT LONG-TERM CURRENT USE OF INSULIN (H): Status: ACTIVE | Noted: 2017-12-26

## 2018-09-12 PROBLEM — Z21 HIV POSITIVE (H): Status: ACTIVE | Noted: 2017-12-26

## 2018-09-12 PROBLEM — F41.9 ANXIETY: Status: ACTIVE | Noted: 2018-09-12

## 2018-09-12 PROBLEM — I10 ESSENTIAL (PRIMARY) HYPERTENSION: Status: ACTIVE | Noted: 2017-09-25

## 2018-09-12 PROBLEM — I26.99 OTHER PULMONARY EMBOLISM WITHOUT ACUTE COR PULMONALE, UNSPECIFIED CHRONICITY (H): Status: ACTIVE | Noted: 2017-10-18

## 2018-09-12 PROBLEM — E78.1 HYPERTRIGLYCERIDEMIA: Status: ACTIVE | Noted: 2018-09-12

## 2018-09-12 RX ORDER — GADOBUTROL 604.72 MG/ML
7.5 INJECTION INTRAVENOUS ONCE
Status: COMPLETED | OUTPATIENT
Start: 2018-09-12 | End: 2018-09-12

## 2018-09-12 RX ORDER — OXYBUTYNIN CHLORIDE 5 MG/1
5 TABLET ORAL DAILY
COMMUNITY
Start: 2018-08-21 | End: 2019-11-04

## 2018-09-12 RX ADMIN — GADOBUTROL 7.5 ML: 604.72 INJECTION INTRAVENOUS at 07:39

## 2018-09-12 ASSESSMENT — ENCOUNTER SYMPTOMS
PANIC: 1
EYE WATERING: 1
EYE PAIN: 0
EYE IRRITATION: 1
INSOMNIA: 0
DEPRESSION: 1
DYSURIA: 0
DECREASED CONCENTRATION: 0
HEMATURIA: 0
FLANK PAIN: 0
DOUBLE VISION: 0
DIFFICULTY URINATING: 0
EYE REDNESS: 0
NERVOUS/ANXIOUS: 1

## 2018-09-12 ASSESSMENT — PAIN SCALES - GENERAL: PAINLEVEL: NO PAIN (0)

## 2018-09-12 NOTE — PATIENT INSTRUCTIONS
1.  Follow up in one year with MRI Brain with and  Without contrast.      Thank you for using [employer] for your healthcare needs.

## 2018-09-12 NOTE — NURSING NOTE
Chief Complaint   Patient presents with     RECHECK     2 MONTH FOLLOW UP     Preventive Care:    Colorectal Cancer Screening: During our visit today, we discussed that it is recommended you receive colorectal cancer screening. Please call or make an appointment with your primary care provider to discuss this. You may also call the [facility] scheduling line ([phone]) to set up a colonoscopy appointment.      Maureen Mazariegos, EMT

## 2018-09-12 NOTE — MR AVS SNAPSHOT
After Visit Summary   9/12/2018    Charles Santos    MRN: [id number]           Patient Information     Date Of Birth          1960        Visit Information        Provider Department      9/12/2018 9:00 AM Azeem Galvin MD [facility] Neurosurgery        Today's Diagnoses     Brain mass    -  1      Care Instructions    1.  Follow up in one year with MRI Brain with and  Without contrast.      Thank you for using  [employer] for your healthcare needs.            Follow-ups after your visit        Follow-up notes from your care team     Return in about 1 year (around 9/12/2019).      Who to contact     Please call your clinic at [phone] to:    Ask questions about your health    Make or cancel appointments    Discuss your medicines    Learn about your test results    Speak to your doctor            Additional Information About Your Visit        Care EveryWhere ID     This is your Care EveryWhere ID. This could be used by other organizations to access your [facility] medical records  [id number]        Your Vitals Were     Pulse Temperature Pulse Oximetry             78 97.5  F (36.4  C) (Oral) 95%          Blood Pressure from Last 3 Encounters:   09/12/18 112/64   07/11/18 109/58   05/09/18 99/60    Weight from Last 3 Encounters:   07/11/18 69.1 kg (152 lb 6.4 oz)   05/09/18 67.4 kg (148 lb 9.6 oz)   04/25/18 67.1 kg (148 lb)               Primary Care Provider Office Phone # Fax #    Chapo Saleem Zafar [phone]       [facility] [address] [place] [address]        Equal Access to Services     [facility] AH: Hadii aad ku hadasho Soomaali, waaxda luqadaha, qaybta kaalmada adeegyada, waxay barbara hatfield. So [facility] [phone].    ATENCIÓN: Si habla español, tiene a jaime disposición servicios gratuitos de asistencia lingüística. Llame al [phone].    We comply with applicable federal civil rights laws and [place] laws.  We do not discriminate on the basis of race, color, national origin, age, disability, sex, sexual orientation, or gender identity.            Thank you!     Thank you for choosing [facility]  for your care. Our goal is always to provide you with excellent care. Hearing back from our patients is one way we can continue to improve our services. Please take a few minutes to complete the written survey that you may receive in the mail after your visit with us. Thank you!             Your Updated Medication List - Protect others around you: Learn how to safely use, store and throw away your medicines at www.disposemymeds.org.          This list is accurate as of 9/12/18 11:59 PM.  Always use your most recent med list.                   Brand Name Dispense Instructions for use Diagnosis    acyclovir 400 MG tablet    ZOVIRAX     Take 800 mg by mouth every evening        albuterol 108 (90 Base) MCG/ACT inhaler    PROAIR HFA/PROVENTIL HFA/VENTOLIN HFA     Inhale 2 puffs into the lungs every 6 hours as needed        amLODIPine 10 MG tablet    NORVASC     Take 10 mg by mouth every evening        ascorbic acid 1000 MG Tabs    vitamin C     Take 2,000 mg by mouth every evening Oh hold since 03/10/2018        ATRIPLA 600-200-300 MG per tablet   Generic drug:  efavirenz-emtrictabine-tenofovir      Take 1 tablet by mouth every evening        cyclobenzaprine 5 MG tablet    FLEXERIL    45 tablet    Take 1 tablet (5 mg) by mouth 3 times daily as needed for muscle spasms    Muscle ache       DOCOSAHEXAENOIC ACID PO      Take 1 capsule by mouth every evening Oh hold since 03/10/2018        esomeprazole 40 MG CR capsule    nexIUM     Take 40 mg by mouth every evening        EYE LUBRICANT Oint      4 times daily        fluticasone 50 MCG/ACT spray    FLONASE     Spray 2 sprays into both nostrils as needed        MAGNESIUM PO      Take 250 mg by mouth every evening Oh hold since 03/10/2018        metoprolol tartrate 100 MG  tablet    LOPRESSOR     Take 100 mg by mouth every evening        oxybutynin 5 MG tablet    DITROPAN     Take 5 mg by mouth daily        oxyCODONE IR 5 MG tablet    ROXICODONE    30 tablet    Take 1-2 tablets (5-10 mg) by mouth every 3 hours as needed for other (pain control or improvement in physical function. Hold dose for analgesic side effects.)    S/P craniotomy       polyethylene glycol Packet    MIRALAX/GLYCOLAX    7 packet    Take 17 g by mouth 3 times daily    S/P craniotomy       polyvinyl alcohol 1.4 % ophthalmic solution    LIQUIFILM TEARS     every morning        potassium chloride SA 20 MEQ CR tablet    K-DUR/KLOR-CON M     Take 40 mEq by mouth every evening Oh hold since 03/10/2018        senna-docusate 8.6-50 MG per tablet    SENOKOT-S;PERICOLACE    60 tablet    Take 3 tablets by mouth 2 times daily    S/P craniotomy       tretinoin 0.05 % cream    RETIN-A     Apply topically once a week        VITAMIN D (CHOLECALCIFEROL) PO      Take 1,000 mg by mouth every evening Oh hold since 03/10/2018        XARELTO 20 MG Tabs tablet   Generic drug:  rivaroxaban ANTICOAGULANT      Take 1 tablet (20 mg) by mouth every evening

## 2018-09-12 NOTE — PROGRESS NOTES
Service Date: 09/12/2018      Mr. Santos is seen for his 3-month postoperative MRI scan.      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      He agrees that he has seen a noticeable but incomplete improvement in his right facial function.  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  His balance is improved.      We talked about depression last time and that seems to be a bit better, but he says he is irritable and wondered if the operation could have altered his personality.        PHYSICAL EXAMINATION:  His facial nerve function on the right remains a House-Brackmann 2.  He is nearly symmetric at rest, has movement in all divisions of the nerve, completely closes his eye, but there is mild asymmetry with voluntary movement.      We reviewed the MRI scan.  The scan shows no evidence of any parenchymal injury to the brain or brainstem.  The tumor is essentially unchanged from preoperatively as we would expect given the issues of the operation.      ASSESSMENT:  Improving facial nerve function in the setting of an incompletely treated meningioma.      RECOMMENDATIONS:  I reassured him that there is no structural reason for his irritability.  I also told him that he has been through a difficult circumstance and he is still in the slow process of facial nerve functional recovery and it would not be surprising to have some depression and irritability related to that.  He has seen a psychologist about the depression and I recommended that he might reconsult his psychologist regarding his personality issues.      I reassured him that continuing recovery of facial nerve function up to year or year and a half after the original incident is common and recommended patience.      We talked about radiosurgery for control of the tumor if it shows signs of growth, but I indicated that we should defer that until the facial nerve recovery in  stable and complete.      Therefore, we recommended to rescan and visit in 1 year.         ALLY TURNER MD             D: 2018   T: 2018   MT: AMILCAR      Name:     JESSIE OSWALD   MRN:      [id number]        Account:      [id number]   :      1960           Service Date: 2018      Document: [id number]

## 2018-09-12 NOTE — DISCHARGE INSTRUCTIONS
MRI Contrast Discharge Instructions    The IV contrast you received today will pass out of your body in your  urine. This will happen in the next 24 hours. You will not feel this process.  Your urine will not change color.    Drink at least 4 extra glasses of water or juice today (unless your doctor  has restricted your fluids). This reduces the stress on your kidneys.  You may take your regular medicines.    If you are on dialysis: It is best to have dialysis today.    If you have a reaction: Most reactions happen right away. If you have  any new symptoms after leaving the hospital (such as hives or swelling),  call your hospital at the correct number below. Or call your family doctor.  If you have breathing distress or wheezing, call 911.    Special instructions: ***    I have read and understand the above information.    Signature:______________________________________ Date:___________    Staff:__________________________________________ Date:___________     Time:__________    [facility] Departments:    ___[place]: [phone]  ___[facility]: [phone]  ___[place]: [phone] ___[facility]: [phone]  ___[facility]: [phone]  ___[facility]: [phone]  ___[phone]  ___[facility]: [phone]  ___[phone]

## 2018-09-12 NOTE — LETTER
9/12/2018       RE: Charles Santos  [address]  [facility] [address]     Dear Colleague,    Thank you for referring your patient, Charles Santos, to the [facility]. Please see a copy of my visit note below.    Service Date: 09/12/2018      Mr. Santos is seen for his 3-month postoperative MRI scan.      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      He agrees that he has seen a noticeable but incomplete improvement in his right facial function.  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  His balance is improved.      We talked about depression last time and that seems to be a bit better, but he says he is irritable and wondered if the operation could have altered his personality.        PHYSICAL EXAMINATION:  His facial nerve function on the right remains a House-Brackmann 2.  He is nearly symmetric at rest, has movement in all divisions of the nerve, completely closes his eye, but there is mild asymmetry with voluntary movement.      We reviewed the MRI scan.  The scan shows no evidence of any parenchymal injury to the brain or brainstem.  The tumor is essentially unchanged from preoperatively as we would expect given the issues of the operation.      ASSESSMENT:  Improving facial nerve function in the setting of an incompletely treated meningioma.      RECOMMENDATIONS:  I reassured him that there is no structural reason for his irritability.  I also told him that he has been through a difficult circumstance and he is still in the slow process of facial nerve functional recovery and it would not be surprising to have some depression and irritability related to that.  He has seen a psychologist about the depression and I recommended that he might reconsult his psychologist regarding his personality issues.      I reassured him that continuing  recovery of facial nerve function up to year or year and a half after the original incident is common and recommended patience.      We talked about radiosurgery for control of the tumor if it shows signs of growth, but I indicated that we should defer that until the facial nerve recovery in stable and complete.      Therefore, we recommended to rescan and visit in 1 year.        D: 2018   T: 2018   MT: LG      Name:     JESSIE OSWALD   MRN:      [id number]        Account:      [id number]   :      1960           Service Date: 2018      Document: [id number]      ALLY TURNER MD

## 2018-12-03 ENCOUNTER — TELEPHONE (OUTPATIENT)
Dept: OPHTHALMOLOGY | Facility: CLINIC | Age: 58
End: 2018-12-03

## 2018-12-03 NOTE — TELEPHONE ENCOUNTER
Health Call Center    Phone Message    May a detailed message be left on voicemail: yes    Reason for Call: Other: Pt is having blurred and doubled vision. The next available appt with Dr Villeda is 12/12/18. Can we please call pt contact pt with an earlier appt if possible.     Action Taken: Message routed to:  [facility]

## 2018-12-04 NOTE — TELEPHONE ENCOUNTER
H/o cranial nerve palsy    Worsening diplopia if past couple weeks-- goes away if closes either eye    Last visit in April with dr. Villeda    No other new neurological changes-- no s/s of stroke per pt  Has had returning headaches in past month or so    Scheduled f/u with dr. Villeda next weds at [facility]  Pt aware of date/time/location    Note to dr. Chambers for review and amendment if applicable  Donnie Degroot RN 11:18 AM 12/04/18

## 2018-12-10 ENCOUNTER — PATIENT OUTREACH (OUTPATIENT)
Dept: CARE COORDINATION | Facility: CLINIC | Age: 58
End: 2018-12-10

## 2018-12-11 DIAGNOSIS — G51.0 SEVENTH NERVE PALSY: ICD-10-CM

## 2018-12-11 DIAGNOSIS — H49.21 SIXTH NERVE PALSY OF RIGHT EYE: Primary | ICD-10-CM

## 2018-12-12 ENCOUNTER — OFFICE VISIT (OUTPATIENT)
Dept: OPHTHALMOLOGY | Facility: CLINIC | Age: 58
End: 2018-12-12
Payer: MEDICARE

## 2018-12-12 DIAGNOSIS — G51.0 SEVENTH NERVE PALSY: ICD-10-CM

## 2018-12-12 DIAGNOSIS — H49.21 SIXTH NERVE PALSY OF RIGHT EYE: Primary | ICD-10-CM

## 2018-12-12 ASSESSMENT — REFRACTION_WEARINGRX
OD_ADD: +2.50
OD_AXIS: 163
OD_HPRISM: 3 BO
OS_CYLINDER: SPHERE
OD_CYLINDER: +0.75
OS_ADD: +2.50
OS_SPHERE: +1.75
OS_HPRISM: 3 BO
OD_SPHERE: +1.50

## 2018-12-12 ASSESSMENT — REFRACTION_MANIFEST
OS_CYLINDER: +0.50
OD_AXIS: 010
OD_ADD: +2.50
OD_CYLINDER: +0.75
OS_AXIS: 175
OD_SPHERE: +1.00
OS_SPHERE: +1.50
OS_ADD: +2.50

## 2018-12-12 ASSESSMENT — SLIT LAMP EXAM - LIDS: COMMENTS: NORMAL

## 2018-12-12 ASSESSMENT — TONOMETRY
OD_IOP_MMHG: 14
IOP_METHOD: ICARE
OS_IOP_MMHG: 14

## 2018-12-12 ASSESSMENT — REFRACTION_FINALRX
OD_HPRISM: 6.0
OD_HBASE: OUT
OS_HPRISM: 6.0
OS_HBASE: OUT

## 2018-12-12 ASSESSMENT — VISUAL ACUITY
OD_CC: 20/20
METHOD: SNELLEN - LINEAR
CORRECTION_TYPE: GLASSES
OS_CC: 20/20

## 2018-12-12 ASSESSMENT — CONF VISUAL FIELD
OD_NORMAL: 1
OS_NORMAL: 1

## 2018-12-12 ASSESSMENT — EXTERNAL EXAM - LEFT EYE: OS_EXAM: NORMAL

## 2018-12-12 ASSESSMENT — CUP TO DISC RATIO
OD_RATIO: 0.35
OS_RATIO: 0.35

## 2018-12-12 NOTE — NURSING NOTE
Chief Complaints and History of Present Illnesses   Patient presents with     Diplopia Follow-Up

## 2018-12-12 NOTE — PROGRESS NOTES
Assessment & Plan     Charles Santos is a 58 year old male with the following diagnoses:   1. Sixth nerve palsy of right eye    2. Seventh nerve palsy       8 month f/u of follow up meningioma right pontomedullary junction with right 6th nerve palsy status-post resection on 4/12/18.  He tumor was extremely fibrous and Dr. Galvin terminated the procedure without significant reduction in volume of the tumor. This was complicated by a right facial droop and CNV palsy immediately after surgery.  His right eye was tearing and his eye wouldn't close fully. He was suppose to be seen by oculoplastics for possible gold weight, but he wasn't sure if his insurance would cover the surgery so never went in.     Overall, he has felt that his right eye tearing has improved. He also feels like he is now getting intermittent binocular double vision occasionally when he is driving. He has been wearing the same pair of ground in prisms since October of 2017.     MRI done 9/2018 showed a stable residual right cerebellopontine angle meningioma compared to 2016. He has a f/u appointment with him next summer.     His visual acuity is 20/20 RIGHT eye, 20/20 LEFT eye.  He has an intermittent 8 prism diopter esotropia with his current ground in prisms (6BO). He has a -2 abduction deficit of the right eye (worse compared to before). He has right lower eyelid laxity with some inferior scleral show right eye. No lagophthalmos. No significant punctate epithelial erosions.     We will increase his prisms to total of 12 base out. His last MRI scan done 9/2018 was stable but concern for meningioma growth. Consider radiation. He also wants to get his right eyelid fixed. Will refer him to plastics.          Attending Physician Attestation:  Complete documentation of historical and exam elements from today's encounter can be found in the full encounter summary report (not reduplicated in this progress note).  I personally obtained the chief  complaint(s) and history of present illness.  I confirmed and edited as necessary the review of systems, past medical/surgical history, family history, social history, and examination findings as documented by others; and I examined the patient myself.  I personally reviewed the relevant tests, images, and reports as documented above.  I formulated and edited as necessary the assessment and plan and discussed the findings and management plan with the patient and family. - Jonathan Chambers MD  Neuro-ophthalmology Fellow

## 2018-12-12 NOTE — TELEPHONE ENCOUNTER
FUTURE VISIT INFORMATION      FUTURE VISIT INFORMATION:    Date: 01/08/19    Time: 145pm    Location: [facility]  REFERRAL INFORMATION:    Referring provider:  KATE VALENZUELA    Referring providers clinic: [facility]    Reason for visit/diagnosis  right eye tearing and his eye won't close fully,  possible gold weight    RECORDS REQUESTED FROM:       Clinic name Comments Records Status Imaging Status   [facility] Notes in Epic per  visit on 12/12/18 In [facility]

## 2019-01-08 ENCOUNTER — OFFICE VISIT (OUTPATIENT)
Dept: OPHTHALMOLOGY | Facility: CLINIC | Age: 59
End: 2019-01-08
Payer: MEDICARE

## 2019-01-08 ENCOUNTER — PRE VISIT (OUTPATIENT)
Dept: OPHTHALMOLOGY | Facility: CLINIC | Age: 59
End: 2019-01-08

## 2019-01-08 DIAGNOSIS — H49.21 SIXTH NERVE PALSY OF RIGHT EYE: ICD-10-CM

## 2019-01-08 DIAGNOSIS — G51.0 SEVENTH NERVE PALSY: ICD-10-CM

## 2019-01-08 ASSESSMENT — VISUAL ACUITY
OS_CC: 20/20
OS_CC+: -1
METHOD: SNELLEN - LINEAR
OD_CC: 20/20
CORRECTION_TYPE: GLASSES

## 2019-01-08 ASSESSMENT — EXTERNAL EXAM - LEFT EYE: OS_EXAM: NORMAL

## 2019-01-08 ASSESSMENT — CONF VISUAL FIELD
OD_NORMAL: 1
OS_NORMAL: 1

## 2019-01-08 ASSESSMENT — TONOMETRY
OD_IOP_MMHG: 13
IOP_METHOD: ICARE
OS_IOP_MMHG: 14

## 2019-01-08 NOTE — NURSING NOTE
Chief Complaints and History of Present Illnesses   Patient presents with     Tearing Evaluation     Chief Complaint(s) and History of Present Illness(es)     Tearing Evaluation     Laterality: right eye    Associated symptoms: foreign body sensation and irritation.  Negative for burning, mattering, itching, blurred vision and discharge    Duration: 1 month    Course: gradually improving    Treatments tried: artificial tears    Response to treatment: mild improvement              Comments     Patient is using AT ronn Tony January 8, 2019 1:54 PM

## 2019-01-08 NOTE — LETTER
2019         RE:  :  MRN: Charles Santos  1960  [id number]     Dear Dr. Villeda,    Thank you for asking me to see your patient, Charles Santos, for an oculoplastic   consultation.  My assessment and plan are below.  For further details, please see my attached clinic note.           Chief Complaint(s) and History of Present Illness(es)     Tearing Evaluation     Laterality: right eye    Associated symptoms: foreign body sensation and irritation.  Negative for   burning, mattering, itching, blurred vision and discharge    Duration: 1 month    Course: gradually improving    Treatments tried: artificial tears    Response to treatment: mild improvement         Comments     Patient is using AT prn    Mirna Chavo 2019 1:54 PM        HPI: Patient with hx of meninigioma resection 18 with subsequent right 6th and 7th nerve palsies. Referred by Dr. Chambers for evaluation of right lower eyelid. Patient notes drooping of right lower eyelid with some FB sensation. States eyelid position has improved since onset. Was having issues with tearing but states in the last month it has been significantly better. Using AT's PRN.    Assessment & Plan     Charles Santos is a 58 year old male with the following diagnoses:   1. Sixth nerve palsy of right eye    2. Seventh nerve palsy       - Overall symptoms improving since onset but continues to have RLL laxity  - Suspect tearing due to poor eyelid mechanics (RLL laxity) and per patient is improving  - No significant lagophthalmos on exam  - Continue AT's PRN, can use gel/ointment at bedtime PRN  - Consider tightening of RLL with LTS procedure    Yifan Ivan MD  Ophthalmology Resident, PGY-2    Agree, continuing to improve, looks like may be getting synkinesis with some hypertonicity in midface. Observe at this point.     Also symptomatic of dermatochalasis and brow ptosis left eye, but not enough to consider surgery right now.    Continue artificial  tears  F/u 4 months, can reassess effects of facial paralysis and mild residual lagophthalmos, as well as how much left upper lid obstructs peripheral vision.    Attending Physician Attestation:  Complete documentation of historical and exam elements from today's encounter can be found in the full encounter summary report (not reduplicated in this progress note).  I personally obtained the chief complaint(s) and history of present illness.  I confirmed and edited as necessary the review of systems, past medical/surgical history, family history, social history, and examination findings as documented by others; and I examined the patient myself.  I personally reviewed the relevant tests, images, and reports as documented above.  I formulated and edited as necessary the assessment and plan and discussed the findings and management plan with the patient and family. - Radha Guerra MD            Again, thank you for allowing me to participate in the care of your patient.      Sincerely,    Radha Guerra MD  Department of Ophthalmology and Visual Neurosciences  [facility]    CC: Jonathan Villeda MD  [address] [place] [address]  [facility] [address]  VIA In Basket

## 2019-01-08 NOTE — PROGRESS NOTES
Chief Complaint(s) and History of Present Illness(es)     Tearing Evaluation     Laterality: right eye    Associated symptoms: foreign body sensation and irritation.  Negative for   burning, mattering, itching, blurred vision and discharge    Duration: 1 month    Course: gradually improving    Treatments tried: artificial tears    Response to treatment: mild improvement         Comments     Patient is using AT prn    Mirna Tony January 8, 2019 1:54 PM        HPI: Patient with hx of meninigioma resection 04/12/18 with subsequent right 6th and 7th nerve palsies. Referred by Dr. Chambers for evaluation of right lower eyelid. Patient notes drooping of right lower eyelid with some FB sensation. States eyelid position has improved since onset. Was having issues with tearing but states in the last month it has been significantly better. Using AT's PRN.    Assessment & Plan     Charles Santos is a 58 year old male with the following diagnoses:   1. Sixth nerve palsy of right eye    2. Seventh nerve palsy       - Overall symptoms improving since onset but continues to have RLL laxity  - Suspect tearing due to poor eyelid mechanics (RLL laxity) and per patient is improving  - No significant lagophthalmos on exam  - Continue AT's PRN, can use gel/ointment at bedtime PRN  - Consider tightening of RLL with LTS procedure    Yifan Ivan MD  Ophthalmology Resident, PGY-2    Agree, continuing to improve, looks like may be getting synkinesis with some hypertonicity in midface. Observe at this point.     Also symptomatic of dermatochalasis and brow ptosis left eye, but not enough to consider surgery right now.    Continue artificial tears  F/u 4 months, can reassess effects of facial paralysis and mild residual lagophthalmos, as well as how much left upper lid obstructs peripheral vision.    Attending Physician Attestation:  Complete documentation of historical and exam elements from today's encounter can be found in the  full encounter summary report (not reduplicated in this progress note).  I personally obtained the chief complaint(s) and history of present illness.  I confirmed and edited as necessary the review of systems, past medical/surgical history, family history, social history, and examination findings as documented by others; and I examined the patient myself.  I personally reviewed the relevant tests, images, and reports as documented above.  I formulated and edited as necessary the assessment and plan and discussed the findings and management plan with the patient and family. - Radha Guerra MD

## 2019-01-18 ENCOUNTER — PATIENT OUTREACH (OUTPATIENT)
Dept: NEUROLOGY | Facility: CLINIC | Age: 59
End: 2019-01-18

## 2019-01-18 NOTE — PROGRESS NOTES
Patient calling with several issues today.  LOV: Kamar 9/12/18 Mr. Santos is seen for his 3-month postoperative MRI scan.    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    Pt states headaches are becoming uncontrolled and constant.  Pt states taking at least 6-8 tylenol per day and HA's remain.  He also states double vision, blurry vision returns intermittently.  Pt last seen by Ophth 1/8/19 showing mild improvement.      Pt asking for medication for headaches.  Will research with Dr Galvin and get back to patient.  Voices understanding.

## 2019-01-22 NOTE — PROGRESS NOTES
Callback to patient, left message on voice mail asking how he has been feeling and situation with [facility].    Please return call.

## 2019-01-22 NOTE — PROGRESS NOTES
"Patient calling stating he was \"slaughtered, disfigured, with a 7 hour operation he was left with hearing and vision issues.\"  Pt also has mood swings.    Explained I will be calling tomorrow with Dr. Galvin. Pt states does not want to talk to the [facility] again.    Will get with Dr. Galvin.      "

## 2019-02-19 ENCOUNTER — OFFICE (OUTPATIENT)
Dept: URBAN - NONMETROPOLITAN AREA CLINIC 13 | Facility: CLINIC | Age: 59
End: 2019-02-19
Payer: MEDICARE

## 2019-02-19 ENCOUNTER — OFFICE (OUTPATIENT)
Dept: URBAN - NONMETROPOLITAN AREA CLINIC 13 | Facility: CLINIC | Age: 59
End: 2019-02-19

## 2019-02-19 VITALS
HEART RATE: 77 BPM | DIASTOLIC BLOOD PRESSURE: 63 MMHG | WEIGHT: 262 LBS | OXYGEN SATURATION: 94 % | SYSTOLIC BLOOD PRESSURE: 107 MMHG | HEIGHT: 70 IN

## 2019-02-19 VITALS — HEIGHT: 70 IN

## 2019-02-19 DIAGNOSIS — K80.20 CALCULUS OF GALLBLADDER WITHOUT CHOLECYSTITIS WITHOUT OBSTRU: ICD-10-CM

## 2019-02-19 DIAGNOSIS — Z01.812 ENCOUNTER FOR PREPROCEDURAL LABORATORY EXAMINATION: ICD-10-CM

## 2019-02-19 DIAGNOSIS — K76.0 FATTY (CHANGE OF) LIVER, NOT ELSEWHERE CLASSIFIED: ICD-10-CM

## 2019-02-19 DIAGNOSIS — B18.2 CHRONIC VIRAL HEPATITIS C: ICD-10-CM

## 2019-02-19 DIAGNOSIS — Z79.899 OTHER LONG TERM (CURRENT) DRUG THERAPY: ICD-10-CM

## 2019-02-19 DIAGNOSIS — K59.00 CONSTIPATION, UNSPECIFIED: ICD-10-CM

## 2019-02-19 DIAGNOSIS — Z12.11 ENCOUNTER FOR SCREENING FOR MALIGNANT NEOPLASM OF COLON: ICD-10-CM

## 2019-02-19 DIAGNOSIS — K21.9 GASTRO-ESOPHAGEAL REFLUX DISEASE WITHOUT ESOPHAGITIS: ICD-10-CM

## 2019-02-19 LAB
CBC SYSMEX: HEMATOCRIT: 42.2 % (ref 37–47)
CBC SYSMEX: HEMOGLOBIN: 14.2 G/DL (ref 14–18)
CBC SYSMEX: LYMPHS %: 26.7 % (ref 20.5–40.5)
CBC SYSMEX: LYMPHS ABSOLUTE: 2.4 10*3U/L (ref 1.3–2.9)
CBC SYSMEX: MCH: 29 PG (ref 27–32)
CBC SYSMEX: MCHC: 33.6 G/DL (ref 28.5–35)
CBC SYSMEX: MCV: 86.3 FL (ref 84–100)
CBC SYSMEX: MONOS %: 6.4 % (ref 2–10)
CBC SYSMEX: MONOS ABSOLUTE: 0.6 10*3U/L (ref 0.3–3.8)
CBC SYSMEX: MPV: 9.7 FL (ref 8.3–11.9)
CBC SYSMEX: NEUT. %: 66.9 % (ref 43–67)
CBC SYSMEX: NEUT. ABSOLUTE: 6 10*3U/L — HIGH (ref 2.2–4.8)
CBC SYSMEX: PLATELETS: 311 10*3U/L (ref 130–440)
CBC SYSMEX: RBC: 4.9 10*6U/L (ref 4.2–5.4)
CBC SYSMEX: RDW: 12.1 % (ref 11.5–15.5)
CBC SYSMEX: WBC: 9 10*3U/L (ref 4.5–10.5)
COMPLETE METABOLIC: ALBUMIN: 4.1 G/DL (ref 3.5–5.2)
COMPLETE METABOLIC: ALK. PHOS: 98 U/L (ref 40–150)
COMPLETE METABOLIC: ALT: 47 U/L (ref 0–55)
COMPLETE METABOLIC: AST: 20 U/L (ref 5–34)
COMPLETE METABOLIC: BUN: 20 MG/DL (ref 7–26)
COMPLETE METABOLIC: CALCIUM: 9.4 MG/DL (ref 8.4–10.3)
COMPLETE METABOLIC: CHLORIDE: 97 MMOL/L — LOW (ref 98–107)
COMPLETE METABOLIC: CO2: 30 MEQ/L — HIGH (ref 22–29)
COMPLETE METABOLIC: CREATININE: 0.85 MG/DL (ref 0.57–1.25)
COMPLETE METABOLIC: GFR - AFRICAN AMERICAN: 118.8 (ref 59–200)
COMPLETE METABOLIC: GFR - NON AFRICAN AMERICAN: 98.1 (ref 59–200)
COMPLETE METABOLIC: GLUCOSE: 217 MG/DL — HIGH (ref 70–99)
COMPLETE METABOLIC: POTASSIUM: 4.5 MMOL/L (ref 3.5–5.3)
COMPLETE METABOLIC: SODIUM: 140 MMOL/L (ref 136–145)
COMPLETE METABOLIC: TOTAL BILIRUBIN: 0.4 MG/DL (ref 0.2–1.2)
COMPLETE METABOLIC: TOTAL PROTEIN: 8.1 G/DL (ref 6.4–8.3)

## 2019-02-19 PROCEDURE — 99204 OFFICE O/P NEW MOD 45 MIN: CPT

## 2019-02-19 PROCEDURE — 80053 COMPREHEN METABOLIC PANEL: CPT

## 2019-02-19 PROCEDURE — 85025 COMPLETE CBC W/AUTO DIFF WBC: CPT

## 2019-02-19 RX ORDER — POLYETHYLENE GLYCOL 3350, SODIUM SULFATE ANHYDROUS, SODIUM BICARBONATE, SODIUM CHLORIDE, POTASSIUM CHLORIDE 236; 22.74; 6.74; 5.86; 2.97 G/4L; G/4L; G/4L; G/4L; G/4L
POWDER, FOR SOLUTION ORAL
Qty: 1 | Refills: 0 | Status: COMPLETED
Start: 2019-02-19 | End: 2019-03-12

## 2019-02-19 RX ORDER — LUBIPROSTONE 24 UG/1
CAPSULE, GELATIN COATED ORAL
Qty: 180 | Refills: 1 | Status: ACTIVE
Start: 2019-02-19

## 2019-02-19 RX ORDER — BISACODYL 5 MG
TABLET, DELAYED RELEASE (ENTERIC COATED) ORAL
Qty: 8 | Refills: 0 | Status: COMPLETED
Start: 2019-02-19 | End: 2019-03-12

## 2019-02-19 RX ORDER — ONDANSETRON HYDROCHLORIDE 4 MG/1
TABLET, FILM COATED ORAL
Qty: 2 | Refills: 0 | Status: COMPLETED
Start: 2019-02-19 | End: 2019-03-12

## 2019-02-20 ENCOUNTER — OFFICE (OUTPATIENT)
Dept: URBAN - NONMETROPOLITAN AREA CLINIC 13 | Facility: CLINIC | Age: 59
End: 2019-02-20
Payer: MEDICARE

## 2019-02-20 ENCOUNTER — AMBULATORY SURGICAL CENTER (OUTPATIENT)
Dept: URBAN - NONMETROPOLITAN AREA SURGERY 2 | Facility: SURGERY | Age: 59
End: 2019-02-20
Payer: MEDICARE

## 2019-02-20 VITALS
HEART RATE: 84 BPM | RESPIRATION RATE: 18 BRPM | DIASTOLIC BLOOD PRESSURE: 76 MMHG | RESPIRATION RATE: 20 BRPM | SYSTOLIC BLOOD PRESSURE: 106 MMHG | SYSTOLIC BLOOD PRESSURE: 123 MMHG | WEIGHT: 262 LBS | HEART RATE: 70 BPM | HEART RATE: 81 BPM | HEIGHT: 70 IN | DIASTOLIC BLOOD PRESSURE: 61 MMHG | DIASTOLIC BLOOD PRESSURE: 57 MMHG | OXYGEN SATURATION: 96 % | SYSTOLIC BLOOD PRESSURE: 124 MMHG | DIASTOLIC BLOOD PRESSURE: 60 MMHG | HEART RATE: 79 BPM | SYSTOLIC BLOOD PRESSURE: 134 MMHG | DIASTOLIC BLOOD PRESSURE: 64 MMHG | DIASTOLIC BLOOD PRESSURE: 59 MMHG | HEART RATE: 76 BPM | SYSTOLIC BLOOD PRESSURE: 104 MMHG | OXYGEN SATURATION: 94 % | SYSTOLIC BLOOD PRESSURE: 100 MMHG | OXYGEN SATURATION: 98 %

## 2019-02-20 DIAGNOSIS — K21.9 GASTRO-ESOPHAGEAL REFLUX DISEASE WITHOUT ESOPHAGITIS: ICD-10-CM

## 2019-02-20 DIAGNOSIS — K31.9 DISEASE OF STOMACH AND DUODENUM, UNSPECIFIED: ICD-10-CM

## 2019-02-20 DIAGNOSIS — K31.89 OTHER DISEASES OF STOMACH AND DUODENUM: ICD-10-CM

## 2019-02-20 DIAGNOSIS — K29.70 GASTRITIS, UNSPECIFIED, WITHOUT BLEEDING: ICD-10-CM

## 2019-02-20 PROBLEM — R12 HEARTBURN: Status: ACTIVE | Noted: 2019-02-20

## 2019-02-20 PROCEDURE — 00731 ANES UPR GI NDSC PX NOS: CPT | Mod: QZ,QS

## 2019-02-20 PROCEDURE — 43239 EGD BIOPSY SINGLE/MULTIPLE: CPT | Performed by: INTERNAL MEDICINE

## 2019-02-20 PROCEDURE — 88305 TISSUE EXAM BY PATHOLOGIST: CPT | Mod: TC | Performed by: INTERNAL MEDICINE

## 2019-02-20 PROCEDURE — 00731 ANES UPR GI NDSC PX NOS: CPT | Mod: QS,QZ

## 2019-02-20 RX ORDER — OMEPRAZOLE 40 MG/1
CAPSULE, DELAYED RELEASE PELLETS ORAL
Qty: 90 | Refills: 1 | Status: ACTIVE
Start: 2019-02-20

## 2019-02-20 RX ORDER — RANITIDINE HYDROCHLORIDE 300 MG/1
TABLET, FILM COATED ORAL
Qty: 90 | Refills: 1 | Status: ACTIVE
Start: 2019-02-20

## 2019-02-20 RX ORDER — OMEPRAZOLE MAGNESIUM 20.6 MG/1
TABLET, DELAYED RELEASE ORAL
Qty: 0 | Refills: 0 | Status: COMPLETED
End: 2019-02-20

## 2019-02-21 ENCOUNTER — TRANSFERRED RECORDS (OUTPATIENT)
Dept: HEALTH INFORMATION MANAGEMENT | Facility: CLINIC | Age: 59
End: 2019-02-21

## 2019-02-22 ENCOUNTER — TELEPHONE (OUTPATIENT)
Dept: NEUROSURGERY | Facility: CLINIC | Age: 59
End: 2019-02-22

## 2019-02-22 NOTE — TELEPHONE ENCOUNTER
I received a message yesterday that Mr. Santos wanted to be seen in clinic. I called him this morning and offered to see him today. He said he had other obligations and asked for an appointment next week. He said he would call Sha Walsh to schedule an appointment on Thursday February 28.    He indicated that he continues to have headache and some weakness, but did not indicate that a visit before next week was necessary.

## 2019-02-27 LAB — SURGICAL PROCEDURE: PDFREPORT1: (no result)

## 2019-03-01 ENCOUNTER — OFFICE VISIT (OUTPATIENT)
Dept: NEUROSURGERY | Facility: CLINIC | Age: 59
End: 2019-03-01
Payer: MEDICARE

## 2019-03-01 VITALS
SYSTOLIC BLOOD PRESSURE: 129 MMHG | DIASTOLIC BLOOD PRESSURE: 62 MMHG | WEIGHT: 157.6 LBS | HEART RATE: 56 BPM | HEIGHT: 69 IN | BODY MASS INDEX: 23.34 KG/M2 | OXYGEN SATURATION: 98 %

## 2019-03-01 DIAGNOSIS — D32.9 MENINGIOMA (H): Primary | ICD-10-CM

## 2019-03-01 ASSESSMENT — PAIN SCALES - GENERAL: PAINLEVEL: MODERATE PAIN (4)

## 2019-03-01 ASSESSMENT — PATIENT HEALTH QUESTIONNAIRE - PHQ9: SUM OF ALL RESPONSES TO PHQ QUESTIONS 1-9: 5

## 2019-03-01 ASSESSMENT — MIFFLIN-ST. JEOR: SCORE: 1520.25

## 2019-03-01 NOTE — LETTER
3/1/2019       RE: Charles Santos  [address]  [facility] [address]     Dear Colleague,    Thank you for referring your patient, Charles Santos, to the [facility] NEUROSURGERY at [facility]. Please see a copy of my visit note below.    Service Date: 03/01/2019      Mr. Santos was seen on 03/01 in the Neurosurgery Clinic at his request because of concerns about headache.      We know Mr. Santos well.  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  The operation was quite difficult and had to be terminated before significant tumor was removed.  He has suffered measurable hearing loss and mild facial weakness and some imbalance postoperatively.      He has noticed some return of headache in the past month or 2.  The headache is constant and aching in nature and migratory.  Sometimes it involves the back of the head, sometimes the top and sides.  It is not specifically related to the incision.      He saw Dr. 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  Dr. Guerra documented the situation and recommended waiting another 4 months before reassessing to decide when interventions might be appropriate.      Mr. Santos tells me that occasionally he gets this feeling of irritation in his eye, but that this usually resolves either spontaneously or with eyedrops.  He has gotten new glasses and he is happy with the new prescription.      PHYSICAL EXAMINATION:  On examination today, he is bright, alert and we had a very reasonable conversation.  His facial nerve function remains House-Brackmann class II.  He has complete eye closure and his eye looks quite healthy today.  He does have some drooping of the upper and lower eyelid on the right.  At rest, his face is symmetric and he has mild weakness with his smile but there is movement in all divisions of the facial nerve.      He  complains of some continuing decreased sensation on the right side, but he has intact corneal sensation by history and can feel light touch in all divisions of the trigeminal nerve.      He says that his balance is still occasionally off but improving.      He brought up the problem of mood swings.  This is a longstanding diagnosis of anxiety and depression, but unfortunately he has not had regular psychiatric care recently.  He says he has tried to make contacts but has been told he cannot get an appointment for several months.      ASSESSMENT:  Mr. Oswald is actually showing a slow steady improvement from the cranial nerve issues that followed his surgery.  I reassured him about this.      I told him that the headache did not have characteristics that suggest that it is directly related to the tumor or that suggest increased intracranial pressure.      PLAN:  We will reset his 1 year followup appointment with an MRI scan for April, the 1-year anniversary of his surgery.      I spoke with Demetrius Hernandez, the health psychologist here at the [facility] and he is going to have his office contact Mr. Oswald to make an appointment to reestablish care in the near future.      I have made a referral to Audiology for consideration of a CROS hearing aid system that would help compensate for his significantly decreased hearing with very poor word recognition on the right side.  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      The followup plan is to see him in April with an MRI scan to assess the status of his tumor.      D: 2019   T: 2019   MT: LG      Name:     JESSIE OSWALD   MRN:      [id number]        Account:      [id number]   :      1960           Service Date: 2019      Document: [id number]        Again, thank you for allowing me to participate in the care of your patient.       Sincerely,    Azeem Galvin MD

## 2019-03-01 NOTE — PROGRESS NOTES
Service Date: 03/01/2019      Mr. Santos was seen on 03/01 in the Neurosurgery Clinic at his request because of concerns about headache.      We know Mr. Santos well.  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  The operation was quite difficult and had to be terminated before significant tumor was removed.  He has suffered measurable hearing loss and mild facial weakness and some imbalance postoperatively.      He has noticed some return of headache in the past month or 2.  The headache is constant and aching in nature and migratory.  Sometimes it involves the back of the head, sometimes the top and sides.  It is not specifically related to the incision.      He saw Dr. 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  Dr. Guerra documented the situation and recommended waiting another 4 months before reassessing to decide when interventions might be appropriate.      Mr. Santos tells me that occasionally he gets this feeling of irritation in his eye, but that this usually resolves either spontaneously or with eyedrops.  He has gotten new glasses and he is happy with the new prescription.      PHYSICAL EXAMINATION:  On examination today, he is bright, alert and we had a very reasonable conversation.  His facial nerve function remains House-Brackmann class II.  He has complete eye closure and his eye looks quite healthy today.  He does have some drooping of the upper and lower eyelid on the right.  At rest, his face is symmetric and he has mild weakness with his smile but there is movement in all divisions of the facial nerve.      He complains of some continuing decreased sensation on the right side, but he has intact corneal sensation by history and can feel light touch in all divisions of the trigeminal nerve.      He says that his balance is still occasionally off but improving.      He brought up the problem of mood  swings.  This is a longstanding diagnosis of anxiety and depression, but unfortunately he has not had regular psychiatric care recently.  He says he has tried to make contacts but has been told he cannot get an appointment for several months.      ASSESSMENT:  Mr. Oswald is actually showing a slow steady improvement from the cranial nerve issues that followed his surgery.  I reassured him about this.      I told him that the headache did not have characteristics that suggest that it is directly related to the tumor or that suggest increased intracranial pressure.      PLAN:  We will reset his 1 year followup appointment with an MRI scan for April, the 1-year anniversary of his surgery.      I spoke with Demetrius Hernandez, the health psychologist here at the [facility] and he is going to have his office contact Mr. Oswald to make an appointment to reestablish care in the near future.      I have made a referral to Audiology for consideration of a CROS hearing aid system that would help compensate for his significantly decreased hearing with very poor word recognition on the right side.  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      The followup plan is to see him in April with an MRI scan to assess the status of his tumor.         ALLY TURNER MD             D: 2019   T: 2019   MT: AMILCAR      Name:     JESSIE OSWALD   MRN:      [id number]        Account:      [id number]   :      1960           Service Date: 2019      Document: [id number]

## 2019-03-12 ENCOUNTER — AMBULATORY SURGICAL CENTER (OUTPATIENT)
Dept: URBAN - NONMETROPOLITAN AREA SURGERY 2 | Facility: SURGERY | Age: 59
End: 2019-03-12
Payer: MEDICARE

## 2019-03-12 VITALS
SYSTOLIC BLOOD PRESSURE: 96 MMHG | OXYGEN SATURATION: 94 % | HEART RATE: 62 BPM | OXYGEN SATURATION: 99 % | DIASTOLIC BLOOD PRESSURE: 55 MMHG | HEART RATE: 73 BPM | DIASTOLIC BLOOD PRESSURE: 62 MMHG | SYSTOLIC BLOOD PRESSURE: 118 MMHG | SYSTOLIC BLOOD PRESSURE: 108 MMHG | SYSTOLIC BLOOD PRESSURE: 83 MMHG | OXYGEN SATURATION: 95 % | HEART RATE: 65 BPM | DIASTOLIC BLOOD PRESSURE: 67 MMHG | SYSTOLIC BLOOD PRESSURE: 110 MMHG | DIASTOLIC BLOOD PRESSURE: 69 MMHG | HEIGHT: 70 IN | DIASTOLIC BLOOD PRESSURE: 47 MMHG | RESPIRATION RATE: 18 BRPM | OXYGEN SATURATION: 98 % | DIASTOLIC BLOOD PRESSURE: 54 MMHG | HEART RATE: 66 BPM | SYSTOLIC BLOOD PRESSURE: 86 MMHG | SYSTOLIC BLOOD PRESSURE: 124 MMHG | WEIGHT: 262 LBS

## 2019-03-12 DIAGNOSIS — Z12.11 ENCOUNTER FOR SCREENING FOR MALIGNANT NEOPLASM OF COLON: ICD-10-CM

## 2019-03-12 PROCEDURE — G0121 COLON CA SCRN NOT HI RSK IND: HCPCS | Performed by: INTERNAL MEDICINE

## 2019-03-14 ENCOUNTER — AMBULATORY SURGICAL CENTER (OUTPATIENT)
Dept: URBAN - NONMETROPOLITAN AREA SURGERY 2 | Facility: SURGERY | Age: 59
End: 2019-03-14
Payer: MEDICARE

## 2019-03-14 ENCOUNTER — OFFICE (OUTPATIENT)
Dept: URBAN - NONMETROPOLITAN AREA CLINIC 13 | Facility: CLINIC | Age: 59
End: 2019-03-14
Payer: MEDICARE

## 2019-03-14 VITALS
HEART RATE: 62 BPM | SYSTOLIC BLOOD PRESSURE: 121 MMHG | WEIGHT: 262 LBS | SYSTOLIC BLOOD PRESSURE: 112 MMHG | OXYGEN SATURATION: 95 % | SYSTOLIC BLOOD PRESSURE: 90 MMHG | RESPIRATION RATE: 20 BRPM | SYSTOLIC BLOOD PRESSURE: 109 MMHG | HEART RATE: 58 BPM | RESPIRATION RATE: 18 BRPM | OXYGEN SATURATION: 97 % | HEART RATE: 70 BPM | DIASTOLIC BLOOD PRESSURE: 44 MMHG | SYSTOLIC BLOOD PRESSURE: 114 MMHG | SYSTOLIC BLOOD PRESSURE: 93 MMHG | OXYGEN SATURATION: 92 % | DIASTOLIC BLOOD PRESSURE: 43 MMHG | DIASTOLIC BLOOD PRESSURE: 59 MMHG | OXYGEN SATURATION: 98 % | SYSTOLIC BLOOD PRESSURE: 107 MMHG | DIASTOLIC BLOOD PRESSURE: 54 MMHG | OXYGEN SATURATION: 88 % | DIASTOLIC BLOOD PRESSURE: 58 MMHG | HEIGHT: 70 IN | DIASTOLIC BLOOD PRESSURE: 66 MMHG | RESPIRATION RATE: 17 BRPM | SYSTOLIC BLOOD PRESSURE: 98 MMHG | OXYGEN SATURATION: 94 % | OXYGEN SATURATION: 99 % | DIASTOLIC BLOOD PRESSURE: 68 MMHG | RESPIRATION RATE: 22 BRPM | HEART RATE: 65 BPM | HEART RATE: 64 BPM | DIASTOLIC BLOOD PRESSURE: 62 MMHG | HEART RATE: 68 BPM | SYSTOLIC BLOOD PRESSURE: 95 MMHG

## 2019-03-14 VITALS — HEIGHT: 70 IN

## 2019-03-14 DIAGNOSIS — B18.2 CHRONIC VIRAL HEPATITIS C: ICD-10-CM

## 2019-03-14 DIAGNOSIS — R74.0 NONSPECIFIC ELEVATION OF LEVELS OF TRANSAMINASE AND LACTIC A: ICD-10-CM

## 2019-03-14 PROCEDURE — 47000 NEEDLE BIOPSY OF LIVER PERQ: CPT | Performed by: INTERNAL MEDICINE

## 2019-03-14 PROCEDURE — 00702 ANES UPR ANT ABD WALL LVR BX: CPT | Mod: QS,QZ

## 2019-03-14 PROCEDURE — 00702 ANES UPR ANT ABD WALL LVR BX: CPT | Mod: QZ,QS

## 2019-03-14 PROCEDURE — 85610 PROTHROMBIN TIME: CPT | Performed by: INTERNAL MEDICINE

## 2019-03-21 LAB — SURGICAL PROCEDURE: PDFREPORT1: (no result)

## 2019-04-10 ENCOUNTER — ANCILLARY PROCEDURE (OUTPATIENT)
Dept: MRI IMAGING | Facility: CLINIC | Age: 59
End: 2019-04-10
Attending: NEUROLOGICAL SURGERY
Payer: MEDICARE

## 2019-04-10 ENCOUNTER — OFFICE VISIT (OUTPATIENT)
Dept: NEUROSURGERY | Facility: CLINIC | Age: 59
End: 2019-04-10
Payer: MEDICARE

## 2019-04-10 VITALS
OXYGEN SATURATION: 97 % | HEIGHT: 70 IN | WEIGHT: 158 LBS | SYSTOLIC BLOOD PRESSURE: 112 MMHG | BODY MASS INDEX: 22.62 KG/M2 | DIASTOLIC BLOOD PRESSURE: 70 MMHG | HEART RATE: 47 BPM

## 2019-04-10 DIAGNOSIS — D32.9 MENINGIOMA (H): Primary | ICD-10-CM

## 2019-04-10 DIAGNOSIS — G93.89 BRAIN MASS: ICD-10-CM

## 2019-04-10 RX ORDER — LORAZEPAM 0.5 MG/1
TABLET ORAL
Refills: 0 | COMMUNITY
Start: 2019-03-21

## 2019-04-10 RX ORDER — CITALOPRAM HYDROBROMIDE 10 MG/1
10 TABLET ORAL
COMMUNITY
Start: 2019-03-21 | End: 2024-07-26

## 2019-04-10 RX ORDER — GADOBUTROL 604.72 MG/ML
7.5 INJECTION INTRAVENOUS ONCE
Status: COMPLETED | OUTPATIENT
Start: 2019-04-10 | End: 2019-04-10

## 2019-04-10 RX ORDER — TRAZODONE HYDROCHLORIDE 50 MG/1
50 TABLET, FILM COATED ORAL
COMMUNITY
Start: 2019-01-16 | End: 2019-10-31

## 2019-04-10 RX ADMIN — GADOBUTROL 7 ML: 604.72 INJECTION INTRAVENOUS at 08:37

## 2019-04-10 ASSESSMENT — ENCOUNTER SYMPTOMS
INSOMNIA: 0
SMELL DISTURBANCE: 1
NECK MASS: 0
EYE IRRITATION: 1
POLYDIPSIA: 1
WEIGHT LOSS: 0
NERVOUS/ANXIOUS: 1
PANIC: 0
TASTE DISTURBANCE: 1
SINUS PAIN: 0
SORE THROAT: 1
FEVER: 0
DECREASED APPETITE: 0
CHILLS: 0
DEPRESSION: 1
DOUBLE VISION: 0
SINUS CONGESTION: 1
FATIGUE: 0
WEIGHT GAIN: 0
EYE PAIN: 0
DECREASED CONCENTRATION: 1
HALLUCINATIONS: 0
EYE WATERING: 0
NIGHT SWEATS: 0
ALTERED TEMPERATURE REGULATION: 1
INCREASED ENERGY: 1
EYE REDNESS: 0
POLYPHAGIA: 1
TROUBLE SWALLOWING: 0
HOARSE VOICE: 1

## 2019-04-10 ASSESSMENT — PAIN SCALES - GENERAL: PAINLEVEL: NO PAIN (0)

## 2019-04-10 ASSESSMENT — MIFFLIN-ST. JEOR: SCORE: 1532.93

## 2019-04-10 NOTE — PROGRESS NOTES
"4/10/2019     SUBJECTIVE:   Mr. Charles Santos is a 58 yo male with a history of a right petrous ridge meningioma and HIV.    An attempted tumor resection was performed on 04/2018. Unfortunately, the operation was terminated early without significant tumor resection. Post-operatively, the patient had markedly decreased hearing on the right, right sided facial weakness and imbalance.     He has also had a difficult time adjusting to these deficits. At his last visit, we recommended the patient see Demetrius Hernandez of [facility] psychology. He states that he has established care with psychiatry and started anti-depressants with the guidance of his internist.     However, the patient has not received a hearing aid yet. He does also express some concern about his right eye closing incompletely.     Otherwise, the patient denies any significant interim change since the last visit in 03/2019.     Physical exam:   /70 (BP Location: Left arm, Patient Position: Left side)   Pulse (!) 47   Ht 1.778 m (5' 10\")   Wt 71.7 kg (158 lb)   SpO2 97%   BMI 22.67 kg/m      General: Awake and alert and in no acute distress.  Pulm: Breathing comfortably on room air  CN: face is for the most part symmetric at rest. with activation, there is asymmetry in brow and with smile with deficits on the right. Eye does close completely, but asymetrically. Unable to completely puff cheek on the right. House Brackmann 2. Absent hearing on the right. Tongue protrusion symmetric. No gross dysarthria. Extraocular muscles intact.  Shoulder shrug symmetric    Coordination: Intact finger-nose-finger bilaterally.   Motor: No pronator drift. Good muscle bulk throughout. 5 out of 5 strength in bilateral upper and lower extremities    Imaging:  MRI brain 4/2019: stable petrous ridge meningioma at 1.5 x 1.4 x 1.1 cm.     Assessment:  Petrous ridge meningioma, stable lesion     Plan:  - at this time, would hold off on radiation given patient's continued " "recovery but discussed this would be an option in the future   - continued ophthalmology evaluation in July  - follow-up with repeat MRI brain in 1 year  - continued psychiatry evaluation and treatment   - repeat audiology referral for evaluation of appropriateness for hearing aid      Patient seen and discussed with MD Kike Rubio \"Ho\" MD Gloria, Neurosurgery, PGY-2  I have personally examined the patient, reviewed the imaging and reviewed and edited the resident's note and agree with the plan of care. My comments have been separately dictated. - Azeem Galvin MD      "

## 2019-04-10 NOTE — DISCHARGE INSTRUCTIONS
MRI Contrast Discharge Instructions    The IV contrast you received today will pass out of your body in your  urine. This will happen in the next 24 hours. You will not feel this process.  Your urine will not change color.    Drink at least 4 extra glasses of water or juice today (unless your doctor  has restricted your fluids). This reduces the stress on your kidneys.  You may take your regular medicines.    If you are on dialysis: It is best to have dialysis today.    If you have a reaction: Most reactions happen right away. If you have  any new symptoms after leaving the hospital (such as hives or swelling),  call your hospital at the correct number below. Or call your family doctor.  If you have breathing distress or wheezing, call 911.    Special instructions: ***    I have read and understand the above information.    Signature:______________________________________ Date:___________    Staff:__________________________________________ Date:___________     Time:__________    [facility] Departments:    ___[place]: [phone]  ___[facility]: [phone]  ___[place]: [phone] ___[facility]: [phone]  ___[facility]: [phone]  ___[facility]: [phone]  ___[phone]  ___[facility]: [phone]  ___[phone]

## 2019-04-10 NOTE — PATIENT INSTRUCTIONS
Follow up in one year with Dr. Chapo Ellison MD Neurosurgery   With MRI prior to appointment     Referhal to Audiology by Dr. Galvin. We will call you and coordinate scheduling the appointment.

## 2019-04-10 NOTE — NURSING NOTE
Chief Complaint   Patient presents with     RECHECK     1 YEAR FOLLOW UP MENINGIOMA       Montserrat Anderson MA

## 2019-04-10 NOTE — LETTER
"4/10/2019       RE: Charles Santos  [address]  [place] [address]     Dear Colleague,    Thank you for referring your patient, Charles Santos, to the [facility] NEUROSURGERY at [facility]. Please see a copy of my visit note below.    4/10/2019     SUBJECTIVE:   Mr. Charles Santos is a 60 yo male with a history of a right petrous ridge meningioma and HIV.    An attempted tumor resection was performed on 04/2018. Unfortunately, the operation was terminated early without significant tumor resection. Post-operatively, the patient had markedly decreased hearing on the right, right sided facial weakness and imbalance.     He has also had a difficult time adjusting to these deficits. At his last visit, we recommended the patient see Demetrius Hernandez of [facility] psychology. He states that he has established care with psychiatry and started anti-depressants with the guidance of his internist.     However, the patient has not received a hearing aid yet. He does also express some concern about his right eye closing incompletely.     Otherwise, the patient denies any significant interim change since the last visit in 03/2019.     Physical exam:   /70 (BP Location: Left arm, Patient Position: Left side)   Pulse (!) 47   Ht 1.778 m (5' 10\")   Wt 71.7 kg (158 lb)   SpO2 97%   BMI 22.67 kg/m       General: Awake and alert and in no acute distress.  Pulm: Breathing comfortably on room air  CN: face is for the most part symmetric at rest. with activation, there is asymmetry in brow and with smile with deficits on the right. Eye does close completely, but asymetrically. Unable to completely puff cheek on the right. House Brackmann 2. Absent hearing on the right. Tongue protrusion symmetric. No gross dysarthria. Extraocular muscles intact.  Shoulder shrug symmetric    Coordination: Intact finger-nose-finger bilaterally.   Motor: No pronator drift. Good muscle bulk " "throughout. 5 out of 5 strength in bilateral upper and lower extremities    Imaging:  MRI brain 4/2019: stable petrous ridge meningioma at 1.5 x 1.4 x 1.1 cm.     Assessment:  Petrous ridge meningioma, stable lesion     Plan:  - at this time, would hold off on radiation given patient's continued recovery but discussed this would be an option in the future   - continued ophthalmology evaluation in July  - follow-up with repeat MRI brain in 1 year  - continued psychiatry evaluation and treatment   - repeat audiology referral for evaluation of appropriateness for hearing aid      Patient seen and discussed with MD Kike Rubio \"Ho\" MD Gloria, Neurosurgery, PGY-2  I have personally examined the patient, reviewed the imaging and reviewed and edited the resident's note and agree with the plan of care. My comments have been separately dictated. - Azeem Galvin MD      Service Date: 04/10/2019      Mr. Santos was seen today for his first annual followup scan after a biopsy of the petrous ridge meningioma.  A detailed summary of today's visit has been entered into the record by my resident, Ho Castro, which I have reviewed, amended and incorporate into my own.      Mr. Santos has not had any new signs or symptoms develop since we last saw him in March.  He has resumed therapy for his depression with his primary care physician and psychiatrist.  He has been unable to complete the audiology evaluation we recommended for CROS hearing aid.      His facial function looks to me to be similar to the way it was in March.  He has very mild asymmetry at rest, motion in all divisions and complete eye closure.  I would call this a significant House-Brackmann class II facial nerve function.      We reviewed the MRI scan and it shows no significant change in size of the tumor, as expected.      ASSESSMENT:  Stable tumor of the left petrous ridge.      I again recommended the audiology evaluation and we are making those " arrangements again.  I have recommended a followup scan in 1 year.      We again discussed stereotactic radiosurgery for the purpose of preventing further growth, but I indicated to him that I would not recommend that until optimal facial nerve function has returned.      He is scheduled to see the neuro-ophthalmologist this summer and by that time it may be appropriate to consider some oculoplastic intervention if the facial function seems stable and there is an important correction to be made.      ALLY TURNER MD       D: 04/10/2019   T: 2019   MT: LG      Name:     JESSIE OSWALD   MRN:      [id number]        Account:      [id number]   :      1960           Service Date: 04/10/2019      Document: [id number]

## 2019-04-11 NOTE — PROGRESS NOTES
Service Date: 04/10/2019      Mr. Oswald was seen today for his first annual followup scan after a biopsy of the petrous ridge meningioma.  A detailed summary of today's visit has been entered into the record by my resident, Ho Castro, which I have reviewed, amended and incorporate into my own.      Mr. Oswald has not had any new signs or symptoms develop since we last saw him in March.  He has resumed therapy for his depression with his primary care physician and psychiatrist.  He has been unable to complete the audiology evaluation we recommended for CROS hearing aid.      His facial function looks to me to be similar to the way it was in March.  He has very mild asymmetry at rest, motion in all divisions and complete eye closure.  I would call this a significant House-Brackmann class II facial nerve function.      We reviewed the MRI scan and it shows no significant change in size of the tumor, as expected.      ASSESSMENT:  Stable tumor of the left petrous ridge.      I again recommended the audiology evaluation and we are making those arrangements again.  I have recommended a followup scan in 1 year.      We again discussed stereotactic radiosurgery for the purpose of preventing further growth, but I indicated to him that I would not recommend that until optimal facial nerve function has returned.      He is scheduled to see the neuro-ophthalmologist this summer and by that time it may be appropriate to consider some oculoplastic intervention if the facial function seems stable and there is an important correction to be made.         ALLY TURNER MD             D: 04/10/2019   T: 2019   MT: LG      Name:     JESSIE OSWALD   MRN:      [id number]        Account:      [id number]   :      1960           Service Date: 04/10/2019      Document: [id number]

## 2019-04-16 ENCOUNTER — OFFICE (OUTPATIENT)
Dept: URBAN - NONMETROPOLITAN AREA CLINIC 13 | Facility: CLINIC | Age: 59
End: 2019-04-16

## 2019-04-16 VITALS
DIASTOLIC BLOOD PRESSURE: 57 MMHG | HEART RATE: 76 BPM | OXYGEN SATURATION: 92 % | SYSTOLIC BLOOD PRESSURE: 112 MMHG | HEIGHT: 70 IN | WEIGHT: 262 LBS

## 2019-04-16 DIAGNOSIS — B18.2 CHRONIC VIRAL HEPATITIS C: ICD-10-CM

## 2019-04-16 DIAGNOSIS — K76.0 FATTY (CHANGE OF) LIVER, NOT ELSEWHERE CLASSIFIED: ICD-10-CM

## 2019-04-16 DIAGNOSIS — K21.9 GASTRO-ESOPHAGEAL REFLUX DISEASE WITHOUT ESOPHAGITIS: ICD-10-CM

## 2019-04-16 DIAGNOSIS — Z79.899 OTHER LONG TERM (CURRENT) DRUG THERAPY: ICD-10-CM

## 2019-04-16 DIAGNOSIS — K59.00 CONSTIPATION, UNSPECIFIED: ICD-10-CM

## 2019-04-16 PROCEDURE — 99213 OFFICE O/P EST LOW 20 MIN: CPT

## 2019-05-14 ENCOUNTER — TELEPHONE (OUTPATIENT)
Dept: OPHTHALMOLOGY | Facility: CLINIC | Age: 59
End: 2019-05-14

## 2019-05-14 ENCOUNTER — OFFICE VISIT (OUTPATIENT)
Dept: OPHTHALMOLOGY | Facility: CLINIC | Age: 59
End: 2019-05-14
Payer: MEDICARE

## 2019-05-14 DIAGNOSIS — H16.211 EXPOSURE KERATOCONJUNCTIVITIS OF EYE, RIGHT: ICD-10-CM

## 2019-05-14 DIAGNOSIS — H49.21 SIXTH NERVE PALSY OF RIGHT EYE: Primary | ICD-10-CM

## 2019-05-14 DIAGNOSIS — G51.0 SEVENTH NERVE PALSY: ICD-10-CM

## 2019-05-14 DIAGNOSIS — R25.8 SYNKINESIS: ICD-10-CM

## 2019-05-14 DIAGNOSIS — H02.539 EYELID RETRACTION OR LAG: ICD-10-CM

## 2019-05-14 PROBLEM — M85.80 OSTEOPENIA: Status: ACTIVE | Noted: 2019-02-21

## 2019-05-14 PROBLEM — F32.A DEPRESSION: Status: ACTIVE | Noted: 2019-02-21

## 2019-05-14 ASSESSMENT — VISUAL ACUITY
OD_CC: 20/20
METHOD: SNELLEN - LINEAR
OS_CC: 20/20

## 2019-05-14 ASSESSMENT — TONOMETRY
OS_IOP_MMHG: 10
IOP_METHOD: ICARE
OD_IOP_MMHG: 12

## 2019-05-14 ASSESSMENT — EXTERNAL EXAM - LEFT EYE: OS_EXAM: NORMAL

## 2019-05-14 ASSESSMENT — CONF VISUAL FIELD
OD_NORMAL: 1
METHOD: COUNTING FINGERS
OS_NORMAL: 1

## 2019-05-14 NOTE — NURSING NOTE
Chief Complaints and History of Present Illnesses   Patient presents with     Follow Up     Chief Complaint(s) and History of Present Illness(es)     Follow Up     Laterality: both eyes    Course: stable    Associated symptoms: dryness    Treatments tried: eye drops and artificial tears    Pain scale: 0/10              Comments     Sixth nerve palsy of right eye  and Seventh nerve palsy.     Pt states still having dryness, feels his eyes are closing more. No pain. AT as needed.  Alix AYALA 10:05 AM May 14, 2019

## 2019-05-14 NOTE — TELEPHONE ENCOUNTER
Spoke with patient to schedule surgery with Dr. Radha Guerra    Surgery was scheduled on 06/13 at [facility]  Patient will have H&P at [place], SLP   Post-Op visit was scheduled on 06/18  Patient is aware a / is needed day of surgery.   Surgery packet was mailed, patient has my direct contact information for any further questions.

## 2019-05-14 NOTE — Clinical Note
He wants his lower lid retraction fixed, which I will do. Also getting synkinesis from his 7th nerve palsy. I told him to f/u with you for consideration of botox.

## 2019-05-14 NOTE — PROGRESS NOTES
Chief Complaint(s) and History of Present Illness(es)     Follow Up     Laterality: both eyes    Course: stable    Associated symptoms: dryness    Treatments tried: eye drops and artificial tears    Pain scale: 0/10              Comments     Sixth nerve palsy of right eye  and Seventh nerve palsy.     Pt states still having dryness, feels his eyes are closing more. No pain.   AT as needed.  Alix Stephens JAMIE 10:05 AM May 14, 2019     Here for f/u right 6th and 7th nerve palsy following meningioma resection 4/12/18.   Uses art tears twice a day. Tearing intermittently. He does continue to have eye irritation on the right.          Assessment & Plan     Charles Santos is a 59 year old male with the following diagnoses:   1. Sixth nerve palsy of right eye    2. Seventh nerve palsy    3. Synkinesis    4. Eyelid retraction or lag    5. 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     Also may benefit from botulinum toxin for facial synkinesis, discussed Dr. Villeda can provide that along with management of diplopia.          Attending Physician Attestation:  Complete documentation of historical and exam elements from today's encounter can be found in the full encounter summary report (not reduplicated in this progress note).  I personally obtained the chief complaint(s) and history of present illness.  I confirmed and edited as necessary the review of systems, past medical/surgical history, family history, social history, and examination findings as documented by others; and I examined the patient myself.  I personally reviewed the relevant tests, images, and reports as documented above.  I formulated and edited as necessary the assessment and plan and discussed the findings and management plan with the patient and family. - Radha Guerra MD

## 2019-06-12 ENCOUNTER — ANESTHESIA EVENT (OUTPATIENT)
Dept: SURGERY | Facility: AMBULATORY SURGERY CENTER | Age: 59
End: 2019-06-12

## 2019-06-13 ENCOUNTER — HOSPITAL ENCOUNTER (OUTPATIENT)
Facility: AMBULATORY SURGERY CENTER | Age: 59
End: 2019-06-13
Attending: OPHTHALMOLOGY
Payer: MEDICARE

## 2019-06-13 ENCOUNTER — ANESTHESIA (OUTPATIENT)
Dept: SURGERY | Facility: AMBULATORY SURGERY CENTER | Age: 59
End: 2019-06-13

## 2019-06-13 VITALS
OXYGEN SATURATION: 94 % | HEART RATE: 51 BPM | RESPIRATION RATE: 16 BRPM | BODY MASS INDEX: 22.62 KG/M2 | SYSTOLIC BLOOD PRESSURE: 109 MMHG | DIASTOLIC BLOOD PRESSURE: 62 MMHG | TEMPERATURE: 97.6 F | WEIGHT: 158 LBS | HEIGHT: 70 IN

## 2019-06-13 DIAGNOSIS — Z98.890 POSTOPERATIVE EYE STATE: Primary | ICD-10-CM

## 2019-06-13 DEVICE — GRAFT ALLODERM 2X4CM THIN CHARGE PER SQ CM= 8 UNITS: Type: IMPLANTABLE DEVICE | Site: EYELID | Status: FUNCTIONAL

## 2019-06-13 RX ORDER — GABAPENTIN 300 MG/1
300 CAPSULE ORAL ONCE
Status: COMPLETED | OUTPATIENT
Start: 2019-06-13 | End: 2019-06-13

## 2019-06-13 RX ORDER — FENTANYL CITRATE 50 UG/ML
25-50 INJECTION, SOLUTION INTRAMUSCULAR; INTRAVENOUS
Status: DISCONTINUED | OUTPATIENT
Start: 2019-06-13 | End: 2019-06-13 | Stop reason: HOSPADM

## 2019-06-13 RX ORDER — ONDANSETRON 4 MG/1
4 TABLET, ORALLY DISINTEGRATING ORAL EVERY 30 MIN PRN
Status: DISCONTINUED | OUTPATIENT
Start: 2019-06-13 | End: 2019-06-14 | Stop reason: HOSPADM

## 2019-06-13 RX ORDER — ONDANSETRON 2 MG/ML
INJECTION INTRAMUSCULAR; INTRAVENOUS PRN
Status: DISCONTINUED | OUTPATIENT
Start: 2019-06-13 | End: 2019-06-13

## 2019-06-13 RX ORDER — DEXAMETHASONE SODIUM PHOSPHATE 4 MG/ML
4 INJECTION, SOLUTION INTRA-ARTICULAR; INTRALESIONAL; INTRAMUSCULAR; INTRAVENOUS; SOFT TISSUE EVERY 10 MIN PRN
Status: DISCONTINUED | OUTPATIENT
Start: 2019-06-13 | End: 2019-06-14 | Stop reason: HOSPADM

## 2019-06-13 RX ORDER — SODIUM CHLORIDE, SODIUM LACTATE, POTASSIUM CHLORIDE, CALCIUM CHLORIDE 600; 310; 30; 20 MG/100ML; MG/100ML; MG/100ML; MG/100ML
INJECTION, SOLUTION INTRAVENOUS CONTINUOUS
Status: DISCONTINUED | OUTPATIENT
Start: 2019-06-13 | End: 2019-06-13 | Stop reason: HOSPADM

## 2019-06-13 RX ORDER — MEPERIDINE HYDROCHLORIDE 25 MG/ML
12.5 INJECTION INTRAMUSCULAR; INTRAVENOUS; SUBCUTANEOUS
Status: DISCONTINUED | OUTPATIENT
Start: 2019-06-13 | End: 2019-06-14 | Stop reason: HOSPADM

## 2019-06-13 RX ORDER — PROPOFOL 10 MG/ML
INJECTION, EMULSION INTRAVENOUS PRN
Status: DISCONTINUED | OUTPATIENT
Start: 2019-06-13 | End: 2019-06-13

## 2019-06-13 RX ORDER — ERYTHROMYCIN 5 MG/G
OINTMENT OPHTHALMIC PRN
Status: DISCONTINUED | OUTPATIENT
Start: 2019-06-13 | End: 2019-06-13 | Stop reason: HOSPADM

## 2019-06-13 RX ORDER — LIDOCAINE 40 MG/G
CREAM TOPICAL
Status: DISCONTINUED | OUTPATIENT
Start: 2019-06-13 | End: 2019-06-13 | Stop reason: HOSPADM

## 2019-06-13 RX ORDER — SODIUM CHLORIDE, SODIUM LACTATE, POTASSIUM CHLORIDE, CALCIUM CHLORIDE 600; 310; 30; 20 MG/100ML; MG/100ML; MG/100ML; MG/100ML
INJECTION, SOLUTION INTRAVENOUS CONTINUOUS
Status: DISCONTINUED | OUTPATIENT
Start: 2019-06-13 | End: 2019-06-14 | Stop reason: HOSPADM

## 2019-06-13 RX ORDER — ALBUTEROL SULFATE 0.83 MG/ML
2.5 SOLUTION RESPIRATORY (INHALATION) EVERY 4 HOURS PRN
Status: DISCONTINUED | OUTPATIENT
Start: 2019-06-13 | End: 2019-06-13 | Stop reason: HOSPADM

## 2019-06-13 RX ORDER — LABETALOL 20 MG/4 ML (5 MG/ML) INTRAVENOUS SYRINGE
10
Status: DISCONTINUED | OUTPATIENT
Start: 2019-06-13 | End: 2019-06-13 | Stop reason: HOSPADM

## 2019-06-13 RX ORDER — ACETAMINOPHEN 325 MG/1
975 TABLET ORAL ONCE
Status: COMPLETED | OUTPATIENT
Start: 2019-06-13 | End: 2019-06-13

## 2019-06-13 RX ORDER — HYDROMORPHONE HYDROCHLORIDE 1 MG/ML
.3-.5 INJECTION, SOLUTION INTRAMUSCULAR; INTRAVENOUS; SUBCUTANEOUS EVERY 10 MIN PRN
Status: DISCONTINUED | OUTPATIENT
Start: 2019-06-13 | End: 2019-06-14 | Stop reason: HOSPADM

## 2019-06-13 RX ORDER — ERYTHROMYCIN 5 MG/G
OINTMENT OPHTHALMIC
Qty: 3.5 G | Refills: 0 | Status: SHIPPED | OUTPATIENT
Start: 2019-06-13 | End: 2019-06-18

## 2019-06-13 RX ORDER — HYDRALAZINE HYDROCHLORIDE 20 MG/ML
2.5-5 INJECTION INTRAMUSCULAR; INTRAVENOUS EVERY 10 MIN PRN
Status: DISCONTINUED | OUTPATIENT
Start: 2019-06-13 | End: 2019-06-13 | Stop reason: HOSPADM

## 2019-06-13 RX ORDER — ONDANSETRON 2 MG/ML
4 INJECTION INTRAMUSCULAR; INTRAVENOUS EVERY 30 MIN PRN
Status: DISCONTINUED | OUTPATIENT
Start: 2019-06-13 | End: 2019-06-14 | Stop reason: HOSPADM

## 2019-06-13 RX ORDER — OXYCODONE HYDROCHLORIDE 5 MG/1
5-10 TABLET ORAL EVERY 4 HOURS PRN
Status: DISCONTINUED | OUTPATIENT
Start: 2019-06-13 | End: 2019-06-14 | Stop reason: HOSPADM

## 2019-06-13 RX ORDER — NALOXONE HYDROCHLORIDE 0.4 MG/ML
.1-.4 INJECTION, SOLUTION INTRAMUSCULAR; INTRAVENOUS; SUBCUTANEOUS
Status: DISCONTINUED | OUTPATIENT
Start: 2019-06-13 | End: 2019-06-14 | Stop reason: HOSPADM

## 2019-06-13 RX ORDER — FENTANYL CITRATE 50 UG/ML
25-50 INJECTION, SOLUTION INTRAMUSCULAR; INTRAVENOUS
Status: DISCONTINUED | OUTPATIENT
Start: 2019-06-13 | End: 2019-06-14 | Stop reason: HOSPADM

## 2019-06-13 RX ORDER — TETRACAINE HYDROCHLORIDE 5 MG/ML
SOLUTION OPHTHALMIC PRN
Status: DISCONTINUED | OUTPATIENT
Start: 2019-06-13 | End: 2019-06-13 | Stop reason: HOSPADM

## 2019-06-13 RX ADMIN — SODIUM CHLORIDE, SODIUM LACTATE, POTASSIUM CHLORIDE, CALCIUM CHLORIDE: 600; 310; 30; 20 INJECTION, SOLUTION INTRAVENOUS at 11:12

## 2019-06-13 RX ADMIN — PROPOFOL 80 MG: 10 INJECTION, EMULSION INTRAVENOUS at 12:59

## 2019-06-13 RX ADMIN — ACETAMINOPHEN 975 MG: 325 TABLET ORAL at 11:12

## 2019-06-13 RX ADMIN — ONDANSETRON 4 MG: 2 INJECTION INTRAMUSCULAR; INTRAVENOUS at 12:59

## 2019-06-13 RX ADMIN — GABAPENTIN 300 MG: 300 CAPSULE ORAL at 11:12

## 2019-06-13 ASSESSMENT — COPD QUESTIONNAIRES: COPD: 1

## 2019-06-13 ASSESSMENT — MIFFLIN-ST. JEOR: SCORE: 1524.99

## 2019-06-13 ASSESSMENT — LIFESTYLE VARIABLES: TOBACCO_USE: 1

## 2019-06-13 NOTE — ANESTHESIA POSTPROCEDURE EVALUATION
Anesthesia POST Procedure Evaluation    Patient: Charles Santos   MRN:     [id number] Gender:   male   Age:    59 year old :      1960        Preoperative Diagnosis: Eyelid Retraction and Facial Paralysis   Procedure(s):  Right Lower Eyelid Retraction Repair with Thin Alloderm, Temporary Tarsorrhaphy  Temporary Tarsorrhaphy   Postop Comments: No value filed.       Anesthesia Type:  MAC  No value filed.    Reportable Event: NO     PAIN: Uncomplicated   Sign Out status: Comfortable, Well controlled pain     PONV: No PONV   Sign Out status:  No Nausea or Vomiting     Neuro/Psych: Uneventful perioperative course   Sign Out Status: Preoperative baseline; Age appropriate mentation     Airway/Resp.: Uneventful perioperative course   Sign Out Status: Non labored breathing, age appropriate RR; Resp. Status within EXPECTED Parameters     CV: Uneventful perioperative course   Sign Out status: Appropriate BP and perfusion indices; Appropriate HR/Rhythm     Disposition:   Sign Out in:  PACU  Disposition:  Phase II; Home  Recovery Course: Uneventful  Follow-Up: Not required           Last Anesthesia Record Vitals:  CRNA VITALS  2019 1304 - 2019 1404      2019             Resp Rate (set):  10          Last PACU Vitals:  Vitals Value Taken Time   BP     Temp     Pulse     Resp     SpO2     Temp src     NIBP 103/59 2019  1:25 PM   Pulse 50 2019  1:29 PM   SpO2 93 % 2019  1:29 PM   Resp     Temp     Ht Rate 51 2019  1:29 PM   Temp 2           Electronically Signed By: Alcides Arechiga MD, 2019, 2:26 PM

## 2019-06-13 NOTE — ANESTHESIA CARE TRANSFER NOTE
Patient: Charles Santos    Procedure(s):  Right Lower Eyelid Retraction Repair with Thin Alloderm, Temporary Tarsorrhaphy  Temporary Tarsorrhaphy    Diagnosis: Eyelid Retraction and Facial Paralysis  Diagnosis Additional Information: No value filed.    Anesthesia Type:   No value filed.     Note:  Airway :Room Air  Patient transferred to:Phase II  Comments: Uneventful transport to Phase 2; IV patent; Pt responds appropriately to command; Pt comfortable; VSS: Report to RNHandoff Report: Identifed the Patient, Identified the Reponsible Provider, Reviewed the pertinent medical history, Discussed the surgical course, Reviewed Intra-OP anesthesia mangement and issues during anesthesia, Set expectations for post-procedure period and Allowed opportunity for questions and acknowledgement of understanding      Vitals: (Last set prior to Anesthesia Care Transfer)    CRNA VITALS  6/13/2019 1304 - 6/13/2019 1334      6/13/2019             Resp Rate (set):  10                Electronically Signed By: TERESA CANDELARIO CRNA  June 13, 2019  1:34 PM

## 2019-06-13 NOTE — DISCHARGE INSTRUCTIONS
Post-operative Instructions  Ophthalmic Plastic and Reconstructive Surgery    Radha Guerra M.D.     All instructions apply to the operated eye(s) or eyelid(s).    Wound care and personal care  ? If a patch or bandage has been placed, please leave this in place until seen by your physician. Ensure that the bandage does not get wet when you take a shower.  ? You may shower or wash your hair the day after surgery. Do not go swimming for at least 2 weeks to prevent contamination of your wounds.  ? Do not apply make-up to the eyes or eyelids for 2 weeks after surgery.  ? Expect some swelling, bruising, black eye (even into the lower eyelids and cheeks). Also expect serum caking, crusting and discharge from the eye and/or incisions. A small amount of surface bleeding, and depending on the type of surgery, bleeding from the inside of the eyelid, is normal for the first 48 hours.  ? Avoid straining, bending at the waist, or lifting more than 15 pounds for 10 days. Activities that raise your blood pressure can worsen swelling, cause bleeding, and breaking of sutures. Like wise, sleeping with your head slightly elevated for the first several days can help swelling resolve more quickly.   ? Do continue to ambulate (walk) as you normally would - being sedentary after surgery can cause blood clots.   ? Your eye(s) and eyelid(s) may be painful and tender. This is normal after surgery.      Contact information and follow-up  ? Return to the Eye Clinic for a follow-up appointment with your physician as scheduled. If no appointment has been scheduled:   - [facility] eye clinic: [phone] for an appointment with Dr. Guerra within 1 to 2 weeks from your date of surgery.   -  [facility] eye clinic: [phone] for an appointment with Dr. Guerra within 1 to 2 weeks from your date of surgery.     ? For severe pain, bleeding, or loss of vision, call the [facility] at [phone] or [facility] at [phone].     After hours or on weekends and holidays, call [phone] and ask to speak with the ophthalmologist on call.    An on call person can be reached after hours for concerns. The on call doctor should not call in medication refill requests after hours or on weekends, so please plan accordingly. An effort has been made to provide adequate pain medications following every surgery, and refills will not be provided in most instances. Narcotic pain medications cannot be called in.     Activity restrictions and driving  ? Avoid heavy lifting, bending, exercise or strenuous activity for 1 week after surgery.  You may resume other activities and return to work as tolerated.  ? You may not resume driving if you are using narcotic pain medications (such as Norco, Percocet, Tylenol #3).    Medications  ? Restart all regular home medications and eye drops. If you take Plavix or  Aspirin on a regular basis, wait for 72 hours after your surgery before restarting these in order to decrease the risk of bleeding complications.  It is ok to start Xarelto June 14, 2019.   ? Avoid aspirin and aspirin-like medications (Motrin, Aleve, Ibuprofen, [place] etc) for 72 hours to reduce the risk of bleeding. You may take Tylenol (acetaminophen) for pain.  ? In addition to your home medications, take the following post-operative medications as prescribed by your physician.    ? Apply antibiotic ointment to all sutures three times a day, and into the operated eye(s) at night.  ? If your eye feels dry or irritated you can try to get ointment into the eye around the dressingSum[facility]  Home Care Following Anesthesia  For 24 hours after surgery:  1. Get plenty of rest.  A responsible adult must stay with you for at least 24 hours after you leave the surgery center.  2. Do not drive or use heavy equipment.  If you have weakness or tingling,  don't drive or use heavy equipment until this feeling goes away.   3. Do not drink alcohol.   4. Avoid strenuous or risky activities.  Ask for help when climbing stairs.  5. You may feel lightheaded.  IF so, sit for a few minutes before standing.  Have someone help you get up.   6. If you have nausea (feel sick to your stomach): Drink only clear liquids such as apple juice, ginger ale, broth or 7-Up.  Rest may also help.  Be sure to drink enough fluids.  Move to a regular diet as you feel able.   7. You may have a slight fever.  Call the doctor if your fever is over 100 F (37.7 C) (taken under the tongue) or lasts longer than 24 hours.  8. You may have a dry mouth, a sore throat, muscle aches or trouble sleeping. These should go away after 24 hours.  9. Do not make important or legal decisions.               Tips for taking pain medications  To get the best pain relief possible, remember these points:    Take pain medications as directed, before pain becomes severe.    Pain medication can upset your stomach: taking it with food may help.    Constipation is a common side effect of pain medication. Drink plenty of  fluids.    Eat foods high in fiber. Take a stool softener if recommended by your doctor or pharmacist.    Do not drink alcohol, drive or operate machinery while taking pain medications.    Ask about other ways to control pain, such as with heat, ice or relaxation.    Tylenol/Acetaminophen Consumption  To help encourage the safe use of acetaminophen, the makers of TYLENOL  have lowered the maximum daily dose for single-ingredient Extra Strength TYLENOL  (acetaminophen) products sold in the [place] from 8 pills per day (4,000 mg) to 6 pills per day (3,000 mg). The dosing interval has also changed from 2 pills every 4-6 hours to 2 pills every 6 hours.    If you feel your pain relief is insufficient, you may take Tylenol/Acetaminophen in addition to your narcotic pain medication.     Be careful not to exceed 3,000  mg of Tylenol/Acetaminophen in a 24 hour period from all sources.    If you are taking extra strength Tylenol/acetaminophen (500 mg), the maximum dose is 6 tablets in 24 hours.    If you are taking regular strength acetaminophen (325 mg), the maximum dose is 9 tablets in 24 hours.    Call a doctor for any of the followin. Signs of infection (fever, growing tenderness at the surgery site, a large amount of drainage or bleeding, severe pain, foul-smelling drainage, redness, swelling).  2. It has been over 8 to 10 hours since surgery and you are still not able to urinate (pass water).  3. Headache for over 24 hours.  4. Numbness, tingling or weakness the day after surgery (if you had spinal anesthesia).  Your doctor is:       Dr. Radha Guerra, Ophthalmology: [phone]               Or dial [phone] and ask for the resident on call for:  Ophthalmology  For emergency care, call the:  [place] [facility]:  [phone] (TTY for hearing impaired: [phone])

## 2019-06-13 NOTE — OP NOTE
PREOPERATIVE DIAGNOSIS: Right lower eyelid retraction.   POSTOPERATIVE DIAGNOSIS: Right  lower eyelid retraction.   PROCEDURE PERFORMED: Right lower eyelid retraction repair with conjunctivoplasty, Alloderm (acellular human dermal matrix) graft, lateral canthopexy and temporary Paris suture tarsorrhaphy.   SURGEON: Radha Guerra MD.   ASSISTANTS: None  ANESTHESIA: Monitored with local infiltration of a 50/50 mixture of 2% lidocaine with epinephrine and 0.5% Marcaine.   COMPLICATIONS: None.   ESTIMATED BLOOD LOSS: Less than 5 mL.   HISTORY OF PRESENT ILLNESS: Charles Santos  presented with right lower lid retraction leading to exposure keratitis. After the risks, benefits and alternatives to the proposed procedure were explained, informed consent was obtained.   DESCRIPTION OF PROCEDURE: Charles Santos was brought to the operating room and placed supine on the operating table.  The right  lower lid lateral canthus, upper lid and brow were infiltrated with local anesthetic. Anesthesia was infiltrated. The area prepped and draped in the typical sterile ophthalmic fashion. Attention was directed to the right side. Lateral canthal incision was made with a 15-blade and dissection carried down through the orbicularis with monopolar cautery. Lateral canthotomy and inferior cantholysis was performed. Transconjunctival incision was performed with cautery and Jeimy scissors at the inferior tarsal border releasing the lower eyelid retractors and conjunctiva. The Alloderm acellular dermis graft was soaked in gentamicin solution, trimmed to fit in the lower lid conjunctival defect and sutured in place with running 6-0 plain gut sutures, securing the graft to the inferior tarsal border superiorly and inferiorly to the inferior conjunctival edge. The lateral tarsus was reattached to the lateral orbital rim periosteum with a double-armed 5-0 vicryl suture in a horizontal mattress fashion lateral canthopexy. Lateral  canthus was reconstructed with a 5-0 Vicryl suture. Skin was closed with interrupted 6-0 plain gut sutures. A 4-0 Prolene suture was placed in a Frost suture technique through the lower lid, upper lid and brow and tied over a Telfa bolster. Charles Santos  tolerated the procedure well and was taken to recovery room in stable condition.     Radha Guerra MD

## 2019-06-18 ENCOUNTER — OFFICE VISIT (OUTPATIENT)
Dept: OPHTHALMOLOGY | Facility: CLINIC | Age: 59
End: 2019-06-18
Payer: MEDICARE

## 2019-06-18 DIAGNOSIS — Z98.890 POSTOPERATIVE EYE STATE: ICD-10-CM

## 2019-06-18 RX ORDER — ERYTHROMYCIN 5 MG/G
OINTMENT OPHTHALMIC
Qty: 3.5 G | Refills: 3 | Status: SHIPPED | OUTPATIENT
Start: 2019-06-18 | End: 2019-11-04

## 2019-06-18 ASSESSMENT — TONOMETRY
IOP_METHOD: ICARE
OS_IOP_MMHG: 10
OD_IOP_MMHG: TARS

## 2019-06-18 ASSESSMENT — VISUAL ACUITY
METHOD: SNELLEN - LINEAR
OS_CC: 20/20
OD_CC: TARSO
CORRECTION_TYPE: GLASSES

## 2019-06-18 NOTE — NURSING NOTE
Chief Complaints and History of Present Illnesses   Patient presents with     Post Op (Ophthalmology) Right Eye     Chief Complaint(s) and History of Present Illness(es)     Post Op (Ophthalmology) Right Eye     Laterality: right eye    Course: stable    Treatments tried: ointment    Pain scale: 0/10              Comments     S/p Right lower eyelid retraction repair with conjunctivoplasty, Alloderm (acellular human dermal matrix) graft, lateral canthopexy and temporary Paris suture tarsorrhaphy on 6/13/2019.    Pt states healing ok- no pain. Done with ointment.     Alix Stephens COT 8:40 AM June 18, 2019

## 2019-06-18 NOTE — PROGRESS NOTES
Chief Complaint(s) and History of Present Illness(es)     Post Op (Ophthalmology) Right Eye     Laterality: right eye    Course: stable    Treatments tried: ointment    Pain scale: 0/10            Comments     S/p Right lower eyelid retraction repair with conjunctivoplasty, Alloderm   (acellular human dermal matrix) graft, lateral canthopexy and temporary   Paris suture tarsorrhaphy on 6/13/2019.    Pt states healing ok- no pain. Done with ointment.     Alix Stephens COT 8:40 AM June 18, 2019              Charles Santos is status post Right lower lid retraction repair.  Incision(s) healing well.  The lid(s)  is  in excellent position.    I have recommended:  * Continue antibiotic ointment or bland lubricating ointment (eg vaseline or aquaphor) to the incision site at bedtime. Refilled ees.   * Can massage along the incision 2-3 x daily.   * Warm soaks 3-4x daily until all edema and ecchymoses resolve  * Return to clinic in 2 months     Attending Physician Attestation:  Complete documentation of historical and exam elements from today's encounter can be found in the full encounter summary report (not reduplicated in this progress note).  I personally obtained the chief complaint(s) and history of present illness.  I confirmed and edited as necessary the review of systems, past medical/surgical history, family history, social history, and examination findings as documented by others; and I examined the patient myself.  I personally reviewed the relevant tests, images, and reports as documented above.  I formulated and edited as necessary the assessment and plan and discussed the findings and management plan with the patient and family. - Radha Guerra MD     No

## 2019-08-07 ENCOUNTER — OFFICE VISIT (OUTPATIENT)
Dept: OPHTHALMOLOGY | Facility: CLINIC | Age: 59
End: 2019-08-07
Payer: MEDICARE

## 2019-08-07 DIAGNOSIS — G51.0 SEVENTH NERVE PALSY: ICD-10-CM

## 2019-08-07 DIAGNOSIS — R25.8 SYNKINESIS: ICD-10-CM

## 2019-08-07 DIAGNOSIS — G51.8 OTHER DISORDERS OF FACIAL NERVE: Primary | ICD-10-CM

## 2019-08-07 DIAGNOSIS — H49.21 6TH NERVE PALSY, RIGHT: Primary | ICD-10-CM

## 2019-08-07 DIAGNOSIS — G51.9 FACIAL NERVE DISORDER: ICD-10-CM

## 2019-08-07 DIAGNOSIS — H02.539 EYELID RETRACTION OR LAG: ICD-10-CM

## 2019-08-07 ASSESSMENT — REFRACTION_WEARINGRX
OS_AXIS: 180
OD_CYLINDER: +0.50
OS_SPHERE: +1.50
OS_HPRISM: 6 BO
OD_AXIS: 015
OS_ADD: +2.50
OS_CYLINDER: +0.75
OD_HPRISM: 6 BO
OD_SPHERE: +1.00
OD_ADD: +2.50

## 2019-08-07 ASSESSMENT — VISUAL ACUITY
CORRECTION_TYPE: GLASSES
OS_CC: 20/20
METHOD: SNELLEN - LINEAR
OD_CC: 20/20

## 2019-08-07 ASSESSMENT — TONOMETRY
OS_IOP_MMHG: 12
IOP_METHOD: ICARE
OD_IOP_MMHG: 12

## 2019-08-07 ASSESSMENT — CONF VISUAL FIELD
OD_NORMAL: 1
OS_NORMAL: 1

## 2019-08-07 ASSESSMENT — EXTERNAL EXAM - LEFT EYE: OS_EXAM: NORMAL

## 2019-08-07 NOTE — NURSING NOTE
Chief Complaints and History of Present Illnesses   Patient presents with     Diplopia Follow-Up     Chief Complaint(s) and History of Present Illness(es)     Diplopia Follow-Up               Comments     Charles Santos is a 58 year old male with the following diagnoses:   1. Sixth nerve palsy of right eye   2. Seventh nerve palsy     s/p Right lower eyelid retraction repair with conjunctivoplasty, Alloderm   (acellular human dermal matrix) graft, lateral canthopexy and temporary   Paris suture tarsorrhaphy on 6/13/2019.       Updated glasses since the last visit. Wearing 12 DAYNE split. No complaints of diplopia with glasses on.   Patient interested in getting botox injections today.       Geovanna ALMAGUER 8:14 AM August 7, 2019

## 2019-08-07 NOTE — PROGRESS NOTES
Assessment & Plan     Charles Santos is a 58 year old male with the following diagnoses:   1. 6th nerve palsy, right    2. Seventh nerve palsy    3. Eyelid retraction or lag    4. 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  His clinical course was complicated by a right facial droop immediately after surgery.He is also s/p right lower eyelid retraction repair with conjunctivoplasty, Alloderm (acellular human dermal matrix) graft, lateral canthopexy and temporary Frost suture tarsorrhaphy on 6/13/2019 Overall, it seems to have improved.  He notes that his face feels like it is being pulled up after his second surgery. He is using artificial tears as needed. He is using erythromycin ointment at least once a day and at bedtime. He still has diplopia worse in right gaze.     His visual acuity is 20/20 RIGHT eye, 20/20 LEFT eye.  He has no ocular misalignment in his prism glasses. He has significant lag of the RIGHT eye and corneal dryness.       It is my impression that Charles has iatrogenic sixth nerve palsy s/p pontomedullary junction removal of a meningioma with measurements that appear stable. He would like to be seen for strabismus surgery evaluation with Dr. Salazar. He is wearing a total of 12 DAYNE prisms in his glasses and would like to not have as thick of glasses. He has significant facial weakness on the right side, but his corneas look symmetric in both eyes. He has facial synkinesis RIGHT eye.  He would like to try Botox.  Will arrange for prior authorization.                Attending Physician Attestation:  Complete documentation of historical and exam elements from today's encounter can be found in the full encounter summary report (not reduplicated in this progress note).  I personally obtained the chief complaint(s) and history of present illness.  I confirmed and  edited as necessary the review of systems, past medical/surgical history, family history, social history, and examination findings as documented by others; and I examined the patient myself.  I personally reviewed the relevant tests, images, and reports as documented above.  I formulated and edited as necessary the assessment and plan and discussed the findings and management plan with the patient and family. - Jonathan Segovia MD  Ophthalmology, PGY-5  Neuro-Ophthalmology Fellow

## 2019-08-14 ENCOUNTER — ALLIED HEALTH/NURSE VISIT (OUTPATIENT)
Dept: OPHTHALMOLOGY | Facility: CLINIC | Age: 59
End: 2019-08-14
Payer: MEDICARE

## 2019-08-14 DIAGNOSIS — G51.8 OTHER DISORDERS OF FACIAL NERVE: Primary | ICD-10-CM

## 2019-08-14 DIAGNOSIS — R25.8 SYNKINESIS: ICD-10-CM

## 2019-08-14 NOTE — PATIENT INSTRUCTIONS
Botulinum Toxin Home Care Instructions    Bleeding or bruising: Bruising is common. Putting ice on the area for 15 minutes  at a time several times today can help reduce bruising. The bruising typically goes away after 1-2 weeks.      Activity: You may resume normal activity.  Please avoid laying down flat for 4 hours after the injections.      Discomfort: You may take Tylenol (acetaminophen) for pain.  You really shouldn't have any pain after you leave the clinic.      Infection:  Infection is extremely rare.  Symptoms would include redness, swelling, or pain.  If you experience these symptoms, please call the office immediately.      Improvement:  Botulinum toxin takes between 3-7 days to begin to have an effect.  It reaches its maximal effect at about 1-3 weeks.      Side effects:  It is possible that patients could develop a droopy eyelid, double vision, or drooping of the side of the mouth.  If this occurs, it typically resolves after 2-3 weeks.  Make note of this, and we will try to change the location of the shots next visit.      Call your doctor if:  You have redness, swelling, or pain at the injection sites.    You develop drooping of your eyelid or double vision.    You have questions or concerns      The office number is [phone] or [phone]

## 2019-08-14 NOTE — PROGRESS NOTES
Assessment & Plan     Charles Santos is a 59 year old male with the following diagnoses:   1. Other disorders of facial nerve    2. Synkinesis         Botulinum toxin given without complication.  Patient to return in 3 months, sooner as needed.          Attending Physician Attestation:  I have seen and examined this patient.  I have confirmed and edited as necessary the chief complaint(s), history of present illness, review of systems, relevant history, and examination findings as documented by others.  I have personally reviewed the relevant tests, images, and reports as documented above.  I have confirmed and edited as necessary the assessment and plan and agree with this note.  - Jonathan Villeda MD 9:44 AM 8/14/2019

## 2019-08-20 ENCOUNTER — OFFICE VISIT (OUTPATIENT)
Dept: OPHTHALMOLOGY | Facility: CLINIC | Age: 59
End: 2019-08-20
Payer: MEDICARE

## 2019-08-20 DIAGNOSIS — R25.8 SYNKINESIS: Primary | ICD-10-CM

## 2019-08-20 DIAGNOSIS — G51.0 SEVENTH NERVE PALSY: ICD-10-CM

## 2019-08-20 DIAGNOSIS — H49.21 6TH NERVE PALSY, RIGHT: ICD-10-CM

## 2019-08-20 DIAGNOSIS — H02.539 EYELID RETRACTION OR LAG: ICD-10-CM

## 2019-08-20 ASSESSMENT — VISUAL ACUITY
OS_CC: 20/20
OD_CC: 20/25
METHOD: SNELLEN - LINEAR
CORRECTION_TYPE: GLASSES

## 2019-08-20 ASSESSMENT — TONOMETRY
IOP_METHOD: ICARE
OD_IOP_MMHG: 12
OS_IOP_MMHG: 13

## 2019-08-20 NOTE — NURSING NOTE
Chief Complaints and History of Present Illnesses   Patient presents with     Post Op (Ophthalmology) Right Eye     Chief Complaint(s) and History of Present Illness(es)     Post Op (Ophthalmology) Right Eye     Laterality: right eye    Frequency: constantly    Course: gradually improving    Associated symptoms: tearing    Treatments tried: no treatments    Pain scale: 0/10              Comments     S/p Right lower eyelid retraction repair with conjunctivoplasty, Alloderm (acellular human dermal matrix) graft, lateral canthopexy and temporary Paris suture tarsorrhaphy on 6/13/2019.    Pt states healing ok. Pt had botox injection with Dr. Villeda last Wednesday. Pt feels like there is a pulling sensation on the right side of face and eye. Has noticed more tearing again.     Alix Stephens COT 9:11 AM August 20, 2019

## 2019-08-20 NOTE — PROGRESS NOTES
Chief Complaint(s) and History of Present Illness(es)     Post Op (Ophthalmology) Right Eye     Laterality: right eye    Frequency: constantly    Course: gradually improving    Associated symptoms: tearing    Treatments tried: no treatments    Pain scale: 0/10              Comments     S/p Right lower eyelid retraction repair with conjunctivoplasty, Alloderm   (acellular human dermal matrix) graft, lateral canthopexy and temporary   Paris suture tarsorrhaphy on 6/13/2019.    Pt states healing ok. Pt had botox injection with Dr. Villeda last Wednesday.   Pt feels like there is a pulling sensation on the right side of face and   eye. Has noticed more tearing again.     Alix Stephens COT 9:11 AM August 20, 2019           Charles Santos is about 2 months status post right lower eyelid retraction repair.  He is doing well. Lower lid in excellent position. Bothered by dermatochalasis and brow ptosis much worse on right than left. Could consider bulb and right brow ptosis repair. At this point I recommended he wait as he has some dry eye, and also considering strab surgery. F/u in 9 months with me upper eyelid eval.      Complete documentation of historical and exam elements from today's encounter can be found in the full encounter summary report (not reduplicated in this progress note). I personally obtained the chief complaint(s) and history of present illness.  I confirmed and edited as necessary the review of systems, past medical/surgical history, family history, social history, and examination findings as documented by others; and I examined the patient myself. I personally reviewed the relevant tests, images, and reports as documented above. I formulated and edited as necessary the assessment and plan and discussed the findings and management plan with the patient and family. - Radha Guerra MD

## 2019-09-06 ENCOUNTER — OFFICE VISIT (OUTPATIENT)
Dept: OPHTHALMOLOGY | Facility: CLINIC | Age: 59
End: 2019-09-06
Attending: OPHTHALMOLOGY
Payer: MEDICARE

## 2019-09-06 DIAGNOSIS — H49.21 SIXTH NERVE PALSY OF RIGHT EYE: ICD-10-CM

## 2019-09-06 DIAGNOSIS — H53.10 SUBJECTIVE VISUAL DISTURBANCE: ICD-10-CM

## 2019-09-06 DIAGNOSIS — H53.2 DOUBLE VISION: Primary | ICD-10-CM

## 2019-09-06 PROCEDURE — 92060 SENSORIMOTOR EXAMINATION: CPT | Mod: ZF | Performed by: OPHTHALMOLOGY

## 2019-09-06 PROCEDURE — G0463 HOSPITAL OUTPT CLINIC VISIT: HCPCS | Mod: ZF | Performed by: TECHNICIAN/TECHNOLOGIST

## 2019-09-06 ASSESSMENT — REFRACTION_WEARINGRX
OS_HPRISM: 6 BO
OS_CYLINDER: +0.75
OD_SPHERE: +1.00
OD_HPRISM: 6 BO
OD_CYLINDER: +0.50
OS_SPHERE: +1.50
OD_ADD: +2.50
OD_AXIS: 015
SPECS_TYPE: BIFOCAL
OS_ADD: +2.50
OS_AXIS: 180

## 2019-09-06 ASSESSMENT — VISUAL ACUITY
OD_CC: 20/20
OS_CC: 20/20
METHOD: SNELLEN - LINEAR
CORRECTION_TYPE: GLASSES

## 2019-09-06 ASSESSMENT — CONF VISUAL FIELD
METHOD: COUNTING FINGERS
OD_NORMAL: 1
OS_NORMAL: 1

## 2019-09-06 ASSESSMENT — CUP TO DISC RATIO
OD_RATIO: 0.4
OS_RATIO: 0.4

## 2019-09-06 ASSESSMENT — TONOMETRY
IOP_METHOD: ICARE
OD_IOP_MMHG: 13
OS_IOP_MMHG: 14

## 2019-09-06 ASSESSMENT — EXTERNAL EXAM - LEFT EYE: OS_EXAM: NORMAL

## 2019-09-06 NOTE — PROGRESS NOTES
1. 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  Patient bothered by heavy prism glasses and highly interested in strabismus surgery. Discussed the risks, benefits, and alternatives of strabismus surgery. Patient wishes to proceed.    Patient was sent for consultation at the request of Dr. Villeda for strabismus.  Patient has a history of meningioma at the right pontomedullary junction with right 6th nerve palsy status-post resection on 4/12/18.  The resection was complicated by a right facial droop. He is s/p right lower eyelid retraction repair with conjunctivoplasty, Alloderm, lateral canthopexy and temporary tarsorrhaphy on 6/13/19 with Dr. Guerra. He is currently in prism glasses (12 prism diopters base out), which corrects the diplopia but he does not like having to wear thick glasses all the time. He denies any changes in vision.    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 Unchanged since at least 7/2016. No other abnormal  intracranial enhancement noted.     I reviewed these images today and agree with the interpretation.     On examination today, visual acuity is 20/20 in the right eye and 20/20 in the left eye. Pupillary exam is normal without evidence of afferent pupillary defect. Intraocular pressure is normal in both eyes. Confrontational visual fields are normal in both eyes. The patient is orthophoric in primary gaze with prism glasses (wearing 12 base out total correction). 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 He has a -2 abduction deficit in the right eye. Anterior segment exam is remarkable for right CN7 palsy, right lower lid laxity, few inferior PEE right eye  and mild nuclear sclerotic cataracts. Optic nerves appear healthy. Dilated fundoscopic exam is unremarkable.     It is my impression that the patient has a right sixth nerve palsy secondary to a pontomedullary junction meningioma. 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 This has been grossly stable since December 2018 (10 PD esotropia at that time). He is intolerant of prism glasses and highly interested in strabismus surgery.    We discussed in detail the risks, benefits, and alternatives of eye muscle correction surgery including the very rare risk of death or serious morbidity from a general anesthesia complication and the rare risk of severe vision loss in the operative eye(s) secondary to retinal detachment or endophthalmitis.  We discussed more likely sub-optimal outcomes including the unanticipated need for additional strabismus surgery.  Finally the patient was aware that prisms glasses may be required to optimize single vision following surgery.    After a thorough discussion of these risks, the patient decided to proceed with strabismus surgery.  The surgical plan is as follows:     1. Left medial rectus recession on adjustable suture    Follow-up 1 week after strabismus surgery.    Plan to [facility] at: [facility]  Prism free glasses for adjustment: gave patient prescription today which he will fill         Complete documentation of historical and exam elements from today's encounter can be found in the full encounter summary report (not reduplicated in this progress note).  I personally obtained the chief complaint(s) and history of present illness.  I confirmed and edited as necessary the review of systems, past medical/surgical history, family history, social history, and examination findings as documented by others; and I examined the patient myself.  I personally reviewed the relevant tests, images, and reports as documented above.  I formulated and  edited as necessary the assessment and plan and discussed the findings and management plan with the patient and family.  I personally reviewed the ophthalmic test(s) associated with this encounter, agree with the interpretation(s) as documented by the resident/fellow, and have edited the corresponding report(s) as necessary.     MD Purnima Pham MD  Ophthalmology Resident, PGY-3

## 2019-09-06 NOTE — LETTER
2019    RE: Charles Santos  : 1960  MRN: [id number]    Dear Dr. Villeda,    Thank you for referring your patient, Charles Santos, to my neuro-ophthalmology clinic recently.  After a thorough neuro-ophthalmic history and examination, I came to the following conclusions:      1. 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  Patient bothered by heavy prism glasses and highly interested in strabismus surgery. Discussed the risks, benefits, and alternatives of strabismus surgery. Patient wishes to proceed.    Patient was sent for consultation at the request of Dr. Villeda for strabismus.  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  The resection was complicated by a right facial droop. He is s/p right lower eyelid retraction repair with conjunctivoplasty, Alloderm, lateral canthopexy and temporary tarsorrhaphy on 19 with Dr. Guerra. He is currently in prism glasses (12 prism diopters base out), which corrects the diplopia but he does not like having to wear thick glasses all the time. He denies any changes in vision.    POHx: right lower eyelid retraction repair with conjunctivoplasty, Alloderm, lateral canthopexy and temporary tarsorrhaphy, right CN6 palsy  PMHx: HIV T4 856 (2019), HTN, HLD,  Borderline T2DM (7.2 19)    MRI Brain 4/10/19  Radiology read:   Stable right cerebellopontine angle enhancing mass, presumed  meningioma. Unchanged since at least 2016. No other abnormal  intracranial enhancement noted.     I reviewed these images today and agree with the interpretation.     On examination today, visual acuity is 20/20 in the right eye and 20/20 in the left eye. Pupillary exam is normal without evidence of afferent pupillary defect. Intraocular pressure is normal in both eyes. Confrontational visual fields are normal in both eyes. The patient is orthophoric in  primary gaze with prism glasses (wearing 12 base out total correction). 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 He has a -2 abduction deficit in the right eye. Anterior segment exam is remarkable for right CN7 palsy, right lower lid laxity, few inferior PEE right eye and mild nuclear sclerotic cataracts. Optic nerves appear healthy. Dilated fundoscopic exam is unremarkable.     It is my impression that the patient has a right sixth nerve palsy secondary to a pontomedullary junction meningioma. 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 This has been grossly stable since December 2018 (10 PD esotropia at that time). He is intolerant of prism glasses and highly interested in strabismus surgery.    We discussed in detail the risks, benefits, and alternatives of eye muscle correction surgery including the very rare risk of death or serious morbidity from a general anesthesia complication and the rare risk of severe vision loss in the operative eye(s) secondary to retinal detachment or endophthalmitis.  We discussed more likely sub-optimal outcomes including the unanticipated need for additional strabismus surgery.  Finally the patient was aware that prisms glasses may be required to optimize single vision following surgery.    After a thorough discussion of these risks, the patient decided to proceed with strabismus surgery.  The surgical plan is as follows:     1. Left medial rectus recession on adjustable suture    Follow-up 1 week after strabismus surgery.    Plan to operate at: [facility]  Prism free glasses for adjustment: gave patient prescription today which he will fill    Again, thank you for trusting me with the care of your patient.  For further exam details, please feel free to contact our office for additional records.  If you wish to contact me regarding this patient please email me at [email or web address] or give my clinic a call to arrange a  phone conversation.    Sincerely,    Thomas aSlazar MD  , Neuro-Ophthalmology and Adult Strabismus Surgery  The Allan PEREZ and Chiquita Ortiz Chair in Neuro-Ophthalmology  Department of Ophthalmology and Visual Neurosciences  [facility]    DX: cranial nerve 6 palsy, meningioma, strabismus surgery

## 2019-09-09 ENCOUNTER — TELEPHONE (OUTPATIENT)
Dept: OPHTHALMOLOGY | Facility: CLINIC | Age: 59
End: 2019-09-09

## 2019-11-01 ENCOUNTER — ANESTHESIA EVENT (OUTPATIENT)
Dept: SURGERY | Facility: AMBULATORY SURGERY CENTER | Age: 59
End: 2019-11-01

## 2019-11-04 ENCOUNTER — ANESTHESIA (OUTPATIENT)
Dept: SURGERY | Facility: AMBULATORY SURGERY CENTER | Age: 59
End: 2019-11-04

## 2019-11-04 ENCOUNTER — HOSPITAL ENCOUNTER (OUTPATIENT)
Facility: AMBULATORY SURGERY CENTER | Age: 59
End: 2019-11-04
Attending: OPHTHALMOLOGY
Payer: MEDICARE

## 2019-11-04 VITALS
DIASTOLIC BLOOD PRESSURE: 62 MMHG | RESPIRATION RATE: 16 BRPM | SYSTOLIC BLOOD PRESSURE: 108 MMHG | TEMPERATURE: 97.7 F | HEART RATE: 55 BPM | OXYGEN SATURATION: 93 %

## 2019-11-04 DIAGNOSIS — Z98.890 POSTOPERATIVE EYE STATE: Primary | ICD-10-CM

## 2019-11-04 LAB — GLUCOSE BLDC GLUCOMTR-MCNC: 164 MG/DL (ref 70–99)

## 2019-11-04 RX ORDER — FENTANYL CITRATE 50 UG/ML
25-50 INJECTION, SOLUTION INTRAMUSCULAR; INTRAVENOUS
Status: DISCONTINUED | OUTPATIENT
Start: 2019-11-04 | End: 2019-11-04 | Stop reason: HOSPADM

## 2019-11-04 RX ORDER — TETRACAINE HYDROCHLORIDE 5 MG/ML
1 SOLUTION OPHTHALMIC ONCE
Status: DISCONTINUED | OUTPATIENT
Start: 2019-11-04 | End: 2019-11-04 | Stop reason: HOSPADM

## 2019-11-04 RX ORDER — NALOXONE HYDROCHLORIDE 0.4 MG/ML
.1-.4 INJECTION, SOLUTION INTRAMUSCULAR; INTRAVENOUS; SUBCUTANEOUS
Status: DISCONTINUED | OUTPATIENT
Start: 2019-11-04 | End: 2019-11-05 | Stop reason: HOSPADM

## 2019-11-04 RX ORDER — OXYCODONE HYDROCHLORIDE 5 MG/1
5 TABLET ORAL EVERY 4 HOURS PRN
Status: DISCONTINUED | OUTPATIENT
Start: 2019-11-04 | End: 2019-11-05 | Stop reason: HOSPADM

## 2019-11-04 RX ORDER — DEXAMETHASONE SODIUM PHOSPHATE 4 MG/ML
INJECTION, SOLUTION INTRA-ARTICULAR; INTRALESIONAL; INTRAMUSCULAR; INTRAVENOUS; SOFT TISSUE PRN
Status: DISCONTINUED | OUTPATIENT
Start: 2019-11-04 | End: 2019-11-04

## 2019-11-04 RX ORDER — SODIUM CHLORIDE, SODIUM LACTATE, POTASSIUM CHLORIDE, CALCIUM CHLORIDE 600; 310; 30; 20 MG/100ML; MG/100ML; MG/100ML; MG/100ML
INJECTION, SOLUTION INTRAVENOUS CONTINUOUS PRN
Status: DISCONTINUED | OUTPATIENT
Start: 2019-11-04 | End: 2019-11-04

## 2019-11-04 RX ORDER — PREDNISOLONE ACETATE 10 MG/ML
1-2 SUSPENSION/ DROPS OPHTHALMIC 4 TIMES DAILY
Qty: 1 BOTTLE | Refills: 0 | Status: SHIPPED | OUTPATIENT
Start: 2019-11-04 | End: 2019-11-13

## 2019-11-04 RX ORDER — LIDOCAINE 40 MG/G
CREAM TOPICAL
Status: DISCONTINUED | OUTPATIENT
Start: 2019-11-04 | End: 2019-11-04 | Stop reason: HOSPADM

## 2019-11-04 RX ORDER — LIDOCAINE HYDROCHLORIDE 20 MG/ML
INJECTION, SOLUTION INFILTRATION; PERINEURAL PRN
Status: DISCONTINUED | OUTPATIENT
Start: 2019-11-04 | End: 2019-11-04

## 2019-11-04 RX ORDER — ACETAMINOPHEN 325 MG/1
975 TABLET ORAL ONCE
Status: COMPLETED | OUTPATIENT
Start: 2019-11-04 | End: 2019-11-04

## 2019-11-04 RX ORDER — PROPOFOL 10 MG/ML
INJECTION, EMULSION INTRAVENOUS PRN
Status: DISCONTINUED | OUTPATIENT
Start: 2019-11-04 | End: 2019-11-04

## 2019-11-04 RX ORDER — ONDANSETRON 2 MG/ML
4 INJECTION INTRAMUSCULAR; INTRAVENOUS EVERY 30 MIN PRN
Status: DISCONTINUED | OUTPATIENT
Start: 2019-11-04 | End: 2019-11-05 | Stop reason: HOSPADM

## 2019-11-04 RX ORDER — MULTIVITAMIN WITH IRON
1 TABLET ORAL DAILY
COMMUNITY
End: 2020-11-05

## 2019-11-04 RX ORDER — OXYMETAZOLINE HYDROCHLORIDE 0.05 G/100ML
SPRAY NASAL PRN
Status: DISCONTINUED | OUTPATIENT
Start: 2019-11-04 | End: 2019-11-04 | Stop reason: HOSPADM

## 2019-11-04 RX ORDER — BALANCED SALT SOLUTION 6.4; .75; .48; .3; 3.9; 1.7 MG/ML; MG/ML; MG/ML; MG/ML; MG/ML; MG/ML
SOLUTION OPHTHALMIC PRN
Status: DISCONTINUED | OUTPATIENT
Start: 2019-11-04 | End: 2019-11-04 | Stop reason: HOSPADM

## 2019-11-04 RX ORDER — SODIUM CHLORIDE, SODIUM LACTATE, POTASSIUM CHLORIDE, CALCIUM CHLORIDE 600; 310; 30; 20 MG/100ML; MG/100ML; MG/100ML; MG/100ML
INJECTION, SOLUTION INTRAVENOUS CONTINUOUS
Status: DISCONTINUED | OUTPATIENT
Start: 2019-11-04 | End: 2019-11-05 | Stop reason: HOSPADM

## 2019-11-04 RX ORDER — GABAPENTIN 300 MG/1
300 CAPSULE ORAL ONCE
Status: COMPLETED | OUTPATIENT
Start: 2019-11-04 | End: 2019-11-04

## 2019-11-04 RX ORDER — TETRACAINE HYDROCHLORIDE 5 MG/ML
1 SOLUTION OPHTHALMIC ONCE
Status: COMPLETED | OUTPATIENT
Start: 2019-11-04 | End: 2019-11-04

## 2019-11-04 RX ORDER — GLYCOPYRROLATE 0.2 MG/ML
INJECTION, SOLUTION INTRAMUSCULAR; INTRAVENOUS PRN
Status: DISCONTINUED | OUTPATIENT
Start: 2019-11-04 | End: 2019-11-04

## 2019-11-04 RX ORDER — SODIUM CHLORIDE, SODIUM LACTATE, POTASSIUM CHLORIDE, CALCIUM CHLORIDE 600; 310; 30; 20 MG/100ML; MG/100ML; MG/100ML; MG/100ML
INJECTION, SOLUTION INTRAVENOUS CONTINUOUS
Status: DISCONTINUED | OUTPATIENT
Start: 2019-11-04 | End: 2019-11-04 | Stop reason: HOSPADM

## 2019-11-04 RX ORDER — EPHEDRINE SULFATE 50 MG/ML
INJECTION, SOLUTION INTRAMUSCULAR; INTRAVENOUS; SUBCUTANEOUS PRN
Status: DISCONTINUED | OUTPATIENT
Start: 2019-11-04 | End: 2019-11-04

## 2019-11-04 RX ORDER — ONDANSETRON 4 MG/1
4 TABLET, ORALLY DISINTEGRATING ORAL EVERY 30 MIN PRN
Status: DISCONTINUED | OUTPATIENT
Start: 2019-11-04 | End: 2019-11-05 | Stop reason: HOSPADM

## 2019-11-04 RX ORDER — MEPERIDINE HYDROCHLORIDE 25 MG/ML
12.5 INJECTION INTRAMUSCULAR; INTRAVENOUS; SUBCUTANEOUS
Status: DISCONTINUED | OUTPATIENT
Start: 2019-11-04 | End: 2019-11-05 | Stop reason: HOSPADM

## 2019-11-04 RX ORDER — ONDANSETRON 2 MG/ML
INJECTION INTRAMUSCULAR; INTRAVENOUS PRN
Status: DISCONTINUED | OUTPATIENT
Start: 2019-11-04 | End: 2019-11-04

## 2019-11-04 RX ORDER — FENTANYL CITRATE 50 UG/ML
INJECTION, SOLUTION INTRAMUSCULAR; INTRAVENOUS PRN
Status: DISCONTINUED | OUTPATIENT
Start: 2019-11-04 | End: 2019-11-04

## 2019-11-04 RX ORDER — PROPOFOL 10 MG/ML
INJECTION, EMULSION INTRAVENOUS CONTINUOUS PRN
Status: DISCONTINUED | OUTPATIENT
Start: 2019-11-04 | End: 2019-11-04

## 2019-11-04 RX ADMIN — SODIUM CHLORIDE, SODIUM LACTATE, POTASSIUM CHLORIDE, CALCIUM CHLORIDE: 600; 310; 30; 20 INJECTION, SOLUTION INTRAVENOUS at 10:08

## 2019-11-04 RX ADMIN — LIDOCAINE HYDROCHLORIDE 100 MG: 20 INJECTION, SOLUTION INFILTRATION; PERINEURAL at 11:06

## 2019-11-04 RX ADMIN — TETRACAINE HYDROCHLORIDE 1 DROP: 5 SOLUTION OPHTHALMIC at 12:08

## 2019-11-04 RX ADMIN — FENTANYL CITRATE 25 MCG: 50 INJECTION, SOLUTION INTRAMUSCULAR; INTRAVENOUS at 11:14

## 2019-11-04 RX ADMIN — ACETAMINOPHEN 975 MG: 325 TABLET ORAL at 09:48

## 2019-11-04 RX ADMIN — EPHEDRINE SULFATE 7.5 MG: 50 INJECTION, SOLUTION INTRAMUSCULAR; INTRAVENOUS; SUBCUTANEOUS at 11:32

## 2019-11-04 RX ADMIN — PROPOFOL 80 MG: 10 INJECTION, EMULSION INTRAVENOUS at 11:16

## 2019-11-04 RX ADMIN — ONDANSETRON 4 MG: 2 INJECTION INTRAMUSCULAR; INTRAVENOUS at 11:12

## 2019-11-04 RX ADMIN — SODIUM CHLORIDE, SODIUM LACTATE, POTASSIUM CHLORIDE, CALCIUM CHLORIDE: 600; 310; 30; 20 INJECTION, SOLUTION INTRAVENOUS at 11:42

## 2019-11-04 RX ADMIN — EPHEDRINE SULFATE 7.5 MG: 50 INJECTION, SOLUTION INTRAMUSCULAR; INTRAVENOUS; SUBCUTANEOUS at 11:30

## 2019-11-04 RX ADMIN — PROPOFOL 50 MG: 10 INJECTION, EMULSION INTRAVENOUS at 11:24

## 2019-11-04 RX ADMIN — GABAPENTIN 300 MG: 300 CAPSULE ORAL at 09:48

## 2019-11-04 RX ADMIN — DEXAMETHASONE SODIUM PHOSPHATE 4 MG: 4 INJECTION, SOLUTION INTRA-ARTICULAR; INTRALESIONAL; INTRAMUSCULAR; INTRAVENOUS; SOFT TISSUE at 11:12

## 2019-11-04 RX ADMIN — PROPOFOL 200 MG: 10 INJECTION, EMULSION INTRAVENOUS at 11:06

## 2019-11-04 RX ADMIN — PROPOFOL 200 MCG/KG/MIN: 10 INJECTION, EMULSION INTRAVENOUS at 11:06

## 2019-11-04 RX ADMIN — GLYCOPYRROLATE 0.2 MG: 0.2 INJECTION, SOLUTION INTRAMUSCULAR; INTRAVENOUS at 11:06

## 2019-11-04 ASSESSMENT — COPD QUESTIONNAIRES: COPD: 1

## 2019-11-04 NOTE — ANESTHESIA PREPROCEDURE EVALUATION
Anesthesia Pre-Procedure Evaluation    Patient: Charles Santos   MRN:     [id number] Gender:   male   Age:    59 year old :      1960        Preoperative Diagnosis: Strabismus   Procedure(s):  Left Strabismus Repair, Possible Use of Adjustable Suture     Past Medical History:   Diagnosis Date     Anxiety      Borderline diabetes      GERD (gastroesophageal reflux disease)      HIV (human immunodeficiency virus infection) (H)      HTN (hypertension)      Meningioma (H)      Personal history of PE (pulmonary embolism)     , currently on xarelto      Past Surgical History:   Procedure Laterality Date     CRANIOTOMY, SUBOCCIPITAL N/A 2018    Procedure: CRANIOTOMY, SUBOCCIPITAL;  Suboccipital crainectomy for biopsy of Right Cerebellopontine Angle Meningioma with fat graft;  Surgeon: Azeem Galvin MD;  Location: [facility] OR     DENTAL SURGERY       REPAIR RETRACTION LID Right 2019    Procedure: Right Lower Eyelid Retraction Repair with Thin Alloderm, Temporary Tarsorrhaphy;  Surgeon: Radha Guerra MD;  Location: [facility] OR     TARSORRHAPHY Right 2019    Procedure: Temporary Tarsorrhaphy;  Surgeon: Radha Guerra MD;  Location: [facility] OR          Anesthesia Evaluation     . Pt has had prior anesthetic. Type: General    History of anesthetic complications          ROS/MED HX    ENT/Pulmonary:     (+)COPD, , . .    Neurologic:       Cardiovascular:     (+) hypertension----. : . . . :. .       METS/Exercise Tolerance:     Hematologic:         Musculoskeletal:         GI/Hepatic:     (+) GERD       Renal/Genitourinary:         Endo:     (+) type II DM .      Psychiatric:         Infectious Disease:         Malignancy:         Other:                         PHYSICAL EXAM:   Mental Status/Neuro: A/A/O   Airway: Facies: Feasible  Mallampati: I  Mouth/Opening: Full  TM distance: > 6 cm  Neck ROM: Full   Respiratory: Auscultation: CTAB     Resp. Rate: Normal     Resp. Effort: Normal      CV:  "Rhythm: Regular  Rate: Age appropriate  Heart: Normal Sounds  Edema: None   Comments:      Dental: Normal Dentition                LABS:  CBC:   Lab Results   Component Value Date    WBC 7.9 04/17/2018    WBC 7.8 04/16/2018    HGB 13.3 04/17/2018    HGB 13.6 04/16/2018    HCT 40.2 04/17/2018    HCT 42.1 04/16/2018     04/17/2018     04/16/2018     BMP:   Lab Results   Component Value Date     04/17/2018     04/16/2018    POTASSIUM 3.8 04/17/2018    POTASSIUM 4.0 04/16/2018    CHLORIDE 105 04/17/2018    CHLORIDE 105 04/16/2018    CO2 25 04/17/2018    CO2 25 04/16/2018    BUN 29 04/17/2018    BUN 28 04/16/2018    CR 0.76 04/17/2018    CR 0.66 04/16/2018     (H) 04/17/2018     (H) 04/16/2018     COAGS:   Lab Results   Component Value Date    PTT 29 04/02/2018    INR 0.96 04/02/2018     POC:   Lab Results   Component Value Date     (H) 11/04/2019     OTHER:   Lab Results   Component Value Date    PH 7.31 (L) 04/12/2018    A1C 5.8 04/13/2018    BILL 9.2 04/17/2018    PHOS 2.6 04/15/2018    MAG 2.3 04/15/2018    ALBUMIN 3.6 03/24/2018    PROTTOTAL 7.2 03/24/2018    ALT 22 03/24/2018    AST 6 03/24/2018    ALKPHOS 78 03/24/2018    BILITOTAL 0.3 03/24/2018        Preop Vitals    BP Readings from Last 3 Encounters:   11/04/19 124/68   06/13/19 109/62   04/10/19 112/70    Pulse Readings from Last 3 Encounters:   06/13/19 51   04/10/19 (!) 47   03/01/19 56      Resp Readings from Last 3 Encounters:   11/04/19 16   06/13/19 16   04/17/18 16    SpO2 Readings from Last 3 Encounters:   11/04/19 95%   06/13/19 94%   04/10/19 97%      Temp Readings from Last 1 Encounters:   11/04/19 36.6  C (97.9  F) (Oral)    Ht Readings from Last 1 Encounters:   06/13/19 1.765 m (5' 9.5\")      Wt Readings from Last 1 Encounters:   06/13/19 71.7 kg (158 lb)    Estimated body mass index is 23 kg/m  as calculated from the following:    Height as of 6/13/19: 1.765 m (5' 9.5\").    Weight as of 6/13/19: 71.7 " kg (158 lb).     LDA:  Peripheral IV 04/12/18 Left Hand (Active)   Number of days: 571       Peripheral IV 11/04/19 Left Wrist (Active)   Site Assessment WDL 11/4/2019 10:01 AM   Line Status Infusing 11/4/2019 10:01 AM   Phlebitis Scale 0-->no symptoms 11/4/2019 10:01 AM   Infiltration Scale 0 11/4/2019 10:01 AM   Number of days: 0       NG/OG Tube 10 fr Right nostril (Active)   Number of days: 570        Assessment:   ASA SCORE: 2    H&P: History and physical reviewed and following examination; no interval change.   Smoking Status:  Non-Smoker/Unknown   NPO Status: NPO Appropriate     Plan:   Anes. Type:  General   Pre-Medication: None   Induction:  IV (Standard)   Airway: LMA   Access/Monitoring: PIV   Maintenance: TIVA     Postop Plan:   Postop Pain: Opioids  Postop Sedation/Airway: Not planned  Disposition: Outpatient     PONV Management:   Adult Risk Factors:, Non-Smoker, Postop Opioids   Prevention: Ondansetron, Dexamethasone, No Volatiles     CONSENT: Direct conversation   Plan and risks discussed with: Patient                      Giovanni Bush MD, MD

## 2019-11-04 NOTE — BRIEF OP NOTE
[facility] Brief Operative Note    Pre-operative diagnosis: Strabismus   Post-operative diagnosis same   Procedure: Procedure(s):  Left Strabismus Repair, with Adjustable Suture   Surgeon(s): Surgeon(s) and Role:     * Thomas Salazar MD - Primary     * Elkin Yuen MD - Resident - Assisting   Estimated blood loss: < 1 cc   Specimens: none   Findings: See full operative report

## 2019-11-04 NOTE — DISCHARGE INSTRUCTIONS
[facility]  Home Care Following Anesthesia  For 24 hours after surgery:  1. Get plenty of rest.  A responsible adult must stay with you for at least 24 hours after you leave the surgery center.  2. Do not drive or use heavy equipment.  If you have weakness or tingling, don't drive or use heavy equipment until this feeling goes away.   3. Do not drink alcohol.   4. Avoid strenuous or risky activities.  Ask for help when climbing stairs.  5. You may feel lightheaded.  IF so, sit for a few minutes before standing.  Have someone help you get up.   6. If you have nausea (feel sick to your stomach): Drink only clear liquids such as apple juice, ginger ale, broth or 7-Up.  Rest may also help.  Be sure to drink enough fluids.  Move to a regular diet as you feel able.   7. You may have a slight fever.  Call the doctor if your fever is over 100 F (37.7 C) (taken under the tongue) or lasts longer than 24 hours.  8. You may have a dry mouth, a sore throat, muscle aches or trouble sleeping. These should go away after 24 hours.  9. Do not make important or legal decisions.               Tips for taking pain medications  To get the best pain relief possible, remember these points:    Take pain medications as directed, before pain becomes severe.    Pain medication can upset your stomach: taking it with food may help.    Constipation is a common side effect of pain medication. Drink plenty of  fluids.    Eat foods high in fiber. Take a stool softener if recommended by your doctor or pharmacist.    Do not drink alcohol, drive or operate machinery while taking pain medications.    Ask about other ways to control pain, such as with heat, ice or relaxation.    Tylenol/Acetaminophen Consumption  To help encourage the safe use of acetaminophen, the makers of TYLENOL  have lowered the maximum daily dose for single-ingredient Extra Strength TYLENOL  (acetaminophen) products sold in the [place] from 8  pills per day (4,000 mg) to 6 pills per day (3,000 mg). The dosing interval has also changed from 2 pills every 4-6 hours to 2 pills every 6 hours.    If you feel your pain relief is insufficient, you may take Tylenol/Acetaminophen in addition to your narcotic pain medication.     Be careful not to exceed 3,000 mg of Tylenol/Acetaminophen in a 24 hour period from all sources.    If you are taking extra strength Tylenol/acetaminophen (500 mg), the maximum dose is 6 tablets in 24 hours.    If you are taking regular strength acetaminophen (325 mg), the maximum dose is 9 tablets in 24 hours.    Call a doctor for any of the followin. Signs of infection (fever, growing tenderness at the surgery site, a large amount of drainage or bleeding, severe pain, foul-smelling drainage, redness, swelling).  2. It has been over 8 to 10 hours since surgery and you are still not able to urinate (pass water).  3. Headache for over 24 hours.  Your doctor is:       Dr. Thomas Salazar, Ophthalmology: [phone]               Or dial [phone] and ask for the resident on call for:  Ophthalmology  For emergency care, call the:  [facility]:  [phone] (TTY for hearing impaired: [phone])

## 2019-11-04 NOTE — ANESTHESIA CARE TRANSFER NOTE
Patient: Charles Santos    Procedure(s):  Left Strabismus Repair, with Adjustable Suture    Diagnosis: Strabismus  Diagnosis Additional Information: No value filed.    Anesthesia Type:   General     Note:    Patient transferred to:PACU  Handoff Report: Identifed the Patient, Identified the Reponsible Provider, Reviewed the pertinent medical history, Discussed the surgical course, Reviewed Intra-OP anesthesia mangement and issues during anesthesia, Set expectations for post-procedure period and Allowed opportunity for questions and acknowledgement of understanding      Vitals: (Last set prior to Anesthesia Care Transfer)    CRNA VITALS  11/4/2019 1119 - 11/4/2019 1157      11/4/2019             Pulse:  67    Ht Rate:  67    SpO2:  98 %    Resp Rate (observed):  (!) 1    Resp Rate (set):  10                Electronically Signed By: TERESA WESTON CRNA  November 4, 2019  11:57 AM

## 2019-11-04 NOTE — ANESTHESIA POSTPROCEDURE EVALUATION
Anesthesia POST Procedure Evaluation    Patient: Charles Santos   MRN:     [id number] Gender:   male   Age:    59 year old :      1960        Preoperative Diagnosis: Strabismus   Procedure(s):  Left Strabismus Repair, with Adjustable Suture   Postop Comments: No value filed.       Anesthesia Type:  Not documented  General    Reportable Event: NO     PAIN: Uncomplicated   Sign Out status: Comfortable, Well controlled pain     PONV: No PONV   Sign Out status:  No Nausea or Vomiting     Neuro/Psych: Uneventful perioperative course   Sign Out Status: Preoperative baseline; Age appropriate mentation     Airway/Resp.: Uneventful perioperative course   Sign Out Status: Non labored breathing, age appropriate RR; Resp. Status within EXPECTED Parameters     CV: Uneventful perioperative course   Sign Out status: Appropriate BP and perfusion indices; Appropriate HR/Rhythm     Disposition:   Sign Out in:  PACU  Disposition:  Phase II; Home  Recovery Course: Uneventful  Follow-Up: Not required           Last Anesthesia Record Vitals:  CRNA VITALS  2019 1119 - 2019 1219      2019             Pulse:  67    Ht Rate:  67    SpO2:  98 %    Resp Rate (observed):  (!) 1    Resp Rate (set):  10          Last PACU Vitals:  Vitals Value Taken Time   /65 2019 12:15 PM   Temp 36.8  C (98.3  F) 2019 12:15 PM   Pulse 59 2019 12:15 PM   Resp 17 2019 12:15 PM   SpO2 93 % 2019 12:15 PM   Temp src     NIBP     Pulse     SpO2     Resp     Temp     Ht Rate     Temp 2           Electronically Signed By: Giovanni Bush MD, MD, 2019, 12:21 PM

## 2019-11-05 NOTE — OP NOTE
"ATTENDING SURGEON:  Thomas Salazar MD    DATE OF PROCEDURE:  November 4, 2019    FIRST SURGICAL ASSISTANT:  LUZ MARINA LUCAS MD     ANESTHESIA:  General anesthesia    PREOPERATIVE DIAGNOSIS (ES):  1. Esotropia secondary to right cranial nerve 6 palsy     POSTOPERATIVE DIAGNOSIS (ES):  Same    NAME OF OPERATION:  1. Left medial rectus recession 4.0 mm on adjustable suture- recessed an additional 2 mm during postoperative adjustment    INDICATIONS FOR PROCEDURE:    The patient is a pleasant 59 year old male with a history of right cranial nerve 6 palsy in association with resection of a right sided pontomedullary meningioma.  His strabismus failed to self resolve and he was intolerant of his prism glasses and wished to proceed to surgery to reduce his dependency on prism glasses.    I warned the patient about the risks, benefits, and alternatives of eye muscle surgery including a relatively small risk for severe infection, retinal detachment, and permanent vision loss of the eye.  I also discussed the possibility of postoperative double vision.  I discussed the possibility that due to postoperative double vision or a postoperative recurrent strabismus, the patient may require additional strabismus procedures in the future.  Understanding these risks, the patient decided to proceed with strabismus surgery.      DESCRIPTION OF PROCEDURE:    After the risks, benefits, and alternatives of eye muscle surgery were discussed with the patient and the patient's questions were answered both in clinic and on the day of surgery, written informed consent was obtained and witnessed in the preoperative holding area on the day of surgery.  The word \"yes\" was written over the left eye indicating that the patient consented to eye muscle correction in the left eye.  An intravenous line was placed and light intravenous sedation was given.  The patient was transported to the operating room where after appropriate monitors were placed, " the patient underwent induction of general anesthesia without complication.      Both eyes were prepped and draped in the usual sterile fashion for ophthalmic surgery and a lid speculum was placed in the left eye.  Forced duction testing in this eye revealed no restriction.  A trapezoidal nasal conjunctival flap was created using conjunctival forceps and Jeimy scissors.  This was reflected backwards to expose the left medial rectus muscle which was isolated using a Drifton muscle hook.  The muscle was freed from Tenon's capsule with blunt dissection using a Jeimy scissors and cotton swab.  A double armed 6-0 Vicryl suture was then woven across the width of the muscle near it's insertion of the globe and tied to the edges.  The muscle was disinserted from the globe using Jeimy scissors.  Both arms of the 6-0 Vicryl suture were then passed partial thickness through the sclera at the physiologic muscle insertion in a  crossing swords technique.   At this point, a 6-0 undyed Vicryl suture with needle removed was used to create a sliding  noose  knot allowing for post-operative adjustment.  A 6-0 Vicryl suture on a spatulated needle was then used to make a partial-thickness scleral bite adjacent to the operative muscle physiologic insertion to serve as a traction suture facilitating the adjustment process.  The needles were then cut off of the double armed 6-0 suture on which the extraocular muscle was recessed.  The operative muscle was then recessed 4.0 mm from its original insertion as measured with a caliper.  Viscoelastic agent was then used to provide lubrication to the noose knot so that it would slide freely.  The trapezoidal conjunctival flap was reopposed in the surgical bed and held in place with 6-0 plain gut suture using one permanent suture and one temporary suture (which would be made permanent following adjustment post-operartively).The lid speculum was removed from the operative eye.      Maxitrol ointment was placed in the left eye.  The patient was awakened from general anesthesia without complication and discharged to the recovery room in stable condition.       SPECIMENS REMOVED:  None.      ESTIMATED BLOOD LOSS:  1 mL or less.    INTRAOPERATIVE FLUIDS:  As per anesthesia records.      SPONGE/INSTRUMENT/NEEDLE COUNTS:  All sponge, instrument, and needle counts were correct times two.    CONDITION ON DISCHARGE FROM OPERATING ROOM:  Stable.      COMPLICATIONS:  None.     I was present for the entire procedure    POST-OPERATIVE ADJUSTMENT    After awakening from anesthesia sufficiently to communicate clearly and fixate on a target consistent, the adjustment process began.  Tetracaine topical anesthetic was placed in both eyes and steri-strips holding down the temporary sutures were removed from the face.      Alignment measurements were obtained with the patient fixating a distance target and wearing prism free glasses and was found to have a residual 6 prism diopter esotropia.  The left medial rectus was then recessed an additional approximate 2 mm at which time the patient had a 1 prism diopter exophoria in primary gaze without diplopia and a large window of single binocular vision around primary gaze.    All sutures were then tied off and ends were cut short.  The adjustment process took an estimated 20 minutes to complete.

## 2019-11-08 ENCOUNTER — TELEPHONE (OUTPATIENT)
Dept: OPHTHALMOLOGY | Facility: CLINIC | Age: 59
End: 2019-11-08

## 2019-11-08 NOTE — TELEPHONE ENCOUNTER
Pain: yes; feels like something in the eye or the stiches rubbing. Advised patient this was normal and would improve.   Using OTC medication: tylenol on the first two days, hasn't needed anything yesterday or today.     Abnormal swelling or purulent discharge: none, doing really well.     Patient using ointment as directed (TID): yes    Vision: no double noticed; Patient very pleased with vision and per patient, somehow it seems like his right eye has improved too; so far so good     Reminded patient of post op date (11/13/19 at 10:00am) and provided instructions for eye drop (QID) should patient run out of ointment before post op date.   Provided patient with direct line in case of questions or concerns    Natalie Garay  Neuro-Ophthalmology Clinical Facilitator  [phone]  11:28 AM November 8, 2019

## 2019-11-13 ENCOUNTER — OFFICE VISIT (OUTPATIENT)
Dept: OPHTHALMOLOGY | Facility: CLINIC | Age: 59
End: 2019-11-13
Attending: OPHTHALMOLOGY
Payer: MEDICARE

## 2019-11-13 DIAGNOSIS — Z98.890 POSTOPERATIVE EYE STATE: ICD-10-CM

## 2019-11-13 DIAGNOSIS — H49.21 6TH NERVE PALSY, RIGHT: Primary | ICD-10-CM

## 2019-11-13 PROCEDURE — G0463 HOSPITAL OUTPT CLINIC VISIT: HCPCS | Mod: ZF | Performed by: TECHNICIAN/TECHNOLOGIST

## 2019-11-13 ASSESSMENT — TONOMETRY
OD_IOP_MMHG: 11
OS_IOP_MMHG: 12
IOP_METHOD: ICARE

## 2019-11-13 ASSESSMENT — VISUAL ACUITY
CORRECTION_TYPE: GLASSES
METHOD: SNELLEN - LINEAR
OD_CC: 20/20
OS_CC: 20/30

## 2019-11-13 NOTE — NURSING NOTE
Chief Complaint(s) and History of Present Illness(es)     Follow Up     In left eye (1 week follow up status post strabismus surgery).              Comments     -Surgery 11/04/2019  1. Left medial rectus recession 4.0 mm on adjustable suture- recessed an additional 2 mm during postoperative adjustment    Patient states he is very pleased with the outcome. No double vision-->can watch television without his glasses.   Doing as richard as directed. Did not put in any this morning.     ROSINA Oswald 11/13/2019 9:03 AM

## 2019-11-13 NOTE — PROGRESS NOTES
1. 1-2 week post-op status post strabismus surgery:     -Surgery 11/04/2019  1. Left medial rectus recession 4.0 mm on adjustable suture- recessed an additional 2 mm during postoperative adjustment    - Alignment: looks great.  He has a flick esophoria in primary gaze  - Diplopia: patient denies though on testing he still has diplopia in right gaze. Patient very happy with outcome.  - Healing up appropriately  - Maxitrol ophthalmic ointment: stop  - Start Predforte 1% four times a day in the operative eye(s) and decrease by one drop daily every week.  Course will complete in 1 month.    Return to clinic in 3 months or sooner as needed.            Complete documentation of historical and exam elements from today's encounter can be found in the full encounter summary report (not reduplicated in this progress note).  I personally obtained the chief complaint(s) and history of present illness.  I confirmed and edited as necessary the review of systems, past medical/surgical history, family history, social history, and examination findings as documented by others; and I examined the patient myself.  I personally reviewed the relevant tests, images, and reports as documented above.  I formulated and edited as necessary the assessment and plan and discussed the findings and management plan with the patient and family     Thomas Salazar MD

## 2019-11-13 NOTE — TELEPHONE ENCOUNTER
Medication: PREDNISOLONE AC 1% OPHTH SUSP  5ML    Requested directions: PLACE 1-2 DROPS INTO LEFT EYE FOUR TIMES DAILY(DO NOT STOP UNTIL INSTRUCTED  Current directions on the medication list: : Place 1-2 drops Into the left eye 4 times daily Do not start until instructed by your surgeon at your postoperative clinic appointment    Last Written Prescription Date:  11/4/19  Last Fill Quantity: 1 bottle ,   # refills: 0    Last Office Visit: 11/13/19  Future Office visit: 2/19/20    Attending Provider: Marie  Last Clinic Note: Start Predforte 1% four times a day in the operative eye(s) and decrease by one drop daily every week.  Course will complete in 1 month.    Routing refill request to provider for review/approval because:  Not on protocol  Requires provider review  Inconsistent dose/directions    Rx pended is following directions from today's visit, please review carefully.  Thank you

## 2019-11-14 RX ORDER — PREDNISOLONE ACETATE 10 MG/ML
1 SUSPENSION/ DROPS OPHTHALMIC 4 TIMES DAILY
Qty: 5 ML | Refills: 0 | Status: SHIPPED | OUTPATIENT
Start: 2019-11-14 | End: 2024-07-26

## 2019-11-14 NOTE — TELEPHONE ENCOUNTER
Good morning Dr. Salazar,  Sorry for bothering you. I was renewing meds but wanted to run it by you. Mr. Santos is allergic to Benzalkolium Chloride which is found in pred forte. Would you like me to dispense it or change it to something else? Thank you for your help    Elsie Mora  Ophthalmology Resident, PGY-2

## 2019-12-04 ENCOUNTER — ALLIED HEALTH/NURSE VISIT (OUTPATIENT)
Dept: OPHTHALMOLOGY | Facility: CLINIC | Age: 59
End: 2019-12-04
Payer: MEDICARE

## 2019-12-04 DIAGNOSIS — R25.8 OTHER ABNORMAL INVOLUNTARY MOVEMENTS: ICD-10-CM

## 2019-12-04 DIAGNOSIS — G51.8 OTHER DISORDERS OF FACIAL NERVE: Primary | ICD-10-CM

## 2019-12-04 NOTE — PROGRESS NOTES
Assessment & Plan     Charles Santos is a 59 year old male with the following diagnoses:   1. Other disorders of facial nerve    2. Other abnormal involuntary movements         Botulinum toxin given without complication.  Patient to return in 3 months, sooner as needed.          Attending Physician Attestation:  I have seen and examined this patient.  I have confirmed and edited as necessary the chief complaint(s), history of present illness, review of systems, relevant history, and examination findings as documented by others.  I have personally reviewed the relevant tests, images, and reports as documented above.  I have confirmed and edited as necessary the assessment and plan and agree with this note.  - Jonathan Villeda MD 3:25 PM 12/4/2019

## 2020-03-13 ENCOUNTER — TELEPHONE (OUTPATIENT)
Dept: OPHTHALMOLOGY | Facility: CLINIC | Age: 60
End: 2020-03-13

## 2020-03-13 NOTE — TELEPHONE ENCOUNTER
03/13/2020 02:02 PM Phone (Outgoing) Charles Santos (Self) [phone] (H) Remove   Talked with Patient - Discussed organizations current stance with the COVID-19 outbreak. Requested that patient reschedule appointment. Patient requested to keep appointment because he is concerned that his vision seems worse. He believes he needs prism added to his glasses or possibly new glasses. Explained that we could see him back as an orthoptic only visit with a lot of flexibility for date and time and at that visit we could do a prism eval and glasses check. Patient declined because he would prefer to see Dr. Steiner to ask him questions. Given the risk of the outbreak the organization is urging patients with non-urgent conditions to reschedule. Explained that Dr. Steiner reviewed his chart directly and stated he did not feel it was necessary for patient to take the risk of coming in. Patient does believe it is necessary but verbalized his understanding of the safety risk. Kept appointment as scheduled per pt request -em 2:09 PM March 13, 2020

## 2020-03-17 ENCOUNTER — OFFICE VISIT (OUTPATIENT)
Dept: OPHTHALMOLOGY | Facility: CLINIC | Age: 60
End: 2020-03-17
Attending: OPHTHALMOLOGY
Payer: MEDICARE

## 2020-03-17 DIAGNOSIS — H53.2 DOUBLE VISION: ICD-10-CM

## 2020-03-17 DIAGNOSIS — H53.10 SUBJECTIVE VISUAL DISTURBANCE: ICD-10-CM

## 2020-03-17 DIAGNOSIS — H49.21 6TH NERVE PALSY, RIGHT: Primary | ICD-10-CM

## 2020-03-17 PROCEDURE — 92015 DETERMINE REFRACTIVE STATE: CPT | Mod: GY,ZF

## 2020-03-17 PROCEDURE — 92060 SENSORIMOTOR EXAMINATION: CPT | Mod: ZF | Performed by: OPHTHALMOLOGY

## 2020-03-17 PROCEDURE — G0463 HOSPITAL OUTPT CLINIC VISIT: HCPCS | Mod: 25,ZF

## 2020-03-17 RX ORDER — GLIPIZIDE 5 MG/1
TABLET, FILM COATED, EXTENDED RELEASE ORAL
COMMUNITY
Start: 2020-02-20 | End: 2020-11-03

## 2020-03-17 ASSESSMENT — TONOMETRY
OD_IOP_MMHG: 10
OS_IOP_MMHG: 11
IOP_METHOD: ICARE

## 2020-03-17 ASSESSMENT — REFRACTION_FINALRX
OS_HPRISM: 1 BO
OD_HPRISM: 1 BO

## 2020-03-17 ASSESSMENT — REFRACTION_WEARINGRX
OS_AXIS: 180
OD_SPHERE: +1.00
OS_ADD: +2.50
OD_CYLINDER: +0.25
OS_CYLINDER: +0.75
OD_ADD: +2.50
OS_SPHERE: +1.50
OD_AXIS: 015

## 2020-03-17 ASSESSMENT — VISUAL ACUITY
METHOD: SNELLEN - LINEAR
OS_CC+: -2
OD_CC: 20/20
OS_CC: 20/20
CORRECTION_TYPE: GLASSES
OD_CC+: -2

## 2020-03-17 ASSESSMENT — REFRACTION_MANIFEST
OD_CYLINDER: +0.50
OD_ADD: +2.25
OS_SPHERE: +1.50
OS_CYLINDER: +0.50
OD_AXIS: 160
OD_SPHERE: +1.25
OS_AXIS: 180
OS_ADD: +2.25

## 2020-03-17 ASSESSMENT — CONF VISUAL FIELD
METHOD: COUNTING FINGERS
OS_NORMAL: 1
OD_NORMAL: 1

## 2020-03-17 NOTE — LETTER
2020    RE: Charles Santos  : 1960  MRN: [id number]    Dear Providers,    I saw our mutual patient, Charles Santos, in follow-up in my clinic recently.  After a thorough neuro-ophthalmic history and examination, I came to the following conclusions:     1. 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  status post strabismus surgery 19    Prior strabismus surgery:   Left medial rectus recession 4.0 mm on adjustable suture- recessed an additional 2 mm during postoperative adjustment    Patient doing well.  Rare diplopia in primary gaze but does occasionally bother him.  If we give him 4 base out prism this would resolve diplopia in virtually all gaze positions except extreme right gaze however it then induces too large of an intermittent exotropia at near which would make him symptomatic. Mutually decided to give him 2 base out prism in bifocal which will expand his window of single binocular vision at distance but not induce diplopia at near. In future if he wanted we could give him single binocular vision glasses for near and distance with different prisms and that would alleviate his competing prism needs.    Follow-up as needed with me.  Follow-up with Dr. Villeda for further neuro-ophthalmologic care including Botox.      For further exam details, please feel free to contact our office for additional records.  If you wish to contact me regarding this patient please email me at [email or web address] or give my clinic a call to arrange a phone conversation.    Sincerely,    Thomas Salazar MD  , Neuro-Ophthalmology and Adult Strabismus Surgery  The Allan PEREZ and Chiquita Ortiz Chair in Neuro-Ophthalmology  Department of Ophthalmology and Visual Neurosciences  [facility]

## 2020-03-17 NOTE — NURSING NOTE
Chief Complaint(s) and History of Present Illness(es)     Esotropia Follow Up     Laterality: right eye    Quality: horizontal    Treatments tried: glasses and surgery    Comments: Has noticed occasional diplopia when driving, started about 2-3 weeks ago. Resolves with eyelid closure or pushing gls up. Pt is finding that he needs to push gls up to use bifocals for reading more often. Still can see fine in the distance without gls.              Comments     Pt is borderline diabetic- notes blood sugars were high and A1c was 13 about 6 weeks ago. Increased metformin and added glipizide. BS this morning was 130. Has not had A1c checked since increasing meds.

## 2020-03-18 ASSESSMENT — SLIT LAMP EXAM - LIDS
COMMENTS: NORMAL
COMMENTS: NORMAL

## 2020-03-18 ASSESSMENT — EXTERNAL EXAM - LEFT EYE: OS_EXAM: NORMAL

## 2020-03-18 NOTE — PROGRESS NOTES
1. 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  status post strabismus surgery 11/4/19    Prior strabismus surgery:   Left medial rectus recession 4.0 mm on adjustable suture- recessed an additional 2 mm during postoperative adjustment    Patient doing well.  Rare diplopia in primary gaze but does occasionally bother him.  If we give him 4 base out prism this would resolve diplopia in virtually all gaze positions except extreme right gaze however it then induces too large of an intermittent exotropia at near which would make him symptomatic.   Mutually decided to give him 2 base out prism in bifocal which will expand his window of single binocular vision at distance but not induce diplopia at near.  In future if he wanted we could give him single binocular vision glasses for near and distance with different prisms and that would alleviate his competing prism needs.    Follow-up as needed with me.  Follow-up with Dr. Villeda for further neuro-ophthalmologic care including Botox.     Complete documentation of historical and exam elements from today's encounter can be found in the full encounter summary report (not reduplicated in this progress note).  I personally obtained the chief complaint(s) and history of present illness.  I confirmed and edited as necessary the review of systems, past medical/surgical history, family history, social history, and examination findings as documented by others; and I examined the patient myself.  I personally reviewed the relevant tests, images, and reports as documented above.  I formulated and edited as necessary the assessment and plan and discussed the findings and management plan with the patient and family     Thomas Salazar MD

## 2020-06-17 ENCOUNTER — ALLIED HEALTH/NURSE VISIT (OUTPATIENT)
Dept: OPHTHALMOLOGY | Facility: CLINIC | Age: 60
End: 2020-06-17
Payer: MEDICARE

## 2020-06-17 DIAGNOSIS — R25.8 OTHER ABNORMAL INVOLUNTARY MOVEMENTS: ICD-10-CM

## 2020-06-17 DIAGNOSIS — G51.8 OTHER DISORDERS OF FACIAL NERVE: Primary | ICD-10-CM

## 2020-06-17 NOTE — PROGRESS NOTES
Assessment & Plan     Charles Santos is a 60 year old male with the following diagnoses:   1. Other disorders of facial nerve    2. Other abnormal involuntary movements         Botulinum toxin given without complication.  Patient to return in 3 months, sooner as needed.          Attending Physician Attestation:  I have seen and examined this patient.  I have confirmed and edited as necessary the chief complaint(s), history of present illness, review of systems, relevant history, and examination findings as documented by others.  I have personally reviewed the relevant tests, images, and reports as documented above.  I have confirmed and edited as necessary the assessment and plan and agree with this note.  - Jonathan Villeda MD 10:35 AM 6/17/2020

## 2020-06-30 ENCOUNTER — OFFICE VISIT (OUTPATIENT)
Dept: OPHTHALMOLOGY | Facility: CLINIC | Age: 60
End: 2020-06-30
Payer: MEDICARE

## 2020-06-30 DIAGNOSIS — H02.834 DERMATOCHALASIS OF BOTH UPPER EYELIDS: Primary | ICD-10-CM

## 2020-06-30 DIAGNOSIS — H02.233 PARALYTIC LAGOPHTHALMOS OF RIGHT EYE, UNSPECIFIED EYELID: ICD-10-CM

## 2020-06-30 DIAGNOSIS — G51.0 7TH NERVE PALSY: ICD-10-CM

## 2020-06-30 DIAGNOSIS — H02.831 DERMATOCHALASIS OF BOTH UPPER EYELIDS: Primary | ICD-10-CM

## 2020-06-30 ASSESSMENT — TONOMETRY
IOP_METHOD: ICARE
OS_IOP_MMHG: 09
OD_IOP_MMHG: 13

## 2020-06-30 ASSESSMENT — VISUAL ACUITY
CORRECTION_TYPE: GLASSES
OS_CC: 20/20
METHOD: SNELLEN - LINEAR
OS_CC+: -3
OD_CC: 20/30

## 2020-06-30 ASSESSMENT — CONF VISUAL FIELD
OS_NORMAL: 1
OD_NORMAL: 1

## 2020-06-30 ASSESSMENT — EXTERNAL EXAM - LEFT EYE: OS_EXAM: NORMAL

## 2020-06-30 ASSESSMENT — LAGOPHTHALMOS
OD_LAGOPHTHALMOS: 4
OS_LAGOPHTHALMOS: 0

## 2020-06-30 ASSESSMENT — LEVATOR FUNCTION
OD_LEVATOR: 15
OS_LEVATOR: 15

## 2020-06-30 ASSESSMENT — MARGIN REFLEX DISTANCE
OD_MRD1: 3
OS_MRD2: 5
OS_MRD1: 2
OD_MRD2: 5

## 2020-06-30 ASSESSMENT — SLIT LAMP EXAM - LIDS: COMMENTS: UL DERMATOCHALASIS

## 2020-06-30 NOTE — NURSING NOTE
Chief Complaints and History of Present Illnesses   Patient presents with     Droopy Both Upper Lids     Chief Complaint(s) and History of Present Illness(es)     Droopy Both Upper Lids     Laterality: right upper lid and left upper lid    Onset: present since trauma    Associated signs and symptoms: Negative for eye pain and dry eyes              Comments     Charles Santos is being seen today by the request of Dr. Salazar for Blepharoplasty.  Patient notes that he has to lift lids/use forehead muscles to open lids wider, he has noticed this since after having brain surgery, he feels that it isn't getting worse yet, staying stable, feels that he can see more when lifting lids. Patient notes that he had botox a few weeks ago and his RE has been tearing since then. Patient is using AT 2-3x daily.     Mirna AYALA June 30, 2020 8:29 AM

## 2020-06-30 NOTE — PROGRESS NOTES
Oculoplastic Clinic New Patient    Patient: Charles Santos MRN# [id number]   YOB: 1960 Age: 60 year old   Date of Visit: Jun 30, 2020    CC: Droopy eyelids obstructing vision.              HPI:     Chief Complaint(s) and History of Present Illness(es)     Droopy Both Upper Lids     Laterality: right upper lid and left upper lid    Onset: present since trauma    Associated signs and symptoms: Negative for eye pain and dry eyes              Comments     Charles Santos is being seen today by the request of Dr. Salazar for   Blepharoplasty.  Patient notes that he has to lift lids/use forehead   muscles to open lids wider, he has noticed this since after having brain   surgery, he feels that it isn't getting worse yet, staying stable, feels   that he can see more when lifting lids. Patient notes that he had botox a   few weeks ago and his RE has been tearing since then. Patient is using AT   2-3x daily.     Mirna Tony COT June 30, 2020 8:29 AM      The patient denies double vision, variability of the eyelid position, or dry eye symptoms.     Underwent right lower eyelid retraction repair with conjunctivoplasty, Alloderm graft, and lateral canthopexy on 6/13/2019.     EXAM:     MRD1:3mm right eye; 2mm left eye   Dermatochalasis with excess skin touching eyelashes   Brow ptosis with brow resting below superior orbital rim - NO    VISUAL FIELD:  Right eye untaped:10 degrees Right eye taped:45 degrees  Left eye untaped:10 degrees Left eye taped:45 degrees    a1c 6.5 several weeks ago at [facility] per patient.     Assessment & Plan     Charles Santos is a 60 year old male with the following diagnoses:   1. Dermatochalasis of both upper eyelids    2. Paralytic lagophthalmos of right eye, unspecified eyelid    3. 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     I would like to see him back before his next botulinum toxin  injection to select the appropriate weight. He looks good with 0.8 grams today but just two weeks out from Botox.     ANTICOAGULATION: Rivaroxaban for DVT/PE        PHOTOS DEMONSTRATE:  Significant dermatochalasis with lids resting on eyelashes and obstructing visual axis as well as paralytic lagophthalmos on the right  Blepharoptosis      Attending Physician Attestation: Complete documentation of historical and exam elements from today's encounter can be found in the full encounter summary report (not reduplicated in this progress note). I personally obtained the chief complaint(s) and history of present illness. I confirmed and edited as necessary the review of systems, past medical/surgical history, family history, social history, and examination findings as documented by others; and I examined the patient myself. I personally reviewed the relevant tests, images, and reports as documented above. I formulated and edited as necessary the assessment and plan and discussed the findings and management plan with the patient and family. I personally reviewed the ophthalmic test(s) associated with this encounter, agree with the interpretation(s) as documented by the resident/fellow, and have edited the corresponding report(s) as necessary. Radha Guerra MD

## 2020-10-09 NOTE — ANESTHESIA PREPROCEDURE EVALUATION
Anesthesia Pre-Procedure Evaluation    Patient: Charles Santos   MRN:     [id number] Gender:   male   Age:    59 year old :      1960        Preoperative Diagnosis: Eyelid Retraction and Facial Paralysis   Procedure(s):  Right Lower Eyelid Retraction Repair with Thin Alloderm  Temporary Tarsorrhaphy     Past Medical History:   Diagnosis Date     GERD (gastroesophageal reflux disease)      HIV (human immunodeficiency virus infection) (H)      HTN (hypertension)      Meningioma (H)      Personal history of PE (pulmonary embolism)     , currently on xarelto      Past Surgical History:   Procedure Laterality Date     CRANIOTOMY, SUBOCCIPITAL N/A 2018    Procedure: CRANIOTOMY, SUBOCCIPITAL;  Suboccipital crainectomy for biopsy of Right Cerebellopontine Angle Meningioma with fat graft;  Surgeon: Azeem Galvin MD;  Location: [facility] OR     DENTAL SURGERY            Anesthesia Evaluation     . Pt has had prior anesthetic.     History of anesthetic complications          ROS/MED HX    ENT/Pulmonary:     (+)tobacco use, Current use COPD, , . .    Neurologic:       Cardiovascular:     (+) Dyslipidemia, hypertension----. : . . . :. . Previous cardiac testing date:results:date: results:ECG reviewed date:2019 results:Sinus bradycardia date: results:          METS/Exercise Tolerance:  3 - Able to walk 1-2 blocks without stopping   Hematologic: Comments: Stopped Xarelto 1 week ago    (+) History of blood clots pt is anticoagulated, -      Musculoskeletal:         GI/Hepatic:         Renal/Genitourinary:         Endo:         Psychiatric:     (+) psychiatric history depression      Infectious Disease:   (+) HIV,       Malignancy:   (+) Malignancy History of Neuro          Other:    (+) H/O Chronic Pain,                       PHYSICAL EXAM:   Mental Status/Neuro: A/A/O   Airway: Facies: Feasible  Mallampati: II  Mouth/Opening: Full  TM distance: > 6 cm  Neck ROM: Full   Respiratory: Auscultation:  Lesion 1: Guidewire inserted "CTAB     Resp. Rate: Normal     Resp. Effort: Normal      CV: Rhythm: Regular  Rate: Walter  Heart: Normal Sounds   Comments:      Dental: Normal                  Lab Results   Component Value Date    WBC 7.9 04/17/2018    HGB 13.3 04/17/2018    HCT 40.2 04/17/2018     04/17/2018     04/17/2018    POTASSIUM 3.8 04/17/2018    CHLORIDE 105 04/17/2018    CO2 25 04/17/2018    BUN 29 04/17/2018    CR 0.76 04/17/2018     (H) 04/17/2018    BILL 9.2 04/17/2018    PHOS 2.6 04/15/2018    MAG 2.3 04/15/2018    ALBUMIN 3.6 03/24/2018    PROTTOTAL 7.2 03/24/2018    ALT 22 03/24/2018    AST 6 03/24/2018    ALKPHOS 78 03/24/2018    BILITOTAL 0.3 03/24/2018    PTT 29 04/02/2018    INR 0.96 04/02/2018       Preop Vitals  BP Readings from Last 3 Encounters:   06/13/19 115/61   04/10/19 112/70   03/01/19 129/62    Pulse Readings from Last 3 Encounters:   06/13/19 (!) 47   04/10/19 (!) 47   03/01/19 56      Resp Readings from Last 3 Encounters:   06/13/19 17   04/17/18 16   04/02/18 16    SpO2 Readings from Last 3 Encounters:   06/13/19 95%   04/10/19 97%   03/01/19 98%      Temp Readings from Last 1 Encounters:   06/13/19 36.4  C (97.6  F) (Oral)    Ht Readings from Last 1 Encounters:   06/13/19 1.765 m (5' 9.5\")      Wt Readings from Last 1 Encounters:   06/13/19 71.7 kg (158 lb)    Estimated body mass index is 23 kg/m  as calculated from the following:    Height as of this encounter: 1.765 m (5' 9.5\").    Weight as of this encounter: 71.7 kg (158 lb).     LDA:  Peripheral IV 04/12/18 Left Hand (Active)   Number of days: 427       Peripheral IV 06/13/19 Right Hand (Active)   Site Assessment WDL 6/13/2019 11:19 AM   Line Status Infusing 6/13/2019 11:19 AM   Phlebitis Scale 0-->no symptoms 6/13/2019 11:19 AM   Infiltration Scale 0 6/13/2019 11:19 AM   Extravasation? No 6/13/2019 11:19 AM   Number of days: 0       NG/OG Tube 10 fr Right nostril (Active)   Number of days: 426            Assessment:   ASA SCORE: 3    NPO " Status: > 6 hours since completed [employer]   Documentation: H&P complete; Preop Testing complete; Consents complete   Proceeding: Proceed without further delay  Tobacco Use:  NO Active use of Tobacco/UNKNOWN Tobacco use status     Plan:   Anes. Type:  MAC   Pre-Induction: Midazolam IV   Induction:  IV (Standard)   Airway: Native Airway   Access/Monitoring: PIV   Maintenance: Propofol; IV   Emergence: Procedure Site   Logistics: Same Day Surgery     Postop Pain/Sedation Strategy:  Standard-Options: Opioids PRN     PONV Management:  Adult Risk Factors:, Postop Opioids  Prevention: Propofol Infusion; Ondansetron; Dexamethasone     CONSENT: Direct conversation   Plan and risks discussed with: Patient   Blood Products: Consent Deferred (Minimal Blood Loss)                         Alcides Arechiga MD

## 2020-10-13 ENCOUNTER — TELEPHONE (OUTPATIENT)
Dept: OPHTHALMOLOGY | Facility: CLINIC | Age: 60
End: 2020-10-13

## 2020-10-13 ENCOUNTER — OFFICE VISIT (OUTPATIENT)
Dept: OPHTHALMOLOGY | Facility: CLINIC | Age: 60
End: 2020-10-13
Payer: COMMERCIAL

## 2020-10-13 DIAGNOSIS — Z11.59 ENCOUNTER FOR SCREENING FOR OTHER VIRAL DISEASES: Primary | ICD-10-CM

## 2020-10-13 DIAGNOSIS — H02.233 PARALYTIC LAGOPHTHALMOS OF RIGHT EYE, UNSPECIFIED EYELID: ICD-10-CM

## 2020-10-13 DIAGNOSIS — G51.0 7TH NERVE PALSY: ICD-10-CM

## 2020-10-13 DIAGNOSIS — H02.831 DERMATOCHALASIS OF BOTH UPPER EYELIDS: Primary | ICD-10-CM

## 2020-10-13 DIAGNOSIS — H02.834 DERMATOCHALASIS OF BOTH UPPER EYELIDS: Primary | ICD-10-CM

## 2020-10-13 PROCEDURE — 99213 OFFICE O/P EST LOW 20 MIN: CPT | Mod: GC | Performed by: OPHTHALMOLOGY

## 2020-10-13 ASSESSMENT — CONF VISUAL FIELD
OD_NORMAL: 1
OS_NORMAL: 1
METHOD: COUNTING FINGERS

## 2020-10-13 ASSESSMENT — SLIT LAMP EXAM - LIDS: COMMENTS: HEAVY DERMATOCHALASIS RESTING ON LASHES

## 2020-10-13 ASSESSMENT — EXTERNAL EXAM - LEFT EYE: OS_EXAM: NORMAL

## 2020-10-13 ASSESSMENT — TONOMETRY
OS_IOP_MMHG: 11
OD_IOP_MMHG: 12
IOP_METHOD: ICARE

## 2020-10-13 ASSESSMENT — VISUAL ACUITY
OD_CC: 20/20
METHOD: SNELLEN - LINEAR
OS_CC: 20/20
OD_CC+: -2

## 2020-10-13 NOTE — TELEPHONE ENCOUNTER
Met with patient to schedule surgery with Dr. Radha Guerra.    Surgery was scheduled on 11/05 at [facility]  Patient will have H&P at [facility]      Patient is aware a COVID-19 test is needed before their procedure. The test should be with-in 4 days of their procedure.   Test Details: Date 11/02  Location [facility]    Post-Op visit was scheduled on 11/17  Patient is aware a / is needed day of surgery.   Surgery packet was given, patient has my direct contact information for any further questions.

## 2020-10-13 NOTE — PROGRESS NOTES
Chief Complaint(s) and History of Present Illness(es)     Follow Up     Laterality: both eyes    Onset: years ago    Frequency: constantly    Course: stable    Treatments tried: no treatments    Pain scale: 0/10              Comments    Follow up for eval of gold weight. Last botox 6/17/2020. Pt states   scheduled for botox for Thursday- would like to know if he should keep   appt or have surgery first.     Alix Stephens, COT 8:52 AM October 13, 2020      Underwent right lower eyelid retraction repair with conjunctivoplasty, Alloderm graft, and lateral canthopexy on 6/13/2019.      EXAM:      MRD1: 4mm right eye; 2mm left eye   3mm lagophthalmos right eye.   Dermatochalasis with excess skin touching eyelashes   Brow ptosis with brow resting below superior orbital rim - NO     VISUAL FIELD (completed 6/30/20):  Right eye untaped:10          degrees  Right eye taped:45 degrees  Left eye untaped:10            degrees  Left eye taped:45 degrees      Assessment & Plan     Charles Santos is a 60 year old male with the following diagnoses:     Encounter Diagnoses   Name Primary?     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     Last botulinum toxin injection on 6/17/20.    ANTICOAGULATION: Rivaroxaban for DVT/PE     PHOTOS DEMONSTRATE:  Significant dermatochalasis with lids resting on eyelashes and obstructing visual axis as well as paralytic lagophthalmos on the right  Blepharoptosis    Trinity Ambrocio MD  Ophthalmology Resident, PGY-2  Pager: [phone]    Attending Physician Attestation: Complete documentation of historical and exam elements from today's encounter can be found in the full encounter summary report (not reduplicated in this progress note). I personally obtained the chief complaint(s) and history of present illness. I confirmed  and edited as necessary the review of systems, past medical/surgical history, family history, social history, and examination findings as documented by others; and I examined the patient myself. I personally reviewed the relevant tests, images, and reports as documented above. I formulated and edited as necessary the assessment and plan and discussed the findings and management plan with the patient.  -Radha Guerra MD

## 2020-10-13 NOTE — NURSING NOTE
Chief Complaints and History of Present Illnesses   Patient presents with     Follow Up     Chief Complaint(s) and History of Present Illness(es)     Follow Up     Laterality: both eyes    Onset: years ago    Frequency: constantly    Course: stable    Treatments tried: no treatments    Pain scale: 0/10              Comments     Follow up for eval of gold weight. Last botox 6/17/2020. Pt states scheduled for botox for Thursday- would like to know if he should keep appt or have surgery first.     Alix Stephens, JAMIE COT 8:52 AM October 13, 2020

## 2020-10-14 ENCOUNTER — TELEPHONE (OUTPATIENT)
Dept: OPHTHALMOLOGY | Facility: CLINIC | Age: 60
End: 2020-10-14

## 2020-10-14 ENCOUNTER — ALLIED HEALTH/NURSE VISIT (OUTPATIENT)
Dept: OPHTHALMOLOGY | Facility: CLINIC | Age: 60
End: 2020-10-14
Attending: OPHTHALMOLOGY
Payer: COMMERCIAL

## 2020-10-14 DIAGNOSIS — R25.8 OTHER ABNORMAL INVOLUNTARY MOVEMENTS: ICD-10-CM

## 2020-10-14 DIAGNOSIS — G51.8 OTHER DISORDERS OF FACIAL NERVE: Primary | ICD-10-CM

## 2020-10-14 PROCEDURE — 64612 DESTROY NERVE FACE MUSCLE: CPT | Mod: RT | Performed by: OPHTHALMOLOGY

## 2020-10-14 NOTE — PROGRESS NOTES
Assessment & Plan     Charles Santos is a 60 year old male with the following diagnoses:   1. Other disorders of facial nerve    2. Other abnormal involuntary movements         Botulinum toxin given without complication.  Patient to return in 3 months, sooner as needed.          Attending Physician Attestation:  I have seen and examined this patient.  I have confirmed and edited as necessary the chief complaint(s), history of present illness, review of systems, relevant history, and examination findings as documented by others.  I have personally reviewed the relevant tests, images, and reports as documented above.  I have confirmed and edited as necessary the assessment and plan and agree with this note.  - Jonathan Villeda MD 11:37 AM 10/14/2020

## 2020-11-02 DIAGNOSIS — Z11.59 ENCOUNTER FOR SCREENING FOR OTHER VIRAL DISEASES: ICD-10-CM

## 2020-11-02 LAB
SARS-COV-2 RNA SPEC QL NAA+PROBE: NOT DETECTED
SPECIMEN SOURCE: NORMAL

## 2020-11-02 PROCEDURE — U0003 INFECTIOUS AGENT DETECTION BY NUCLEIC ACID (DNA OR RNA); SEVERE ACUTE RESPIRATORY SYNDROME CORONAVIRUS 2 (SARS-COV-2) (CORONAVIRUS DISEASE [COVID-19]), AMPLIFIED PROBE TECHNIQUE, MAKING USE OF HIGH THROUGHPUT TECHNOLOGIES AS DESCRIBED BY CMS-2020-01-R: HCPCS | Performed by: PATHOLOGY

## 2020-11-05 ENCOUNTER — HOSPITAL ENCOUNTER (OUTPATIENT)
Facility: AMBULATORY SURGERY CENTER | Age: 60
Discharge: HOME | End: 2020-11-05
Attending: OPHTHALMOLOGY | Admitting: OPHTHALMOLOGY
Payer: COMMERCIAL

## 2020-11-05 ENCOUNTER — ANESTHESIA (OUTPATIENT)
Dept: SURGERY | Facility: AMBULATORY SURGERY CENTER | Age: 60
End: 2020-11-05

## 2020-11-05 ENCOUNTER — ANESTHESIA EVENT (OUTPATIENT)
Dept: SURGERY | Facility: AMBULATORY SURGERY CENTER | Age: 60
End: 2020-11-05

## 2020-11-05 VITALS
OXYGEN SATURATION: 94 % | HEIGHT: 69 IN | DIASTOLIC BLOOD PRESSURE: 53 MMHG | RESPIRATION RATE: 16 BRPM | HEART RATE: 56 BPM | TEMPERATURE: 97.6 F | SYSTOLIC BLOOD PRESSURE: 102 MMHG | WEIGHT: 152 LBS | BODY MASS INDEX: 22.51 KG/M2

## 2020-11-05 DIAGNOSIS — H02.233 PARALYTIC LAGOPHTHALMOS OF RIGHT EYE, UNSPECIFIED EYELID: ICD-10-CM

## 2020-11-05 DIAGNOSIS — H02.834 DERMATOCHALASIS OF BOTH UPPER EYELIDS: ICD-10-CM

## 2020-11-05 DIAGNOSIS — G51.0 7TH NERVE PALSY: ICD-10-CM

## 2020-11-05 DIAGNOSIS — H02.831 DERMATOCHALASIS OF BOTH UPPER EYELIDS: ICD-10-CM

## 2020-11-05 LAB
GLUCOSE BLDC GLUCOMTR-MCNC: 100 MG/DL (ref 70–99)
GLUCOSE BLDC GLUCOMTR-MCNC: 131 MG/DL (ref 70–99)

## 2020-11-05 PROCEDURE — 15823 BLEPHARP UPR EYELID XCSV SKN: CPT | Mod: XS,LT

## 2020-11-05 PROCEDURE — 67912 CORRECTION EYELID W/IMPLANT: CPT | Mod: RT | Performed by: OPHTHALMOLOGY

## 2020-11-05 PROCEDURE — 15823 BLEPHARP UPR EYELID XCSV SKN: CPT | Mod: XS | Performed by: OPHTHALMOLOGY

## 2020-11-05 PROCEDURE — 67912 CORRECTION EYELID W/IMPLANT: CPT | Mod: RT

## 2020-11-05 DEVICE — IMPLANTABLE DEVICE: Type: IMPLANTABLE DEVICE | Site: EYE | Status: FUNCTIONAL

## 2020-11-05 RX ORDER — SODIUM CHLORIDE, SODIUM LACTATE, POTASSIUM CHLORIDE, CALCIUM CHLORIDE 600; 310; 30; 20 MG/100ML; MG/100ML; MG/100ML; MG/100ML
INJECTION, SOLUTION INTRAVENOUS CONTINUOUS
Status: DISCONTINUED | OUTPATIENT
Start: 2020-11-05 | End: 2020-11-05 | Stop reason: HOSPADM

## 2020-11-05 RX ORDER — CLINDAMYCIN HCL 300 MG
300 CAPSULE ORAL 3 TIMES DAILY
Qty: 21 CAPSULE | Refills: 0 | Status: SHIPPED | OUTPATIENT
Start: 2020-11-05 | End: 2020-11-12

## 2020-11-05 RX ORDER — LIDOCAINE 40 MG/G
CREAM TOPICAL
Status: DISCONTINUED | OUTPATIENT
Start: 2020-11-05 | End: 2020-11-05 | Stop reason: HOSPADM

## 2020-11-05 RX ORDER — SODIUM CHLORIDE, SODIUM LACTATE, POTASSIUM CHLORIDE, CALCIUM CHLORIDE 600; 310; 30; 20 MG/100ML; MG/100ML; MG/100ML; MG/100ML
INJECTION, SOLUTION INTRAVENOUS CONTINUOUS PRN
Status: DISCONTINUED | OUTPATIENT
Start: 2020-11-05 | End: 2020-11-05

## 2020-11-05 RX ORDER — PRAVASTATIN SODIUM 10 MG
10 TABLET ORAL DAILY
COMMUNITY
End: 2024-07-26

## 2020-11-05 RX ORDER — ERYTHROMYCIN 5 MG/G
OINTMENT OPHTHALMIC
Qty: 3.5 G | Refills: 0 | Status: SHIPPED | OUTPATIENT
Start: 2020-11-05 | End: 2023-05-18

## 2020-11-05 RX ORDER — ERYTHROMYCIN 5 MG/G
OINTMENT OPHTHALMIC PRN
Status: DISCONTINUED | OUTPATIENT
Start: 2020-11-05 | End: 2020-11-05 | Stop reason: HOSPADM

## 2020-11-05 RX ORDER — PROPOFOL 10 MG/ML
INJECTION, EMULSION INTRAVENOUS PRN
Status: DISCONTINUED | OUTPATIENT
Start: 2020-11-05 | End: 2020-11-05

## 2020-11-05 RX ORDER — LIDOCAINE HYDROCHLORIDE 20 MG/ML
INJECTION, SOLUTION INFILTRATION; PERINEURAL PRN
Status: DISCONTINUED | OUTPATIENT
Start: 2020-11-05 | End: 2020-11-05

## 2020-11-05 RX ADMIN — SODIUM CHLORIDE, SODIUM LACTATE, POTASSIUM CHLORIDE, CALCIUM CHLORIDE: 600; 310; 30; 20 INJECTION, SOLUTION INTRAVENOUS at 11:14

## 2020-11-05 RX ADMIN — LIDOCAINE HYDROCHLORIDE 40 MG: 20 INJECTION, SOLUTION INFILTRATION; PERINEURAL at 11:29

## 2020-11-05 RX ADMIN — PROPOFOL 30 MG: 10 INJECTION, EMULSION INTRAVENOUS at 11:29

## 2020-11-05 RX ADMIN — SODIUM CHLORIDE, SODIUM LACTATE, POTASSIUM CHLORIDE, CALCIUM CHLORIDE: 600; 310; 30; 20 INJECTION, SOLUTION INTRAVENOUS at 10:02

## 2020-11-05 ASSESSMENT — COPD QUESTIONNAIRES: COPD: 1

## 2020-11-05 ASSESSMENT — LIFESTYLE VARIABLES: TOBACCO_USE: 1

## 2020-11-05 ASSESSMENT — MIFFLIN-ST. JEOR: SCORE: 1484.85

## 2020-11-05 NOTE — ANESTHESIA PREPROCEDURE EVALUATION
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]   Procedure(s):  Right upper eyelid 0.8 gram weight  left upper eyelid blepharoplasty     LABS:  CBC:   Lab Results   Component Value Date    WBC 7.9 2018    WBC 7.8 2018    HGB 13.3 2018    HGB 13.6 2018    HCT 40.2 2018    HCT 42.1 2018     2018     2018     BMP:   Lab Results   Component Value Date     2018     2018    POTASSIUM 3.8 2018    POTASSIUM 4.0 2018    CHLORIDE 105 2018    CHLORIDE 105 2018    CO2 25 2018    CO2 25 2018    BUN 29 2018    BUN 28 2018    CR 0.76 2018    CR 0.66 2018     (H) 2018     (H) 2018     COAGS:   Lab Results   Component Value Date    PTT 29 2018    INR 0.96 2018     POC:   Lab Results   Component Value Date     (H) 2019     OTHER:   Lab Results   Component Value Date    PH 7.31 (L) 2018    A1C 5.8 2018    BILL 9.2 2018    PHOS 2.6 04/15/2018    MAG 2.3 04/15/2018    ALBUMIN 3.6 2018    PROTTOTAL 7.2 2018    ALT 22 2018    AST 6 2018    ALKPHOS 78 2018    BILITOTAL 0.3 2018        Preop Vitals    BP Readings from Last 3 Encounters:   20 127/65   19 108/62   19 109/62    Pulse Readings from Last 3 Encounters:   20 56   19 55   19 51      Resp Readings from Last 3 Encounters:   20 16   19 16   19 16    SpO2 Readings from Last 3 Encounters:   20 98%   19 93%   19 94%      Temp Readings from Last 1 Encounters:   20 36.7  C (98.1  F) (Temporal)     "Ht Readings from Last 1 Encounters:   11/05/20 1.753 m (5' 9\")      Wt Readings from Last 1 Encounters:   11/05/20 68.9 kg (152 lb)    Estimated body mass index is 22.45 kg/m  as calculated from the following:    Height as of this encounter: 1.753 m (5' 9\").    Weight as of this encounter: 68.9 kg (152 lb).     LDA:  Peripheral IV 04/12/18 Left Hand (Active)   Number of days: 938       NG/OG Tube 10 fr Right nostril (Active)   Number of days: 937        Past Medical History:   Diagnosis Date     Anxiety      Borderline diabetes      GERD (gastroesophageal reflux disease)      HIV (human immunodeficiency virus infection) (H) 1987     HTN (hypertension)      Meningioma (H)      Personal history of PE (pulmonary embolism)     2005, currently on xarelto      Past Surgical History:   Procedure Laterality Date     CRANIOTOMY, SUBOCCIPITAL N/A 4/12/2018    Procedure: CRANIOTOMY, SUBOCCIPITAL;  Suboccipital crainectomy for biopsy of Right Cerebellopontine Angle Meningioma with fat graft;  Surgeon: Azeem Galvin MD;  Location: [facility] OR     DENTAL SURGERY  1990     RECESSION RESECTION WITH ADJUSTABLE SUTURE Left 11/4/2019    Procedure: Left Strabismus Repair, with Adjustable Suture;  Surgeon: Thomas Salazar MD;  Location:  OR     REPAIR RETRACTION LID Right 6/13/2019    Procedure: Right Lower Eyelid Retraction Repair with Thin Alloderm, Temporary Tarsorrhaphy;  Surgeon: Radha Guerra MD;  Location: [facility] OR     TARSORRHAPHY Right 6/13/2019    Procedure: Temporary Tarsorrhaphy;  Surgeon: Radha Guerra MD;  Location: [facility] OR      Allergies   Allergen Reactions     Benzalkonium Chloride      Nevirapine      PN: LW Reaction: Burns, Blisters  1997, Rdz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  (a side effect, not a true allergy).   Abnormal behavior.  (a side effect, not a true allergy). "   Other reaction(s): Hallucinations  Abnormal behavior.  (a side effect, not a true allergy).      Penicillins Rash     1984 1984        Anesthesia Evaluation     . Pt has had prior anesthetic. Type: General    History of anesthetic complications          ROS/MED HX    ENT/Pulmonary:     (+)tobacco use, Current use COPD, , . .    Neurologic:       Cardiovascular:     (+) hypertension----. : . . . :. . Previous cardiac testing date:results:date: results:ECG reviewed date: results:SB date: results:          METS/Exercise Tolerance:  4 - Raking leaves, gardening   Hematologic:     (+) History of blood clots pt is anticoagulated, -      Musculoskeletal:         GI/Hepatic:     (+) GERD       Renal/Genitourinary:         Endo:     (+) type II DM .      Psychiatric:         Infectious Disease:         Malignancy:         Other:                         PHYSICAL EXAM:   Mental Status/Neuro: A/A/O   Airway: Facies: Feasible  Mallampati: I  Mouth/Opening: Full  TM distance: > 6 cm  Neck ROM: Full   Respiratory: Auscultation: CTAB     Resp. Rate: Normal     Resp. Effort: Normal      CV: Rhythm: Regular  Rate: Age appropriate  Heart: Normal Sounds  Edema: None   Comments:      Dental: Normal Dentition                Assessment:   ASA SCORE: 3    H&P: History and physical reviewed and following examination; no interval change.   Smoking Status:  Non-Smoker/Unknown   NPO Status: NPO Appropriate     Plan:   Anes. Type:  MAC   Pre-Medication: Acetaminophen; Gabapentin   Induction:  N/a   Airway: Native Airway   Access/Monitoring: PIV   Maintenance: N/a     Postop Plan:   Postop Pain: None  Postop Sedation/Airway: Not planned  Disposition: Outpatient     PONV Management:   Adult Risk Factors:, Non-Smoker   Prevention: Ondansetron     CONSENT: Direct conversation   Plan and risks discussed with: Patient   Blood Products: N/a                   Nicholas Irwin DO

## 2020-11-05 NOTE — BRIEF OP NOTE
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

## 2020-11-05 NOTE — DISCHARGE INSTRUCTIONS
Post-operative Instructions  Ophthalmic Plastic and Reconstructive Surgery    Radha Guerra M.D.     All instructions apply to the operated eye(s) or eyelid(s).    Wound care and personal care  ? If a patch or bandage has been placed, please leave this in place until seen by your physician. Ensure that the bandage does not get wet when you take a shower.  ? Apply ice compresses 15 minutes of every hour while awake for 2 days. As long as there is no further bleeding, after two days, switch to warm water compresses for five minutes, four times a day until seen by your physician.   ? You may shower or wash your hair the day after surgery. Do not go swimming for at least 2 weeks to prevent contamination of your wounds.  ? Do not apply make-up to the eyes or eyelids for 2 weeks after surgery.  ? Expect some swelling, bruising, black eye (even into the lower eyelids and cheeks). Also expect serum caking, crusting and discharge from the eye and/or incisions. A small amount of surface bleeding, and depending on the type of surgery, bleeding from the inside of the eyelid, is normal for the first 48 hours.  ? Avoid straining, bending at the waist, or lifting more than 15 pounds for 10 days. Activities that raise your blood pressure can worsen swelling, cause bleeding, and breaking of sutures. Like wise, sleeping with your head slightly elevated for the first several days can help swelling resolve more quickly.   ? Do continue to ambulate (walk) as you normally would - being sedentary after surgery can cause blood clots.   ? Your eye(s) and eyelid(s) may be painful and tender. This is normal after surgery.      Contact information and follow-up  ? Return to the Eye Clinic for a follow-up appointment with your physician as scheduled. If no appointment has been scheduled:   - [facility] eye clinic: [phone] for an appointment with Dr. Guerra within 1 to 2 weeks from your date of surgery. If you are  scheduled for a phone or video visit for your first postoperative appointment, please e-mail pictures to [email or web address] 1-2 days before your appointment.   -  [facility]: [phone] for an appointment with Dr. Guerra within 1 to 2 weeks from your date of surgery.     ? For severe pain, bleeding, or loss of vision, call the [facility] at [phone] or [facility] at [phone].     After hours or on weekends and holidays, call [phone] and ask to speak with the ophthalmologist on call.    An on call person can be reached after hours for concerns. The on call doctor should not call in medication refill requests after hours or on weekends, so please plan accordingly. An effort has been made to provide adequate pain medications following every surgery, and refills will not be provided in most instances. Narcotic pain medications cannot be called in.     Activity restrictions and driving  ? Avoid heavy lifting, bending, exercise or strenuous activity for 1 week after surgery. You may resume other activities and return to work as tolerated.  ? You may not resume driving if you are using narcotic pain medications (such as Norco, Percocet, Tylenol #3).    Medications  ? Restart all regular home medications and eye drops. If you take Plavix or Aspirin on a regular basis, wait for 72 hours after your surgery before restarting these in order to decrease the risk of bleeding complications.  ? Avoid aspirin and aspirin-like medications (Motrin, Aleve, Ibuprofen, Michaela-Graff etc) for 72 hours to reduce the risk of bleeding. You may take Tylenol (acetaminophen) for pain.  ? In addition to your home medications, take the following post-operative medications as prescribed by your physician.    ? Apply antibiotic ointment to all sutures three times a day, and into the operated eye(s) at night. Use until follow up.  ? Take antibiotic capsules or  "tablets as directed.  ? In many cases, postoperative discomfort can be managed with Tylenol alone. If narcotic pain medication was prescribed, take pain pills at prescribed frequency as needed for pain.      [facility]  Home Care Following Anesthesia  For 24 hours after surgery:  1. Get plenty of rest.  A responsible adult must stay with you for at least 24 hours after you leave the surgery center.  2. Do not drive or use heavy equipment.  If you have weakness or tingling, don't drive or use heavy equipment until this feeling goes away.   3. Do not drink alcohol.   4. Avoid strenuous or risky activities.  Ask for help when climbing stairs.  5. You may feel lightheaded.  IF so, sit for a few minutes before standing.  Have someone help you get up.   6. If you have nausea (feel sick to your stomach): Drink only clear liquids such as apple juice, ginger ale, broth or 7-Up.  Rest may also help.  Be sure to drink enough fluids.  Move to a regular diet as you feel able.   7. You may have a slight fever.  Call the doctor if your fever is over 100 F (37.7 C) (taken under the tongue) or lasts longer than 24 hours.  8. You may have a dry mouth, a sore throat, muscle aches or trouble sleeping. These should go away after 24 hours.  9. Do not make important or legal decisions.        Today you received a Marcaine or bupivacaine block to numb the nerves near your surgery site.  This is a block using local anesthetic or \"numbing\" medication injected around the nerves to anesthetize or \"numb\" the area supplied by those nerves.  This block is injected into the muscle layer near your surgical site.  The medication may numb the location where you had surgery for 6-18 hours, but may last up to 24 hours.  If your surgical site is an arm or leg you should be careful with your affected limb, since it is possible to injure your limb without being aware of it due to the numbing.  Until full feeling " returns, you should guard against bumping or hitting your limb, and avoid extreme hot or cold temperatures on the skin.  As the block wears off, the feeling will return as a tingling or prickly sensation near your surgical site.  You will experience more discomfort from your incision as the feeling returns.  You may want to take a pain pill (a narcotic or Tylenol if this was prescribed by your surgeon) when you start to experience mild pain before the pain beccomes more severe.  If your pain medications do not control your pain you should notifiy your surgeon.    Tips for taking pain medications  To get the best pain relief possible, remember these points:    Take pain medications as directed, before pain becomes severe.    Pain medication can upset your stomach: taking it with food may help.    Constipation is a common side effect of pain medication. Drink plenty of  fluids.    Eat foods high in fiber. Take a stool softener if recommended by your doctor or pharmacist.    Do not drink alcohol, drive or operate machinery while taking pain medications.    Ask about other ways to control pain, such as with heat, ice or relaxation.    Tylenol/Acetaminophen Consumption  To help encourage the safe use of acetaminophen, the makers of TYLENOL  have lowered the maximum daily dose for single-ingredient Extra Strength TYLENOL  (acetaminophen) products sold in the [place] from 8 pills per day (4,000 mg) to 6 pills per day (3,000 mg). The dosing interval has also changed from 2 pills every 4-6 hours to 2 pills every 6 hours.    If you feel your pain relief is insufficient, you may take Tylenol/Acetaminophen in addition to your narcotic pain medication.     Be careful not to exceed 3,000 mg of Tylenol/Acetaminophen in a 24 hour period from all sources.    If you are taking extra strength Tylenol/acetaminophen (500 mg), the maximum dose is 6 tablets in 24 hours.    If you are taking regular strength acetaminophen (325 mg), the  maximum dose is 9 tablets in 24 hours.    Call a doctor for any of the followin. Signs of infection (fever, growing tenderness at the surgery site, a large amount of drainage or bleeding, severe pain, foul-smelling drainage, redness, swelling).  2. It has been over 8 to 10 hours since surgery and you are still not able to urinate (pass water).  3. Headache for over 24 hours.  4. Signs of Covid-19 infection (temperature over 100 degrees, shortness of breath, cough, loss of taste/smell, generalized body aches, persistent headache, chills, sore throat, nausea/vomiting/diarrhea)  Your doctor is:       Dr. Radha Guerra, Ophthalmology: [phone]               Or dial [phone] and ask for the resident on call for:  Ophthalmology  For emergency care, call the:  [facility]:  [phone] (TTY for hearing impaired: [phone])

## 2020-11-05 NOTE — ANESTHESIA POSTPROCEDURE EVALUATION
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     Anesthesia Type: MAC       Disposition: Outpatient   Postop Pain Control: Uneventful            Sign Out: Well controlled pain   PONV: No   Neuro/Psych: Uneventful            Sign Out: Acceptable/Baseline neuro status   Airway/Respiratory: Uneventful            Sign Out: Acceptable/Baseline resp. status   CV/Hemodynamics: Uneventful            Sign Out: Acceptable CV status   Other NRE: NONE   DID A NON-ROUTINE EVENT OCCUR? No         Last Anesthesia Record Vitals:  CRNA VITALS  2020 1124 - 2020 1224      2020             Pulse:  52    SpO2:  91 %    Resp Rate (set):  10      CRNA VITALS  2020 1132 - 2020 1232      2020             Pulse:  52    SpO2:  91 %    Resp Rate (set):  10          Last PACU Vitals:  Vitals Value Taken Time   /59 20 1205   Temp 36.1  C (97  F) 20 1205   Pulse     Resp 16 20 1205   SpO2 92 % 20 1205   Temp src     NIBP     Pulse     SpO2     Resp     Temp     Ht Rate     Temp 2           Electronically Signed By: Nicholas Irwin DO, 2020, 1:16 PM

## 2020-11-06 NOTE — OP NOTE
Procedure Date: 11/05/2020      PREOPERATIVE DIAGNOSES:    1.  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   2.  Left upper eyelid dermatochalasis.      POSTOPERATIVE DIAGNOSIS:     1.  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   2.  Left upper eyelid dermatochalasis.      PROCEDURES PERFORMED:     1.  Right upper eyelid placement of 0.8 gram platinum chain weight.   2.  Left upper eyelid blepharoplasty.      SURGEON:  Radha Guerra MD      ASSISTANT:  Trinity Ambrocio MD      ANESTHESIA:  Monitored anesthesia care with local infiltration of 50:50 mixture of 2% lidocaine with epinephrine and 0.5% Marcaine.      COMPLICATIONS:  None.      ESTIMATED BLOOD LOSS:  Less than 5 mL.      INDICATIONS FOR PROCEDURE:  Mr. Santos has a history of resection of an meningioma 2 years ago and has a right sixth and seventh nerve paralysis.  This has resulted in exposure keratopathy and significant ocular irritation.  Additionally, he had significant dermatochalasis on the left upper eyelid, obstructing his vision as well.  We discussed risks, benefits and alternatives of the proposed procedure, and he elected to proceed.      DESCRIPTION OF PROCEDURE:  Mr. Santos was brought to the operating room, placed supine on the operating table.  Attention was directed to the right side where excess skin was marked out.  The same was done on the left side.  Both upper eyelids were infiltrated with local anesthetic as above.  Attention was first directed to the right side where the skin incisions were made with a #15 blade and excess skin was excised with Jeimy scissors.  Hemostasis was obtained with thermal cautery.  Dissection was carried down to the superior tarsal plate.  The levator aponeurosis was dissected off of the tarsal plate and allowed to retract superiorly.  An 8 gram platinum chain was used.  This was secured to the superior edge of the tarsal plate with 6-0  nylon sutures.  The superior edge of the platinum chain was secured to the cut end of the levator aponeurosis, which had been recessed.  Orbicularis was then closed with 6-0 Vicryl sutures.  Prior to this closure, the area was irrigated with gentamicin solution.  Skin was closed with running 6-0 plain gut suture.  Attention was directed to the left side where the skin was incised with a #15 blade and the skin flap was excised with Jeimy scissors.  Hemostasis was again obtained.  The nasal fat pad was debulked conservatively.  The skin was closed with a running 6-0 plain gut suture.  He tolerated the procedure well.  Erythromycin ophthalmic ointment was applied, and he left the operating room in good condition.         SHAD RICHEY MD             D: 2020   T: 2020   MT: elsie      Name:     JESSIE OSWALD   MRN:      [id number]        Account:        [id number]   :      1960           Procedure Date: 2020      Document: [id number]

## 2020-11-17 ENCOUNTER — TELEPHONE (OUTPATIENT)
Dept: OPHTHALMOLOGY | Facility: CLINIC | Age: 60
End: 2020-11-17

## 2020-11-17 ENCOUNTER — VIRTUAL VISIT (OUTPATIENT)
Dept: OPHTHALMOLOGY | Facility: CLINIC | Age: 60
End: 2020-11-17
Payer: MEDICARE

## 2020-11-17 DIAGNOSIS — H02.834 DERMATOCHALASIS OF BOTH UPPER EYELIDS: ICD-10-CM

## 2020-11-17 DIAGNOSIS — H02.831 DERMATOCHALASIS OF BOTH UPPER EYELIDS: ICD-10-CM

## 2020-11-17 DIAGNOSIS — H02.233 PARALYTIC LAGOPHTHALMOS OF RIGHT EYE, UNSPECIFIED EYELID: Primary | ICD-10-CM

## 2020-11-17 PROCEDURE — 99024 POSTOP FOLLOW-UP VISIT: CPT | Mod: 95 | Performed by: OPHTHALMOLOGY

## 2020-11-17 NOTE — PROGRESS NOTES
Chief Complaint(s) and History of Present Illness(es)    TELEPHONE VISIT     Charles Santos is 1.5 weeks s/p left upper eyelid blepharoplasty and right uppere yelid weight 0.8 grams with skin excision. Previously underwent [facility] lower eyelid retraction repair 6/2019.    Incision(s) healing well.  He reports he is healing well and also commented that the right eye is much more comfortable. It does not feel as dry.    I have recommended:  * Continue bland lubricating ointment (eg vaseline or aquaphor) to the incision site at bedtime.   * Can massage along the incision 2-3 x daily.   * Warm soaks 2x daily until all edema and ecchymoses resolve  * Return to clinic in 6 weeks    Attending Physician Attestation:  Complete documentation of historical and exam elements from today's encounter can be found in the full encounter summary report (not reduplicated in this progress note).  I personally obtained the chief complaint(s) and history of present illness.  I confirmed and edited as necessary the review of systems, past medical/surgical history, family history, social history, and examination findings as documented by others; and I examined the patient myself.  I personally reviewed the relevant tests, images, and reports as documented above.  I formulated and edited as necessary the assessment and plan and discussed the findings and management plan with the patient and family. - Radha Guerra MD

## 2020-12-20 ENCOUNTER — HEALTH MAINTENANCE LETTER (OUTPATIENT)
Age: 60
End: 2020-12-20

## 2021-01-05 ENCOUNTER — OFFICE VISIT (OUTPATIENT)
Dept: OPHTHALMOLOGY | Facility: CLINIC | Age: 61
End: 2021-01-05
Payer: COMMERCIAL

## 2021-01-05 DIAGNOSIS — H02.233 PARALYTIC LAGOPHTHALMOS OF RIGHT EYE, UNSPECIFIED EYELID: ICD-10-CM

## 2021-01-05 DIAGNOSIS — Z98.890 POSTOPERATIVE EYE STATE: Primary | ICD-10-CM

## 2021-01-05 PROCEDURE — 99024 POSTOP FOLLOW-UP VISIT: CPT | Performed by: OPHTHALMOLOGY

## 2021-01-05 ASSESSMENT — SLIT LAMP EXAM - LIDS: COMMENTS: UPPER LID INCISION HEALING WELL

## 2021-01-05 ASSESSMENT — TONOMETRY
OD_IOP_MMHG: 9
OS_IOP_MMHG: 11
IOP_METHOD: ICARE

## 2021-01-05 ASSESSMENT — VISUAL ACUITY
CORRECTION_TYPE: GLASSES
OS_CC: 20/20
OD_CC+: -1
OD_CC: 20/25
METHOD: SNELLEN - LINEAR

## 2021-01-05 NOTE — NURSING NOTE
Chief Complaints and History of Present Illnesses   Patient presents with     Post Op (Ophthalmology) Both Eyes     Chief Complaint(s) and History of Present Illness(es)     Post Op (Ophthalmology) Both Eyes     Laterality: both eyes    Onset: 2 months ago    Frequency: constantly    Course: stable    Treatments tried: artificial tears    Pain scale: 0/10              Comments     S/p Right upper eyelid placement of 0.8 gram platinum chain weight and Left upper eyelid blepharoplasty on 11/5/2020. Pt states he is happy with lid positioning, was concerned in the beginning but is happy now. Pt states right eye feels cold in the winter. No pain. AT PRN each eye.    JAMIE Soriano COT 8:41 AM January 5, 2021

## 2021-01-05 NOTE — PROGRESS NOTES
Chief Complaint(s) and History of Present Illness(es)     Post Op (Ophthalmology) Both Eyes     Laterality: both eyes    Onset: 2 months ago    Frequency: constantly    Course: stable    Treatments tried: artificial tears    Pain scale: 0/10              Comments     S/p Right upper eyelid placement of 0.8 gram platinum chain weight and   Left upper eyelid blepharoplasty on 11/5/2020. Pt states he is happy with   lid positioning, was concerned in the beginning but is happy now. Pt   states right eye feels cold in the winter. No pain. AT PRN each eye.    Alix Stephens, COT COT 8:41 AM January 5, 2021                   Assessment & Plan     Charles Santos is a 60 year old male with the following diagnoses:   Encounter Diagnoses   Name Primary?     Postoperative eye state Yes     Paralytic lagophthalmos of right eye, unspecified eyelid      POM2 s/p right upper lid platinum weight and left upper lid blepharoplasty.  Healing well, right eye closure much improved, still with 2-3mm lagophthalmos but ok Bell's and corneal protection.  Cornea looks good. He is happy with results so far and reports his eyes feel comfortable.  Using artificial tears as needed for comfort.    Plan:  - continue lubrication  - RTC plastics prn  - RTC w/ Dr. Villeda for Botox on 1/27/21      Baudilio Mistry MD/MPH  Oculoplastic Surgery Fellow  1/5/2021 at 8:53 AM    Agree with above.   6th and 7th nerve palsy s/p resection of meningioma.  Right lower lid retraction repair with Alloderm 6/2019 (AM)  Strab surgery 11/2019 ([facility])  Right upper lid platinum chain 11/2020 (AM).    Doing well, still mild lagophthalmos, nice symmetry with eyes open. Cornea looks good and subjectively symptoms improved.   Continue to f/u with Dr. Villeda for botulinum toxin injections, return to me if issues with lid position.     Attending Physician Attestation: Complete documentation of historical and exam elements from today's encounter can be found in the full  encounter summary report (not reduplicated in this progress note). I personally obtained the chief complaint(s) and history of present illness. I confirmed and edited as necessary the review of systems, past medical/surgical history, family history, social history, and examination findings as documented by others; and I examined the patient myself. I personally reviewed the relevant tests, images, and reports as documented above. I formulated and edited as necessary the assessment and plan and discussed the findings and management plan with the patient.  -Radha Guerra MD

## 2021-01-27 ENCOUNTER — ALLIED HEALTH/NURSE VISIT (OUTPATIENT)
Dept: OPHTHALMOLOGY | Facility: CLINIC | Age: 61
End: 2021-01-27
Payer: COMMERCIAL

## 2021-01-27 ENCOUNTER — TELEPHONE (OUTPATIENT)
Dept: OPHTHALMOLOGY | Facility: CLINIC | Age: 61
End: 2021-01-27

## 2021-01-27 DIAGNOSIS — G51.8 OTHER DISORDERS OF FACIAL NERVE: Primary | ICD-10-CM

## 2021-01-27 PROCEDURE — 64612 DESTROY NERVE FACE MUSCLE: CPT | Mod: 79 | Performed by: OPHTHALMOLOGY

## 2021-01-27 NOTE — PROGRESS NOTES
Assessment & Plan     Charles Santos is a 60 year old male with the following diagnoses:   1. Other disorders of facial nerve         Botulinum toxin given without complication.  Patient to return in 3 months, sooner as needed.          Attending Physician Attestation:  I have seen and examined this patient.  I have confirmed and edited as necessary the chief complaint(s), history of present illness, review of systems, relevant history, and examination findings as documented by others.  I have personally reviewed the relevant tests, images, and reports as documented above.  I have confirmed and edited as necessary the assessment and plan and agree with this note.  - Jonathan Villeda MD 11:16 AM 1/27/2021

## 2021-04-15 ENCOUNTER — TELEPHONE (OUTPATIENT)
Dept: OPHTHALMOLOGY | Facility: CLINIC | Age: 61
End: 2021-04-15

## 2021-04-15 NOTE — TELEPHONE ENCOUNTER
Spoke to patient.  Change in his insurance coverage and his diagnosis is no longer covered.  Will check with our financial group to see if we can do anything     Isidra Blevins on 4/15/2021 at 11:03 AM

## 2021-04-26 ENCOUNTER — TELEPHONE (OUTPATIENT)
Dept: OPHTHALMOLOGY | Facility: CLINIC | Age: 61
End: 2021-04-26

## 2021-04-26 NOTE — CONFIDENTIAL NOTE
Left message for patient.  We will need to do letter of medical necessity for patient to get botox as our authorization for off label use was denied.  Cancel appointment until we get approval.     Isidra Blevins on 4/26/2021 at 3:05 PM

## 2021-05-19 ENCOUNTER — ALLIED HEALTH/NURSE VISIT (OUTPATIENT)
Dept: OPHTHALMOLOGY | Facility: CLINIC | Age: 61
End: 2021-05-19
Payer: COMMERCIAL

## 2021-05-19 ENCOUNTER — TELEPHONE (OUTPATIENT)
Dept: OPHTHALMOLOGY | Facility: CLINIC | Age: 61
End: 2021-05-19

## 2021-05-19 DIAGNOSIS — G51.8 OTHER DISORDERS OF FACIAL NERVE: Primary | ICD-10-CM

## 2021-05-19 PROCEDURE — 64612 DESTROY NERVE FACE MUSCLE: CPT | Performed by: OPHTHALMOLOGY

## 2021-05-19 NOTE — PROGRESS NOTES
Assessment & Plan     Charles Santos is a 61 year old male with the following diagnoses:   1. Other disorders of facial nerve         Botulinum toxin given without complication.  Patient to return in 3 months, sooner as needed.          Attending Physician Attestation:  I have seen and examined this patient.  I have confirmed and edited as necessary the chief complaint(s), history of present illness, review of systems, relevant history, and examination findings as documented by others.  I have personally reviewed the relevant tests, images, and reports as documented above.  I have confirmed and edited as necessary the assessment and plan and agree with this note.  - Jonathan Villeda MD 11:22 AM 5/19/2021

## 2021-05-19 NOTE — TELEPHONE ENCOUNTER
LVM for patient regarding scheduling a Botox Injection- Return in 3 months (on 8/19/2021). Provided direct number for scheduling.

## 2021-05-21 ENCOUNTER — TELEPHONE (OUTPATIENT)
Dept: OPHTHALMOLOGY | Facility: CLINIC | Age: 61
End: 2021-05-21

## 2021-05-21 NOTE — TELEPHONE ENCOUNTER
Patient returned VM regarding scheduling a Botox Injection- Return in 3 months (on 8/19/2021). Scheduled patient accordingly and sent appointment letter to patient.-Per Patient

## 2021-08-08 ENCOUNTER — HEALTH MAINTENANCE LETTER (OUTPATIENT)
Age: 61
End: 2021-08-08

## 2021-08-18 ENCOUNTER — ALLIED HEALTH/NURSE VISIT (OUTPATIENT)
Dept: OPHTHALMOLOGY | Facility: CLINIC | Age: 61
End: 2021-08-18
Payer: COMMERCIAL

## 2021-08-18 DIAGNOSIS — G51.8 OTHER DISORDERS OF FACIAL NERVE: Primary | ICD-10-CM

## 2021-08-18 PROCEDURE — 64612 DESTROY NERVE FACE MUSCLE: CPT | Mod: RT | Performed by: OPHTHALMOLOGY

## 2021-08-18 NOTE — PROGRESS NOTES
Assessment & Plan     Charles Santos is a 61 year old male with the following diagnoses:   1. Other disorders of facial nerve         Botulinum toxin given without complication.  Patient to return in 3 months, sooner as needed.          Attending Physician Attestation:  I have seen and examined this patient.  I have confirmed and edited as necessary the chief complaint(s), history of present illness, review of systems, relevant history, and examination findings as documented by others.  I have personally reviewed the relevant tests, images, and reports as documented above.  I have confirmed and edited as necessary the assessment and plan and agree with this note.  - Jonathan Villeda MD 2:30 PM 8/18/2021

## 2021-08-24 ENCOUNTER — TELEPHONE (OUTPATIENT)
Dept: OPHTHALMOLOGY | Facility: CLINIC | Age: 61
End: 2021-08-24

## 2021-08-24 ENCOUNTER — OFFICE VISIT (OUTPATIENT)
Dept: OPHTHALMOLOGY | Facility: CLINIC | Age: 61
End: 2021-08-24
Payer: COMMERCIAL

## 2021-08-24 DIAGNOSIS — G51.0 7TH NERVE PALSY: Primary | ICD-10-CM

## 2021-08-24 DIAGNOSIS — H02.233 PARALYTIC LAGOPHTHALMOS OF RIGHT EYE, UNSPECIFIED EYELID: ICD-10-CM

## 2021-08-24 DIAGNOSIS — H02.539 EYELID RETRACTION OR LAG: ICD-10-CM

## 2021-08-24 PROCEDURE — 99214 OFFICE O/P EST MOD 30 MIN: CPT | Mod: 24 | Performed by: OPHTHALMOLOGY

## 2021-08-24 ASSESSMENT — CONF VISUAL FIELD
METHOD: COUNTING FINGERS
OD_NORMAL: 1
OS_NORMAL: 1

## 2021-08-24 ASSESSMENT — VISUAL ACUITY
OD_CC: 20/25
METHOD: SNELLEN - LINEAR
OS_CC: 20/20
CORRECTION_TYPE: GLASSES
OD_CC+: -2

## 2021-08-24 ASSESSMENT — TONOMETRY
IOP_METHOD: ICARE
OS_IOP_MMHG: 7
OD_IOP_MMHG: 7

## 2021-08-24 ASSESSMENT — EXTERNAL EXAM - LEFT EYE: OS_EXAM: NORMAL

## 2021-08-24 ASSESSMENT — SLIT LAMP EXAM - LIDS: COMMENTS: NORMAL

## 2021-08-24 NOTE — NURSING NOTE
Chief Complaints and History of Present Illnesses   Patient presents with     Consult For     RLL repair     Chief Complaint(s) and History of Present Illness(es)     Consult For     Laterality: right eye    Onset: months ago    Frequency: constantly    Course: gradually worsening    Associated symptoms: tearing and discharge    Treatments tried: no treatments    Pain scale: 0/10    Comments: RLL repair              Comments     Charles Santos is being seen today at the request of Jonathan Villeda for Right lowelid eval. Pt states right lower lid has been drooping, and causing tearing and discharge in the right eye. Pt has had gold weight placed on right upperlid. No pain.     Alix Stephens, COT COT 8:28 AM August 24, 2021

## 2021-08-24 NOTE — TELEPHONE ENCOUNTER
Spoke with patient to schedule surgery with Dr. Guerra    Surgery was scheduled on 9/30 at [facility]  Patient will have H&P at [facility]     Patient is aware a COVID-19 test is needed before their procedure. The test should be with-in 4 days of their procedure.   Test Details: Date 9/27 Location [facility]    Post-Op visit was scheduled on 10/5  Patient is aware a / is needed day of surgery.   Surgery packet was mailed 8/24, patient has my direct contact information for any further questions.

## 2021-08-24 NOTE — PROGRESS NOTES
Chief Complaint(s) and History of Present Illness(es)     Consult For     Laterality: right eye    Onset: months ago    Frequency: constantly    Course: gradually worsening    Associated symptoms: tearing and discharge    Treatments tried: no treatments    Pain scale: 0/10    Comments: RLL repair              Comments     Charles Santos is being seen today at the request of Jonathan Villeda for Right lowelid eval. Pt states right lower lid has been drooping,   and causing tearing and discharge in the right eye. Pt has had gold weight   placed on right upperlid. No pain.     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 Paralytic lagophthalmos of right eye, unspecified eyelid    3. Eyelid retraction or lag    - 6/2019 Right lower retraction repair with conjunctivoplasty, alloderm graft, and lateral canthopexy  - 11/2019 Left medial rectus recession   - 11/2020 Right upper eyelid 0.8g chain placement and left upper blepharoplasty  - Patient has history of 6th and 7th nerve palsy s/p resection of meningioma as well as facial synkinesis of the right eye for which he receives botox injections with Dr. Villeda  - Complains to day of discharge from right eye throughout the day and itching in his right eye, as well as tearing throughout the day and even at night. Uses AT BID with some brief relief.        Patient disposition:   No follow-ups on file.          Amber Bean MD  Resident Physician - PGY2  Department of Ophthalmology   [facility]    A1c: 7.0  On Xarelto    Plan: Right lower eyelid retraction repair (LTS + thick alloderm + temporary tarso)    Discussed with lack of facial nerve function, he has minimal midface support, and I would expect recurrent lower eyelid retraction over the years.   Attending Physician Attestation: Complete documentation of historical  and exam elements from today's encounter can be found in the full encounter summary report (not reduplicated in this progress note). I personally obtained the chief complaint(s) and history of present illness. I confirmed and edited as necessary the review of systems, past medical/surgical history, family history, social history, and examination findings as documented by others; and I examined the patient myself. I personally reviewed the relevant tests, images, and reports as documented above. I formulated and edited as necessary the assessment and plan and discussed the findings and management plan with the patient.  -Radha Guerra MD    Today with Charles Santos , I reviewed the indications, risks, benefits, and alternatives of the proposed surgical procedure including, but not limited to, failure obtain the desired result  and need for additional surgery, bleeding, infection, injury to they eye, and the remote possibility of permanent damage to any organ system or death with the use of anesthesia.  I provided multiple opportunities for the questions, answered all questions to the best of my ability, and confirmed that my answers and my discussion were understood.   Radha Guerra MD

## 2021-08-24 NOTE — LETTER
2021         RE:  :  MRN: Charles Santos  1960  [id number]     Dear Dr. Vlileda,    Your patient, Charles Santos, returned for oculoplastic follow up. My assessment and plan are below.  For further details, please see my attached clinic note.      Chief Complaint(s) and History of Present Illness(es)     Consult For     Laterality: right eye    Onset: months ago    Frequency: constantly    Course: gradually worsening    Associated symptoms: tearing and discharge    Treatments tried: no treatments    Pain scale: 0/10    Comments: RLL repair              Comments     Charles Satnos is being seen today at the request of Jonathan Villeda for Right lowelid eval. Pt states right lower lid has been drooping,   and causing tearing and discharge in the right eye. Pt has had gold weight   placed on right upperlid. No pain.     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 Paralytic lagophthalmos of right eye, unspecified eyelid    - 2019 Right lower retraction repair with conjunctivoplasty, alloderm graft, and lateral canthopexy  - 2019 Left medial rectus recession   - 2020 Right upper eyelid 0.8g chain placement and left upper blepharoplasty  - Patient has history of 6th and 7th nerve palsy s/p resection of meningioma as well as facial synkinesis of the right eye for which he receives botox injections with Dr. Villeda  - Complains to day of discharge from right eye throughout the day and itching in his right eye, as well as tearing throughout the day and even at night. Uses AT BID with some brief relief.        Patient disposition:   No follow-ups on file.     Amber Bean MD  Resident Physician - PGY2  Department of Ophthalmology   [facility]    A1c: 7.0  On Xarelto    Plan: Right lower eyelid retraction repair (LTS + thick alloderm + temporary  tarso)    Discussed with lack of facial nerve function, he has minimal midface support, and I would expect recurrent lower eyelid retraction over the years    Again, thank you for allowing me to participate in the care of your patient.      Sincerely,    Radha Guerra MD  Department of Ophthalmology and Visual Neurosciences  [facility]      CC: Jonathan Villeda MD  [address]  [facility] [address]  Via In Basket

## 2021-09-02 DIAGNOSIS — Z11.59 ENCOUNTER FOR SCREENING FOR OTHER VIRAL DISEASES: ICD-10-CM

## 2021-09-23 NOTE — TELEPHONE ENCOUNTER
Per epic in basket message.    Patient requested to have his eye procedure rescheduled.    Called patient to reschedule procedure with Dr. Butcher, there was no answer.  Left message with my direct line [phone].

## 2021-09-24 NOTE — TELEPHONE ENCOUNTER
Spoke with patient to schedule surgery with Dr. Guerra    Surgery was scheduled on 10/7 at [facility]  Patient will have H&P at [facility]     Patient is aware a COVID-19 test is needed before their procedure. The test should be with-in 4 days of their procedure.   Test Details: Date 10/4 Location [facility]    Post-Op visit was scheduled on 10/12  Patient is aware a / is needed day of surgery.   Surgery packet was mailed 9/24, patient has my direct contact information for any further questions.

## 2021-09-30 ENCOUNTER — TRANSFERRED RECORDS (OUTPATIENT)
Dept: HEALTH INFORMATION MANAGEMENT | Facility: CLINIC | Age: 61
End: 2021-09-30

## 2021-10-03 ENCOUNTER — HEALTH MAINTENANCE LETTER (OUTPATIENT)
Age: 61
End: 2021-10-03

## 2021-10-06 ENCOUNTER — ANESTHESIA EVENT (OUTPATIENT)
Dept: SURGERY | Facility: AMBULATORY SURGERY CENTER | Age: 61
End: 2021-10-06
Payer: COMMERCIAL

## 2021-10-07 ENCOUNTER — ANESTHESIA (OUTPATIENT)
Dept: SURGERY | Facility: AMBULATORY SURGERY CENTER | Age: 61
End: 2021-10-07
Payer: COMMERCIAL

## 2021-10-07 ENCOUNTER — HOSPITAL ENCOUNTER (OUTPATIENT)
Facility: AMBULATORY SURGERY CENTER | Age: 61
End: 2021-10-07
Attending: OPHTHALMOLOGY
Payer: COMMERCIAL

## 2021-10-07 VITALS
HEIGHT: 69 IN | WEIGHT: 162 LBS | TEMPERATURE: 97.4 F | DIASTOLIC BLOOD PRESSURE: 70 MMHG | BODY MASS INDEX: 23.99 KG/M2 | SYSTOLIC BLOOD PRESSURE: 132 MMHG | HEART RATE: 65 BPM | OXYGEN SATURATION: 96 % | RESPIRATION RATE: 16 BRPM

## 2021-10-07 DIAGNOSIS — H02.539 EYELID RETRACTION OR LAG: ICD-10-CM

## 2021-10-07 DIAGNOSIS — G51.0 7TH NERVE PALSY: ICD-10-CM

## 2021-10-07 DIAGNOSIS — H02.233 PARALYTIC LAGOPHTHALMOS OF RIGHT EYE, UNSPECIFIED EYELID: ICD-10-CM

## 2021-10-07 LAB — GLUCOSE BLDC GLUCOMTR-MCNC: 152 MG/DL (ref 70–99)

## 2021-10-07 PROCEDURE — 82962 GLUCOSE BLOOD TEST: CPT | Performed by: PATHOLOGY

## 2021-10-07 PROCEDURE — 67911 REVISE EYELID DEFECT: CPT | Mod: E4 | Performed by: OPHTHALMOLOGY

## 2021-10-07 PROCEDURE — 67911 REVISE EYELID DEFECT: CPT | Mod: E4

## 2021-10-07 PROCEDURE — 68320 REVISE/GRAFT EYELID LINING: CPT | Mod: RT | Performed by: OPHTHALMOLOGY

## 2021-10-07 PROCEDURE — 67950 REVISION OF EYELID: CPT | Mod: RT

## 2021-10-07 PROCEDURE — 67950 REVISION OF EYELID: CPT | Mod: RT | Performed by: OPHTHALMOLOGY

## 2021-10-07 PROCEDURE — 67875 CLOSURE OF EYELID BY SUTURE: CPT | Mod: RT | Performed by: OPHTHALMOLOGY

## 2021-10-07 PROCEDURE — 15275 SKIN SUB GRAFT FACE/NK/HF/G: CPT | Mod: GC | Performed by: OPHTHALMOLOGY

## 2021-10-07 PROCEDURE — 15275 SKIN SUB GRAFT FACE/NK/HF/G: CPT

## 2021-10-07 PROCEDURE — 67875 CLOSURE OF EYELID BY SUTURE: CPT | Mod: RT

## 2021-10-07 PROCEDURE — 68320 REVISE/GRAFT EYELID LINING: CPT | Mod: RT

## 2021-10-07 DEVICE — GRAFT ALLODERM 1X4CM CHARGE PER SQ CM= 4 UNITS: Type: IMPLANTABLE DEVICE | Site: EYE | Status: FUNCTIONAL

## 2021-10-07 RX ORDER — SODIUM CHLORIDE, SODIUM LACTATE, POTASSIUM CHLORIDE, CALCIUM CHLORIDE 600; 310; 30; 20 MG/100ML; MG/100ML; MG/100ML; MG/100ML
INJECTION, SOLUTION INTRAVENOUS CONTINUOUS
Status: DISCONTINUED | OUTPATIENT
Start: 2021-10-07 | End: 2021-10-07 | Stop reason: HOSPADM

## 2021-10-07 RX ORDER — TETRACAINE HYDROCHLORIDE 5 MG/ML
SOLUTION OPHTHALMIC PRN
Status: DISCONTINUED | OUTPATIENT
Start: 2021-10-07 | End: 2021-10-07 | Stop reason: HOSPADM

## 2021-10-07 RX ORDER — OXYCODONE HYDROCHLORIDE 5 MG/1
5 TABLET ORAL EVERY 4 HOURS PRN
Status: DISCONTINUED | OUTPATIENT
Start: 2021-10-07 | End: 2021-10-08 | Stop reason: HOSPADM

## 2021-10-07 RX ORDER — MEPERIDINE HYDROCHLORIDE 25 MG/ML
12.5 INJECTION INTRAMUSCULAR; INTRAVENOUS; SUBCUTANEOUS
Status: DISCONTINUED | OUTPATIENT
Start: 2021-10-07 | End: 2021-10-08 | Stop reason: HOSPADM

## 2021-10-07 RX ORDER — SODIUM CHLORIDE, SODIUM LACTATE, POTASSIUM CHLORIDE, CALCIUM CHLORIDE 600; 310; 30; 20 MG/100ML; MG/100ML; MG/100ML; MG/100ML
INJECTION, SOLUTION INTRAVENOUS CONTINUOUS
Status: DISCONTINUED | OUTPATIENT
Start: 2021-10-07 | End: 2021-10-08 | Stop reason: HOSPADM

## 2021-10-07 RX ORDER — ONDANSETRON 2 MG/ML
INJECTION INTRAMUSCULAR; INTRAVENOUS PRN
Status: DISCONTINUED | OUTPATIENT
Start: 2021-10-07 | End: 2021-10-07

## 2021-10-07 RX ORDER — FENTANYL CITRATE 50 UG/ML
25 INJECTION, SOLUTION INTRAMUSCULAR; INTRAVENOUS EVERY 5 MIN PRN
Status: DISCONTINUED | OUTPATIENT
Start: 2021-10-07 | End: 2021-10-07 | Stop reason: HOSPADM

## 2021-10-07 RX ORDER — OXYCODONE HYDROCHLORIDE 5 MG/1
5 TABLET ORAL EVERY 6 HOURS PRN
Qty: 12 TABLET | Refills: 0 | Status: SHIPPED | OUTPATIENT
Start: 2021-10-07 | End: 2021-10-10

## 2021-10-07 RX ORDER — DEXAMETHASONE SODIUM PHOSPHATE 4 MG/ML
INJECTION, SOLUTION INTRA-ARTICULAR; INTRALESIONAL; INTRAMUSCULAR; INTRAVENOUS; SOFT TISSUE PRN
Status: DISCONTINUED | OUTPATIENT
Start: 2021-10-07 | End: 2021-10-07

## 2021-10-07 RX ORDER — ONDANSETRON 2 MG/ML
4 INJECTION INTRAMUSCULAR; INTRAVENOUS EVERY 30 MIN PRN
Status: DISCONTINUED | OUTPATIENT
Start: 2021-10-07 | End: 2021-10-08 | Stop reason: HOSPADM

## 2021-10-07 RX ORDER — ONDANSETRON 4 MG/1
4 TABLET, ORALLY DISINTEGRATING ORAL EVERY 30 MIN PRN
Status: DISCONTINUED | OUTPATIENT
Start: 2021-10-07 | End: 2021-10-08 | Stop reason: HOSPADM

## 2021-10-07 RX ORDER — ACETAMINOPHEN 325 MG/1
975 TABLET ORAL ONCE
Status: COMPLETED | OUTPATIENT
Start: 2021-10-07 | End: 2021-10-07

## 2021-10-07 RX ORDER — LIDOCAINE 40 MG/G
CREAM TOPICAL
Status: DISCONTINUED | OUTPATIENT
Start: 2021-10-07 | End: 2021-10-07 | Stop reason: HOSPADM

## 2021-10-07 RX ORDER — PROPOFOL 10 MG/ML
INJECTION, EMULSION INTRAVENOUS CONTINUOUS PRN
Status: DISCONTINUED | OUTPATIENT
Start: 2021-10-07 | End: 2021-10-07

## 2021-10-07 RX ORDER — ERYTHROMYCIN 5 MG/G
OINTMENT OPHTHALMIC PRN
Status: DISCONTINUED | OUTPATIENT
Start: 2021-10-07 | End: 2021-10-07 | Stop reason: HOSPADM

## 2021-10-07 RX ORDER — LIDOCAINE HYDROCHLORIDE 20 MG/ML
INJECTION, SOLUTION INFILTRATION; PERINEURAL PRN
Status: DISCONTINUED | OUTPATIENT
Start: 2021-10-07 | End: 2021-10-07

## 2021-10-07 RX ORDER — PROPOFOL 10 MG/ML
INJECTION, EMULSION INTRAVENOUS PRN
Status: DISCONTINUED | OUTPATIENT
Start: 2021-10-07 | End: 2021-10-07

## 2021-10-07 RX ORDER — ERYTHROMYCIN 5 MG/G
OINTMENT OPHTHALMIC
Qty: 3.5 G | Refills: 0 | Status: SHIPPED | OUTPATIENT
Start: 2021-10-07 | End: 2023-05-18

## 2021-10-07 RX ADMIN — DEXAMETHASONE SODIUM PHOSPHATE 4 MG: 4 INJECTION, SOLUTION INTRA-ARTICULAR; INTRALESIONAL; INTRAMUSCULAR; INTRAVENOUS; SOFT TISSUE at 12:55

## 2021-10-07 RX ADMIN — PROPOFOL 50 MCG/KG/MIN: 10 INJECTION, EMULSION INTRAVENOUS at 12:51

## 2021-10-07 RX ADMIN — PROPOFOL 40 MG: 10 INJECTION, EMULSION INTRAVENOUS at 12:53

## 2021-10-07 RX ADMIN — ACETAMINOPHEN 975 MG: 325 TABLET ORAL at 10:55

## 2021-10-07 RX ADMIN — ONDANSETRON 4 MG: 2 INJECTION INTRAMUSCULAR; INTRAVENOUS at 12:55

## 2021-10-07 RX ADMIN — PROPOFOL 20 MG: 10 INJECTION, EMULSION INTRAVENOUS at 12:58

## 2021-10-07 RX ADMIN — LIDOCAINE HYDROCHLORIDE 100 MG: 20 INJECTION, SOLUTION INFILTRATION; PERINEURAL at 12:51

## 2021-10-07 RX ADMIN — SODIUM CHLORIDE, SODIUM LACTATE, POTASSIUM CHLORIDE, CALCIUM CHLORIDE: 600; 310; 30; 20 INJECTION, SOLUTION INTRAVENOUS at 10:55

## 2021-10-07 ASSESSMENT — COPD QUESTIONNAIRES: COPD: 1

## 2021-10-07 ASSESSMENT — MIFFLIN-ST. JEOR: SCORE: 1525.21

## 2021-10-07 NOTE — OP NOTE
PREOPERATIVE DIAGNOSIS: Right lower eyelid retraction.   POSTOPERATIVE DIAGNOSIS: Right  lower eyelid retraction.   PROCEDURE PERFORMED: Right lower eyelid retraction repair with conjunctivoplasty, Alloderm (acellular human dermal matrix) graft, lateral canthopexy and temporary Paris suture tarsorrhaphy.   SURGEON: Radha Guerra MD, MD.   ASSISTANTS: Chelle Rand MD  ANESTHESIA: Monitored with local infiltration of a 50/50 mixture of 2% lidocaine with epinephrine and 0.5% Marcaine.   COMPLICATIONS: None.   ESTIMATED BLOOD LOSS: Less than 5 mL.   HISTORY OF PRESENT ILLNESS: Charles Santos  presented with right lower lid retraction leading to exposure keratitis. After the risks, benefits and alternatives to the proposed procedure were explained, informed consent was obtained.   DESCRIPTION OF PROCEDURE: Charles Santos was brought to the operating room and placed supine on the operating table.  The right  lower lid lateral canthus, upper lid and brow were infiltrated with local anesthetic. Anesthesia was infiltrated. The area prepped and draped in the typical sterile ophthalmic fashion. Attention was directed to the right side. Lateral canthal incision was made with a 15-blade and dissection carried down through the orbicularis with monopolar cautery. Lateral canthotomy and inferior cantholysis was performed. Transconjunctival incision was performed with cautery and Jeimy scissors at the inferior tarsal border releasing the lower eyelid retractors and conjunctiva. The Alloderm acellular dermis graft was soaked in gentamicin solution, trimmed to fit in the lower lid conjunctival defect and sutured in place with running 6-0 plain gut sutures, securing the graft to the inferior tarsal border superiorly and inferiorly to the inferior conjunctival edge. The lateral tarsus was reattached to the lateral orbital rim periosteum with a double-armed 5-0 PDS suture in a horizontal mattress fashion lateral canthopexy.  Lateral canthus was reconstructed with a 6-0 Vicryl suture. Skin was closed with interrupted 6-0 plain gut sutures. A 5-0 Prolene suture was placed in a Frost suture technique through the lower lid, upper lid and brow and tied over a Telfa bolster. Charles Santos  tolerated the procedure well and was taken to recovery room in stable condition.     Radha Guerra MD

## 2021-10-07 NOTE — BRIEF OP NOTE
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  Complications: None.  Implants:   Implant Name Type Inv. Item Serial No.  Lot No. LRB No. Used Action   GRAFT DERMAL ALLODERM 1X4CM CHARGE PER SQ CM= 4 UNITS - [id number] Bone/Tissue/Biologic GRAFT DERMAL ALLODERM 1X4CM CHARGE PER SQ CM= 4 UNITS  [employer] [id number] Right 1 Implanted

## 2021-10-07 NOTE — DISCHARGE INSTRUCTIONS
"[facility]  Home Care Following Anesthesia  For 24 hours after surgery:  1. Get plenty of rest.  A responsible adult must stay with you for at least 24 hours after you leave the surgery center.  2. Do not drive or use heavy equipment.  If you have weakness or tingling, don't drive or use heavy equipment until this feeling goes away.   3. Do not drink alcohol.   4. Avoid strenuous or risky activities.  Ask for help when climbing stairs.  5. You may feel lightheaded.  IF so, sit for a few minutes before standing.  Have someone help you get up.   6. If you have nausea (feel sick to your stomach): Drink only clear liquids such as apple juice, ginger ale, broth or 7-Up.  Rest may also help.  Be sure to drink enough fluids.  Move to a regular diet as you feel able.   7. You may have a slight fever.  Call the doctor if your fever is over 100 F (37.7 C) (taken under the tongue) or lasts longer than 24 hours.  8. You may have a dry mouth, a sore throat, muscle aches or trouble sleeping. These should go away after 24 hours.  9. Do not make important or legal decisions.   10. It is recommended to avoid smoking.        Today you received a Marcaine or bupivacaine block to numb the nerves near your surgery site.  This is a block using local anesthetic or \"numbing\" medication injected around the nerves to anesthetize or \"numb\" the area supplied by those nerves.  This block is injected into the muscle layer near your surgical site.  The medication may numb the location where you had surgery for 6-18 hours, but may last up to 24 hours.  If your surgical site is an arm or leg you should be careful with your affected limb, since it is possible to injure your limb without being aware of it due to the numbing.  Until full feeling returns, you should guard against bumping or hitting your limb, and avoid extreme hot or cold temperatures on the skin.  As the block wears off, the feeling will return as " a tingling or prickly sensation near your surgical site.  You will experience more discomfort from your incision as the feeling returns.  You may want to take a pain pill (a narcotic or Tylenol if this was prescribed by your surgeon) when you start to experience mild pain before the pain beccomes more severe.  If your pain medications do not control your pain you should notifiy your surgeon.    Tips for taking pain medications  To get the best pain relief possible, remember these points:    Take pain medications as directed, before pain becomes severe.    Pain medication can upset your stomach: taking it with food may help.    Constipation is a common side effect of pain medication. Drink plenty of  fluids.    Eat foods high in fiber. Take a stool softener if recommended by your doctor or pharmacist.    Do not drink alcohol, drive or operate machinery while taking pain medications.    Ask about other ways to control pain, such as with heat, ice or relaxation.    Tylenol/Acetaminophen Consumption  To help encourage the safe use of acetaminophen, the makers of TYLENOL  have lowered the maximum daily dose for single-ingredient Extra Strength TYLENOL  (acetaminophen) products sold in the [place] from 8 pills per day (4,000 mg) to 6 pills per day (3,000 mg). The dosing interval has also changed from 2 pills every 4-6 hours to 2 pills every 6 hours.    If you feel your pain relief is insufficient, you may take Tylenol/Acetaminophen in addition to your narcotic pain medication.     Be careful not to exceed 3,000 mg of Tylenol/Acetaminophen in a 24 hour period from all sources.    If you are taking extra strength Tylenol/acetaminophen (500 mg), the maximum dose is 6 tablets in 24 hours.    If you are taking regular strength acetaminophen (325 mg), the maximum dose is 9 tablets in 24 hours.    Call a doctor for any of the followin. Signs of infection (fever, growing tenderness at the surgery site, a large amount of  drainage or bleeding, severe pain, foul-smelling drainage, redness, swelling).  2. It has been over 8 to 10 hours since surgery and you are still not able to urinate (pass water).  3. Headache for over 24 hours.  4. Numbness, tingling or weakness the day after surgery (if you had spinal anesthesia).  5. Signs of Covid-19 infection (temperature over 100 degrees, shortness of breath, cough, loss of taste/smell, generalized body aches, persistent headache, chills, sore throat, nausea/vomiting/diarrhea)  Your doctor is:       Dr. Radha Guerra, Ophthalmology: [phone]               Or dial [phone] and ask for the resident on call for:  Ophthalmology  For emergency care, call the:  [facility]:  [phone] (TTY for hearing impaired: [phone])

## 2021-10-07 NOTE — ANESTHESIA PREPROCEDURE EVALUATION
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]  Eyelid retraction or lag [H02.539]    Procedure : Procedure(s):  Right lower eyeild retraction repair          Past Medical History:   Diagnosis Date     Anxiety      Borderline diabetes      GERD (gastroesophageal reflux disease)      HIV (human immunodeficiency virus infection) (H)      HTN (hypertension)      Meningioma (H)      Personal history of PE (pulmonary embolism)     , currently on xarelto      Past Surgical History:   Procedure Laterality Date     BLEPHAROPLASTY Left 2020    Procedure: left upper eyelid blepharoplasty;  Surgeon: Radha Guerra MD;  Location: [facility] OR     CRANIOTOMY, SUBOCCIPITAL N/A 2018    Procedure: CRANIOTOMY, SUBOCCIPITAL;  Suboccipital crainectomy for biopsy of Right Cerebellopontine Angle Meningioma with fat graft;  Surgeon: Azeem Galvin MD;  Location: [facility] OR     DENTAL SURGERY       IMPLANT GOLD WEIGHT EYELID Right 2020    Procedure: Right upper eyelid 0.8 gram weight;  Surgeon: Radha Guerra MD;  Location: [facility] OR     RECESSION RESECTION WITH ADJUSTABLE SUTURE Left 2019    Procedure: Left Strabismus Repair, with Adjustable Suture;  Surgeon: Thomas Salazar MD;  Location: [facility] OR     REPAIR RETRACTION LID Right 2019    Procedure: Right Lower Eyelid Retraction Repair with Thin Alloderm, Temporary Tarsorrhaphy;  Surgeon: Radha Guerra MD;  Location: [facility] OR     TARSORRHAPHY Right 2019    Procedure: Temporary Tarsorrhaphy;  Surgeon: Radha Guerra MD;  Location: [facility] OR      Allergies   Allergen Reactions     Benzalkonium Chloride      Nevirapine      PN: LW Reaction: Burns, Blisters  , Rdz- Tom Syndrome     Other [No Clinical Screening - See Comments]      Neveramune     Zolpidem  Other (See Comments) and Visual Disturbance     Abnormal behavior.  (a side effect, not a true allergy).   Abnormal behavior.  (a side effect, not a true allergy).   Other reaction(s): Hallucinations  Abnormal behavior.  (a side effect, not a true allergy).      Penicillins Rash     1984 1984      Social History     Tobacco Use     Smoking status: Current Every Day Smoker     Packs/day: 1.00     Years: 25.00     Pack years: 25.00     Types: Cigarettes     Smokeless tobacco: Never Used   Substance Use Topics     Alcohol use: No      Wt Readings from Last 1 Encounters:   10/07/21 73.5 kg (162 lb)        Anesthesia Evaluation   Pt has had prior anesthetic.     No history of anesthetic complications       ROS/MED HX  ENT/Pulmonary: Comment: 7th cranial nerve palsy on right    (+) COPD,     Neurologic:  - neg neurologic ROS     Cardiovascular:     (+) hypertension-----    METS/Exercise Tolerance: >4 METS    Hematologic:     (+) History of blood clots (PE), pt is anticoagulated,     Musculoskeletal:  - neg musculoskeletal ROS     GI/Hepatic:     (+) Asymptomatic on medication,     Renal/Genitourinary:  - neg Renal ROS     Endo:     (+) type II DM,     Psychiatric/Substance Use:     (+) psychiatric history anxiety alcohol abuse     Infectious Disease:     (+) HIV,     Malignancy:  - neg malignancy ROS     Other:  - neg other ROS          Physical Exam    Airway        Mallampati: II   TM distance: > 3 FB   Neck ROM: full   Mouth opening: > 3 cm    Respiratory Devices and Support         Dental     Comment: Cemented denture on upper and lower that cannot be removed        Cardiovascular   cardiovascular exam normal          Pulmonary   pulmonary exam normal            Other findings: Right sided facial droop    OUTSIDE LABS:  CBC:   Lab Results   Component Value Date    WBC 7.9 04/17/2018    WBC 7.8 04/16/2018    HGB 13.3 04/17/2018    HGB 13.6 04/16/2018    HCT 40.2 04/17/2018    HCT 42.1 04/16/2018     04/17/2018      04/16/2018     BMP:   Lab Results   Component Value Date     04/17/2018     04/16/2018    POTASSIUM 3.8 04/17/2018    POTASSIUM 4.0 04/16/2018    CHLORIDE 105 04/17/2018    CHLORIDE 105 04/16/2018    CO2 25 04/17/2018    CO2 25 04/16/2018    BUN 29 04/17/2018    BUN 28 04/16/2018    CR 0.76 04/17/2018    CR 0.66 04/16/2018     (H) 10/07/2021     (H) 04/17/2018     COAGS:   Lab Results   Component Value Date    PTT 29 04/02/2018    INR 0.96 04/02/2018     POC:   Lab Results   Component Value Date     (H) 11/05/2020     HEPATIC:   Lab Results   Component Value Date    ALBUMIN 3.6 03/24/2018    PROTTOTAL 7.2 03/24/2018    ALT 22 03/24/2018    AST 6 03/24/2018    ALKPHOS 78 03/24/2018    BILITOTAL 0.3 03/24/2018     OTHER:   Lab Results   Component Value Date    PH 7.31 (L) 04/12/2018    A1C 5.8 04/13/2018    BILL 9.2 04/17/2018    PHOS 2.6 04/15/2018    MAG 2.3 04/15/2018       Anesthesia Plan    ASA Status:  2      Anesthesia Type: MAC.     - Reason for MAC: immobility needed              Consents    Anesthesia Plan(s) and associated risks, benefits, and realistic alternatives discussed. Questions answered and patient/representative(s) expressed understanding.     - Discussed with:  Patient      - Extended Intubation/Ventilatory Support Discussed: No.      - Patient is DNR/DNI Status: No    Use of blood products discussed: No .     Postoperative Care    Pain management: Oral pain medications, IV analgesics, Multi-modal analgesia.   PONV prophylaxis: Ondansetron (or other 5HT-3)     Comments:         H&P reviewed: Unable to attach H&P to encounter due to EHR limitations. H&P Update: appropriate H&P reviewed, patient examined, interval changes since H&P (within 30 days) include: care everywhere         Andrae Swan III, MD

## 2021-10-07 NOTE — PROGRESS NOTES
Dr. ALDO Guerra paged related to restarting Xarelto.  Pt instructed to restart Xarelto on 10/9/2021.

## 2021-10-07 NOTE — ANESTHESIA POSTPROCEDURE EVALUATION
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    Anesthesia Type:  MAC    Note:  Disposition: Outpatient   Postop Pain Control: Uneventful            Sign Out: Well controlled pain   PONV: No   Neuro/Psych: Uneventful            Sign Out: Acceptable/Baseline neuro status   Airway/Respiratory: Uneventful            Sign Out: Acceptable/Baseline resp. status   CV/Hemodynamics: Uneventful            Sign Out: Acceptable CV status; No obvious hypovolemia; No obvious fluid overload   Other NRE: NONE   DID A NON-ROUTINE EVENT OCCUR? No           Last vitals:  Vitals Value Taken Time   BP 98/58 10/07/21 1338   Temp 36.7  C (98  F) 10/07/21 1338   Pulse 73 10/07/21 1338   Resp 20 10/07/21 1338   SpO2 95 % 10/07/21 1338       Electronically Signed By: Leonardo Yancey MD  October 7, 2021  2:18 PM

## 2021-10-07 NOTE — ANESTHESIA CARE TRANSFER NOTE
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    Anesthesia Type:   MAC     Note:    Oropharynx: oropharynx clear of all foreign objects  Level of Consciousness: awake and drowsy  Oxygen Supplementation: nasal cannula  Level of Supplemental Oxygen (L/min / FiO2): 3  Independent Airway: airway patency satisfactory and stable  Dentition: dentition unchanged  Vital Signs Stable: post-procedure vital signs reviewed and stable  Report to RN Given: handoff report given  Patient transferred to: Phase II  Comments: VSS and WNL, comfortable, no PONV, report to RN  Handoff Report: Identifed the Patient, Identified the Reponsible Provider, Reviewed the pertinent medical history, Discussed the surgical course, Reviewed Intra-OP anesthesia mangement and issues during anesthesia, Set expectations for post-procedure period and Allowed opportunity for questions and acknowledgement of understanding      Vitals:  Vitals Value Taken Time   BP     Temp     Pulse     Resp     SpO2         Electronically Signed By: TERESA Duran CRNA  October 7, 2021  1:39 PM

## 2021-10-12 ENCOUNTER — OFFICE VISIT (OUTPATIENT)
Dept: OPHTHALMOLOGY | Facility: CLINIC | Age: 61
End: 2021-10-12
Payer: COMMERCIAL

## 2021-10-12 DIAGNOSIS — H02.233 PARALYTIC LAGOPHTHALMOS OF RIGHT EYE, UNSPECIFIED EYELID: ICD-10-CM

## 2021-10-12 DIAGNOSIS — G51.0 7TH NERVE PALSY: Primary | ICD-10-CM

## 2021-10-12 PROCEDURE — 99024 POSTOP FOLLOW-UP VISIT: CPT | Performed by: OPHTHALMOLOGY

## 2021-10-12 NOTE — PROGRESS NOTES
"Chief Complaint(s) and History of Present Illness(es)     Post Op (Ophthalmology) Right Eye     Laterality: right eye    Onset: 5    Frequency: constantly    Course: gradually worsening    Treatments tried: ointment    Pain scale: 10/10              Comments     S/p Right lower eyelid retraction repair with conjunctivoplasty, Alloderm   (acellular human dermal matrix) graft, lateral canthopexy and temporary   Paris suture tarsorrhaphy on 10/7/2021. Pt states he is done with the   [facility]. Has been in pain for the last several days. Pt states   was not given enough pain meds, taking tylenol.     Alix Stephens, COT COT 7:40 AM October 12, 2021      Very upset today. He is angry at neurosurgery \"for causing all of my problems in the first place,\" and 5mg oxycodone was not enough for him.     Sutures removed. No epi defect, cornea with mild punctate epithelial erosions. Lower lid in good position, margin to reflex distance 2 of about 3 mm, over corrected as desired.     EES richard to incision three times a day, and 1/2\" strip into the eye at bedtime.  Art tears  continue warm packs  Return to clinic in 2 months. I apologized for all he has been through with regards to his care, and told him I would love to continue to care for him. He is from [place], and I did provide names of other providers who could provide similar care, but again encouraged him to follow up in 2 months, or sooner with any problems.     Attending Physician Attestation: Complete documentation of historical and exam elements from today's encounter can be found in the full encounter summary report (not reduplicated in this progress note). I personally obtained the chief complaint(s) and history of present illness. I confirmed and edited as necessary the review of systems, past medical/surgical history, family history, social history, and examination findings as documented by others; and I examined the patient myself. I personally reviewed the " relevant tests, images, and reports as documented above. I formulated and edited as necessary the assessment and plan and discussed the findings and management plan with the patient and family. I personally reviewed the ophthalmic test(s) associated with this encounter, agree with the interpretation(s) as documented by the resident/fellow, and have edited the corresponding report(s) as necessary. Radha Guerra MD

## 2021-10-12 NOTE — NURSING NOTE
Chief Complaints and History of Present Illnesses   Patient presents with     Post Op (Ophthalmology) Right Eye     Chief Complaint(s) and History of Present Illness(es)     Post Op (Ophthalmology) Right Eye     Laterality: right eye    Onset: 5    Frequency: constantly    Course: gradually worsening    Treatments tried: ointment    Pain scale: 10/10              Comments     S/p Right lower eyelid retraction repair with conjunctivoplasty, Alloderm (acellular human dermal matrix) graft, lateral canthopexy and temporary Paris suture tarsorrhaphy on 10/7/2021. Pt states he is done with the [facility]. Has been in pain for the last several days. Pt states was not given enough pain meds, taking tylenol.     JAMIE Soriano COT 7:40 AM October 12, 2021

## 2021-10-13 ENCOUNTER — TELEPHONE (OUTPATIENT)
Dept: OPHTHALMOLOGY | Facility: CLINIC | Age: 61
End: 2021-10-13

## 2021-10-13 NOTE — TELEPHONE ENCOUNTER
Spoke with patient regarding scheduling a Return in about 2 months (around 12/12/2021) for Post Op. Patient declined scheduling and was yelling profanity.-Per Patient

## 2021-10-30 ENCOUNTER — OFFICE VISIT (OUTPATIENT)
Dept: OPHTHALMOLOGY | Facility: CLINIC | Age: 61
End: 2021-10-30
Payer: COMMERCIAL

## 2021-10-30 DIAGNOSIS — H02.233 PARALYTIC LAGOPHTHALMOS OF RIGHT EYE, UNSPECIFIED EYELID: ICD-10-CM

## 2021-10-30 DIAGNOSIS — G51.0 7TH NERVE PALSY: Primary | ICD-10-CM

## 2021-10-30 DIAGNOSIS — H02.539 EYELID RETRACTION OR LAG: ICD-10-CM

## 2021-10-30 PROCEDURE — 99207 PR NO BILLABLE SERVICE THIS VISIT: CPT | Performed by: STUDENT IN AN ORGANIZED HEALTH CARE EDUCATION/TRAINING PROGRAM

## 2021-10-30 RX ORDER — ERYTHROMYCIN 5 MG/G
0.5 OINTMENT OPHTHALMIC 3 TIMES DAILY
Qty: 3.5 G | Refills: 3 | Status: SHIPPED | OUTPATIENT
Start: 2021-10-30 | End: 2023-05-18

## 2021-10-30 ASSESSMENT — VISUAL ACUITY
METHOD: SNELLEN - LINEAR
OD_SC: 20/25
OS_SC: 20/20
OD_SC+: -1

## 2021-10-30 ASSESSMENT — SLIT LAMP EXAM - LIDS: COMMENTS: NORMAL

## 2021-10-30 ASSESSMENT — CONF VISUAL FIELD
OS_NORMAL: 1
OD_NORMAL: 1

## 2021-10-30 ASSESSMENT — TONOMETRY
IOP_METHOD: TONOPEN
OS_IOP_MMHG: 10
OD_IOP_MMHG: 11

## 2021-10-30 ASSESSMENT — EXTERNAL EXAM - LEFT EYE: OS_EXAM: NORMAL

## 2021-10-30 NOTE — PROGRESS NOTES
OPHTHALMOLOGY CONSULT NOTE  10/30/21    Patient: Charles Santos      ASSESSMENT/PLAN:     Charles Santos is a 61 year old male who presented with:    CN 7 Palsy  Paralytic lagophthalmos, right eye   Eyelid retraction right eye s/p retraction repair  (most recently 10/7/21)   - 6/2019 Right lower retraction repair with conjunctivoplasty, alloderm graft, and lateral canthopexy  - 11/2019 Left medial rectus recession   - 11/2020 Right upper eyelid 0.8g chain placement and left upper blepharoplasty  - s/p right lower eyelid retraction repair 10/7/21  - Patient has history of 6th and 7th nerve palsy s/p resection of meningioma as well as facial synkinesis of the right eye for which he receives botox injections with Dr. Villeda (11/10/21)    Plan:   - Increase erythromycin richard to TID  - Continue artificial tears  - warm compresses BID   - follow-up in 2 weeks with Dr. Reyez    It is our pleasure to participate in this patient's care and treatment. Please contact us with any further questions or concerns.    Discussed with Dr. Chelle Rand who agreed with this assessment and plan.     Madelyn Gonzalez MD     HISTORY OF PRESENTING ILLNESS:     Charles Santos is a 61 year old male with POH of right lagophthalmos s/p gold weight, 7th nerve palsy who presented on 10/30/21 with increased redness and discharge.    4-5 days ago started to notice pus coming from the right eye. He sleeps on the right side and has been noticing some drainage from the eye. Now he endorses some burning in the corner of the right eye. Concerned he might have a stitch in the right eye. Currently only using the artificial tears and erythromycin richard at night.     No double vision blurry vision, flashes, floaters, fevers, chills, not hot to touch.     10+ review of systems were otherwise negative except for that which has been stated above.      OCULAR/MEDICAL/SURGICAL HISTORIES:     Past Ocular History:   Refractive error: none  Prior eye  surgery/laser: yes  CTL wearer: no  GTTs: no    Past Medical History:  As below    Past Medical History:   Diagnosis Date     Anxiety      Borderline diabetes      GERD (gastroesophageal reflux disease)      HIV (human immunodeficiency virus infection) (H) 1987     HTN (hypertension)      Meningioma (H)      Personal history of PE (pulmonary embolism)     2005, currently on xarelto       Past Surgical History:   Procedure Laterality Date     BLEPHAROPLASTY Left 11/5/2020    Procedure: left upper eyelid blepharoplasty;  Surgeon: Radha Guerra MD;  [place] [facility]     CRANIOTOMY, SUBOCCIPITAL N/A 4/12/2018    Procedure: CRANIOTOMY, SUBOCCIPITAL;  Suboccipital crainectomy for biopsy of Right Cerebellopontine Angle Meningioma with fat graft;  Surgeon: Azeem Galvin MD;  Location: [facility] OR     DENTAL SURGERY  1990     IMPLANT GOLD WEIGHT EYELID Right 11/5/2020    Procedure: Right upper eyelid 0.8 gram weight;  Surgeon: Radha Guerra MD;  [place] [facility]     RECESSION RESECTION WITH ADJUSTABLE SUTURE Left 11/4/2019    Procedure: Left Strabismus Repair, with Adjustable Suture;  Surgeon: Thomas Salazar MD;  Location:  OR     REPAIR RETRACTION LID Right 6/13/2019    Procedure: Right Lower Eyelid Retraction Repair with Thin Alloderm, Temporary Tarsorrhaphy;  Surgeon: Radha Guerra MD;  Location: [facility] OR     REPAIR RETRACTION LID Right 10/7/2021    Procedure: Right lower eyeild retraction repair;  Surgeon: Radha Guerra MD;  [place] [facility]     TARSORRHAPHY Right 6/13/2019    Procedure: Temporary Tarsorrhaphy;  Surgeon: Radha Guerra MD;  Location: [facility] OR       Family History:  Non contributory    Social History:  Non contributory    EXAMINATION:     Base Eye Exam     Visual Acuity (Snellen - Linear)       Right Left    Dist sc 20/25 -1 20/20          Tonometry (Tonopen, 10:44 AM)       Right Left    Pressure 11 10          Pupils       Pupils    Right PERRL    Left PERRL           Visual Fields       Left Right     Full Full          Extraocular Movement       Right Left     Full, Nystagmus Full, Nystagmus          Neuro/Psych     Oriented x3: Yes    Mood/Affect: Normal            Strabismus Exam     R Tilt L Tilt                 Nystagmus: Yes       Slit Lamp and Fundus Exam     External Exam       Right Left    External CN 7 palsy mild midface hypertonicity. Good brow position, but decreased function.  Normal          Slit Lamp Exam       Right Left    Lids/Lashes Tr lagophthalmos, s/p right eyelid retraction repair, some mucoid discharge Normal    Conjunctiva/Sclera White and quiet White and quiet    Cornea Clear, PEEs some PEEs    Anterior Chamber Deep and quiet Deep and quiet    Iris Round and reactive Round and reactive    Lens 1-2+ NS 1-2+ NS                Labs/Studies/Imaging Performed  none     Madelyn Gonzalez MD  Resident Physician, PGY-2  Department of Ophthalmology  10/30/21 9:56 AM

## 2021-11-10 ENCOUNTER — TELEPHONE (OUTPATIENT)
Dept: OPHTHALMOLOGY | Facility: CLINIC | Age: 61
End: 2021-11-10

## 2021-11-10 ENCOUNTER — ALLIED HEALTH/NURSE VISIT (OUTPATIENT)
Dept: OPHTHALMOLOGY | Facility: CLINIC | Age: 61
End: 2021-11-10
Payer: COMMERCIAL

## 2021-11-10 DIAGNOSIS — G51.8 OTHER DISORDERS OF FACIAL NERVE: Primary | ICD-10-CM

## 2021-11-10 PROCEDURE — 64612 DESTROY NERVE FACE MUSCLE: CPT | Mod: RT | Performed by: OPHTHALMOLOGY

## 2021-11-10 NOTE — TELEPHONE ENCOUNTER
Spoke with patient regarding scheduling 12 weeks for Botox Injection Return. Scheduled accordingly and sent appointment letter to confirmed address.-Per Patient

## 2021-12-02 ENCOUNTER — TELEPHONE (OUTPATIENT)
Dept: OPHTHALMOLOGY | Facility: CLINIC | Age: 61
End: 2021-12-02
Payer: COMMERCIAL

## 2021-12-02 NOTE — TELEPHONE ENCOUNTER
Spoke with patient regarding scheduling a Post-Op for 2 month Return with . Scheduled patient accordingly and patient declined need of an appointment letter. Patient is aware of date, time and location.-Per Patient

## 2021-12-02 NOTE — TELEPHONE ENCOUNTER
Patient called and was transferred to patient to schedule an appointment with Dr. Guerra in clinic.   Patient states he is unsure how he got transferred when speaking to the call center.     Routed to the clinic coordinators for scheduling an appt with Dr. Guerra in clinic.    Genna Santos on 12/2/2021 at 11:18 AM

## 2021-12-07 ENCOUNTER — OFFICE VISIT (OUTPATIENT)
Dept: OPHTHALMOLOGY | Facility: CLINIC | Age: 61
End: 2021-12-07
Payer: COMMERCIAL

## 2021-12-07 DIAGNOSIS — H02.539 EYELID RETRACTION OR LAG: Primary | ICD-10-CM

## 2021-12-07 DIAGNOSIS — G51.0 7TH NERVE PALSY: ICD-10-CM

## 2021-12-07 DIAGNOSIS — H02.233 PARALYTIC LAGOPHTHALMOS OF RIGHT EYE, UNSPECIFIED EYELID: ICD-10-CM

## 2021-12-07 PROCEDURE — 99024 POSTOP FOLLOW-UP VISIT: CPT | Performed by: OPHTHALMOLOGY

## 2021-12-07 ASSESSMENT — VISUAL ACUITY
OD_CC: 20/25
OS_CC: 20/20
METHOD: SNELLEN - LINEAR
CORRECTION_TYPE: GLASSES

## 2021-12-07 ASSESSMENT — TONOMETRY
IOP_METHOD: ICARE
OD_IOP_MMHG: 10
OS_IOP_MMHG: 10

## 2021-12-07 NOTE — NURSING NOTE
Chief Complaints and History of Present Illnesses   Patient presents with     Post Op (Ophthalmology) Right Eye     Chief Complaint(s) and History of Present Illness(es)     Post Op (Ophthalmology) Right Eye     Laterality: right eye    Onset: months ago    Frequency: constantly    Course: stable    Associated symptoms: discharge    Treatments tried: artificial tears    Pain scale: 0/10              Comments     S/p Right lower eyelid retraction repair with conjunctivoplasty, Alloderm (acellular human dermal matrix) graft, lateral canthopexy and temporary Paris suture tarsorrhaphy on 10/7/2021. Pt states right lower lid is still swollen, hurt when he had the botox injection the right lower lid. Done with hot packs one week ago. AT PRN . Pt continuing to have discharge in the right eye.     Alix Stephens, JAMIE COT 9:32 AM December 7, 2021

## 2021-12-07 NOTE — PROGRESS NOTES
Chief Complaint(s) and History of Present Illness(es)     Post Op (Ophthalmology) Right Eye     Laterality: right eye    Onset: months ago    Frequency: constantly    Course: stable    Associated symptoms: discharge    Treatments tried: artificial tears    Pain scale: 0/10              Comments     S/p Right lower eyelid retraction repair with conjunctivoplasty, Alloderm   (acellular human dermal matrix) graft, lateral canthopexy and temporary   Frost suture tarsorrhaphy on 10/7/2021. Pt states right lower lid is still   swollen, hurt when he had the botox injection the right lower lid. Done   with hot packs one week ago. AT PRN . Pt continuing to have discharge in   the right eye.     Alix Stephens, COT COT 9:32 AM December 7, 2021         Charles Santos is about 2 months status post op right lower eyelid paralytic ectropion/retraction repair with acellular dermis. Lower lid in much improved position and good tone. margin to reflex distance 2 right eye is about 3 mm, left is 5 mm.     The graft is a bit thickened, but does not bother him. Did discuss option of intralesional steroid injection, versus observation. He would like to observe.    Recommended using warm compresses, as needed artificial tears. Continue to see Dr. Villeda for botulinum toxin injections, f/u with me with recurrence of lower eyelid retraction or ectropion. We did discuss this is very common with paralytic lagopthalmos.       Complete documentation of historical and exam elements from today's encounter can be found in the full encounter summary report (not reduplicated in this progress note). I personally obtained the chief complaint(s) and history of present illness.  I confirmed and edited as necessary the review of systems, past medical/surgical history, family history, social history, and examination findings as documented by others; and I examined the patient myself. I personally reviewed the relevant tests, images, and reports as documented  above. I formulated and edited as necessary the assessment and plan and discussed the findings and management plan with the patient and family. - Radha Guerra MD

## 2022-01-23 ENCOUNTER — HEALTH MAINTENANCE LETTER (OUTPATIENT)
Age: 62
End: 2022-01-23

## 2022-02-02 ENCOUNTER — ALLIED HEALTH/NURSE VISIT (OUTPATIENT)
Dept: OPHTHALMOLOGY | Facility: CLINIC | Age: 62
End: 2022-02-02
Payer: COMMERCIAL

## 2022-02-02 DIAGNOSIS — G51.8 OTHER DISORDERS OF FACIAL NERVE: ICD-10-CM

## 2022-02-02 DIAGNOSIS — R25.8 OTHER ABNORMAL INVOLUNTARY MOVEMENTS: Primary | ICD-10-CM

## 2022-02-02 DIAGNOSIS — D32.9 MENINGIOMA (H): ICD-10-CM

## 2022-02-02 PROCEDURE — 64612 DESTROY NERVE FACE MUSCLE: CPT | Mod: 50 | Performed by: OPHTHALMOLOGY

## 2022-02-02 NOTE — PROGRESS NOTES
Assessment & Plan     Charles Santos is a 61 year old male with the following diagnoses:   1. Other abnormal involuntary movements    2. Other disorders of facial nerve    3. Meningioma (H)         Botulinum toxin given without complication.  Patient to return in 3 months, sooner as needed.     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  He developed synkinesis for which he receives botox.  He has recently noted worsening right mouth droop.  He has not had an MRI since the surgery in 2018.  Will repeat MRI.           Attending Physician Attestation:  I have seen and examined this patient.  I have confirmed and edited as necessary the chief complaint(s), history of present illness, review of systems, relevant history, and examination findings as documented by others.  I have personally reviewed the relevant tests, images, and reports as documented above.  I have confirmed and edited as necessary the assessment and plan and agree with this note.  - Jonathan Villeda MD 8:56 AM 2/2/2022     [Dear  ___] : Dear  [unfilled], [Courtesy Letter:] : I had the pleasure of seeing your patient, [unfilled], in my office today. [Please see my note below.] : Please see my note below. [Consult Closing:] : Thank you very much for allowing me to participate in the care of this patient.  If you have any questions, please do not hesitate to contact me. [Sincerely,] : Sincerely, [FreeTextEntry3] : Jack Del Rio, DO

## 2022-02-08 ENCOUNTER — TELEPHONE (OUTPATIENT)
Dept: OPHTHALMOLOGY | Facility: CLINIC | Age: 62
End: 2022-02-08
Payer: COMMERCIAL

## 2022-02-08 NOTE — TELEPHONE ENCOUNTER
Spoke with patient regarding scheduling a Return in 3 months (on 5/2/2022). Scheduled patient accordingly and sent appointment letter to updated and confirmed address. -Per Patient

## 2022-03-20 ENCOUNTER — HEALTH MAINTENANCE LETTER (OUTPATIENT)
Age: 62
End: 2022-03-20

## 2022-04-27 ENCOUNTER — ALLIED HEALTH/NURSE VISIT (OUTPATIENT)
Dept: OPHTHALMOLOGY | Facility: CLINIC | Age: 62
End: 2022-04-27
Payer: COMMERCIAL

## 2022-04-27 ENCOUNTER — TELEPHONE (OUTPATIENT)
Dept: OPHTHALMOLOGY | Facility: CLINIC | Age: 62
End: 2022-04-27

## 2022-04-27 DIAGNOSIS — G51.8 OTHER DISORDERS OF FACIAL NERVE: Primary | ICD-10-CM

## 2022-04-27 DIAGNOSIS — G24.5 BLEPHAROSPASM: ICD-10-CM

## 2022-04-27 PROCEDURE — 64612 DESTROY NERVE FACE MUSCLE: CPT | Mod: RT | Performed by: OPHTHALMOLOGY

## 2022-04-27 NOTE — PROGRESS NOTES
Assessment & Plan     Charles Santos is a 62 year old male with the following diagnoses:   1. Other disorders of facial nerve    2. Blepharospasm         Botulinum toxin given without complication.  Patient to return in 3 months, sooner as needed.          Attending Physician Attestation:  I have seen and examined this patient.  I have confirmed and edited as necessary the chief complaint(s), history of present illness, review of systems, relevant history, and examination findings as documented by others.  I have personally reviewed the relevant tests, images, and reports as documented above.  I have confirmed and edited as necessary the assessment and plan and agree with this note.  - Jonathan Villeda MD 9:14 AM 4/27/2022

## 2022-04-27 NOTE — TELEPHONE ENCOUNTER
Spoke to patient regarding scheduling for Return in 3 months (on 7/27/2022) botox injection. Appointment is scheduled accordingly, patient will see information on MyChart.

## 2022-08-15 ENCOUNTER — TELEPHONE (OUTPATIENT)
Dept: OPHTHALMOLOGY | Facility: CLINIC | Age: 62
End: 2022-08-15

## 2022-08-15 NOTE — TELEPHONE ENCOUNTER
I spoke to the patient who states he talked to his [employer]  and [employer] will cover his Botox injection.  He is scheduled for next week.

## 2022-08-18 NOTE — TELEPHONE ENCOUNTER
Left voicemail for patient as our finance team says it is not covered.  If we have a name of someone we can talk to at [employer], we can try talking directly to them. Asked him to call me to discuss.      Isidra Blevins on 8/18/2022 at 3:16 PM

## 2022-08-23 ENCOUNTER — MYC MEDICAL ADVICE (OUTPATIENT)
Dept: OPHTHALMOLOGY | Facility: CLINIC | Age: 62
End: 2022-08-23

## 2022-08-23 NOTE — TELEPHONE ENCOUNTER
Spoke to patient.  He will send [employer] message with contact information.  Notified him of cancelling his appointment for tomorrow.     Isidra Blevins on 8/23/2022 at 11:55 AM

## 2022-08-26 ENCOUNTER — TELEPHONE (OUTPATIENT)
Dept: OPHTHALMOLOGY | Facility: CLINIC | Age: 62
End: 2022-08-26

## 2022-08-26 NOTE — TELEPHONE ENCOUNTER
Left voicemail for Katherin Bruner  [phone] about getting Botox covered for patient as patient is underr the understanding it would be covered.  Asked her to give me a call to discuss.     Isidra Blevins on 8/26/2022 at 3:04 PM

## 2022-09-11 ENCOUNTER — HEALTH MAINTENANCE LETTER (OUTPATIENT)
Age: 62
End: 2022-09-11

## 2023-01-22 ENCOUNTER — HEALTH MAINTENANCE LETTER (OUTPATIENT)
Age: 63
End: 2023-01-22

## 2023-04-26 ENCOUNTER — OFFICE VISIT (OUTPATIENT)
Dept: OPHTHALMOLOGY | Facility: CLINIC | Age: 63
End: 2023-04-26
Payer: COMMERCIAL

## 2023-04-26 DIAGNOSIS — H35.89 OTHER SPECIFIED RETINAL DISORDERS: ICD-10-CM

## 2023-04-26 DIAGNOSIS — E11.9 TYPE 2 DIABETES MELLITUS WITHOUT COMPLICATION, WITHOUT LONG-TERM CURRENT USE OF INSULIN (H): Primary | ICD-10-CM

## 2023-04-26 DIAGNOSIS — R73.03 BORDERLINE DIABETES: ICD-10-CM

## 2023-04-26 DIAGNOSIS — H02.233 PARALYTIC LAGOPHTHALMOS OF RIGHT EYE, UNSPECIFIED EYELID: ICD-10-CM

## 2023-04-26 DIAGNOSIS — E11.9 TYPE 2 DIABETES MELLITUS WITHOUT COMPLICATION, WITHOUT LONG-TERM CURRENT USE OF INSULIN (H): ICD-10-CM

## 2023-04-26 DIAGNOSIS — D32.9 MENINGIOMA (H): ICD-10-CM

## 2023-04-26 DIAGNOSIS — H49.20 6TH NERVE PALSY, UNSPECIFIED LATERALITY: Primary | ICD-10-CM

## 2023-04-26 PROCEDURE — 92134 CPTRZ OPH DX IMG PST SGM RTA: CPT | Performed by: OPHTHALMOLOGY

## 2023-04-26 PROCEDURE — V2718 FRESNELL PRISM PRESS-ON LENS: HCPCS | Performed by: OPHTHALMOLOGY

## 2023-04-26 PROCEDURE — 99214 OFFICE O/P EST MOD 30 MIN: CPT | Performed by: OPHTHALMOLOGY

## 2023-04-26 PROCEDURE — 92060 SENSORIMOTOR EXAMINATION: CPT | Performed by: OPHTHALMOLOGY

## 2023-04-26 ASSESSMENT — CONF VISUAL FIELD
OS_SUPERIOR_TEMPORAL_RESTRICTION: 0
OS_INFERIOR_NASAL_RESTRICTION: 0
OS_INFERIOR_TEMPORAL_RESTRICTION: 0
OD_INFERIOR_NASAL_RESTRICTION: 0
OD_NORMAL: 1
OD_SUPERIOR_NASAL_RESTRICTION: 0
OD_INFERIOR_TEMPORAL_RESTRICTION: 0
OS_SUPERIOR_NASAL_RESTRICTION: 0
OS_NORMAL: 1
METHOD: COUNTING FINGERS
OD_SUPERIOR_TEMPORAL_RESTRICTION: 0

## 2023-04-26 ASSESSMENT — VISUAL ACUITY
METHOD: SNELLEN - LINEAR
OD_CC: 20/25
CORRECTION_TYPE: GLASSES
OS_CC: 20/20

## 2023-04-26 ASSESSMENT — TONOMETRY
OS_IOP_MMHG: 12
OD_IOP_MMHG: 11
IOP_METHOD: ICARE

## 2023-04-26 ASSESSMENT — REFRACTION_MANIFEST
OD_ADD: +2.50
OD_SPHERE: +1.75
OS_SPHERE: +1.50
OS_AXIS: 007
OS_CYLINDER: +0.50
OD_CYLINDER: SPHERE
OS_ADD: +2.50

## 2023-04-26 ASSESSMENT — SLIT LAMP EXAM - LIDS: COMMENTS: NORMAL

## 2023-04-26 ASSESSMENT — CUP TO DISC RATIO
OD_RATIO: 0.2
OS_RATIO: 0.2

## 2023-04-26 ASSESSMENT — LAGOPHTHALMOS
OD_LAGOPHTHALMOS: 0
OS_LAGOPHTHALMOS: 0

## 2023-04-26 NOTE — PROGRESS NOTES
Charles Santos is a 63 year old male with the following diagnoses:   1. 6th nerve palsy, unspecified laterality - Right Eye    2. Type 2 diabetes mellitus without complication, without long-term current use of insulin (H)    3. Paralytic lagophthalmos of right eye, unspecified eyelid    4. Meningioma (H)    5. Borderline diabetes         Follow up meningioma right pontomedullary junction status-post resection April 2018 with resultant right 6th nerve palsy, right 7th nerve palsy status-post right lower lid retraction repair, lateral canthopexy.  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 Last visit for an eye exam  Was 8/7/19. At that time, he had double vision in right gaze.  He had no ocular misalignment in his prism glasses and significant dryness of the RIGHT eye he was wearing 12 base out in his glasses.  He underwent strabismus surgery on 11/4/19.  Last visit with Thomas Salazar MD was March 2020 and was given 2 base out prism.      Today, his main complaint is recurrence of horizontal binocular diplopia for a few moments to minutes while driving. He is also noticing fluctuating blurriness, mostly in the right eye. These changes began gradually over the past month or so. No pain in the eyes or other vision changes.    Corrected distance visual acuity was 20/25 in the right eye and 20/20 in the left eye. Intraocular pressure was 11 in the right eye and 12 in the left eye using ICare.  Color vision 1/11 right eye and 1/11 left eye.  Pupils isocoric.  Anterior segment exam with mild NS.  Fundus exam unremarkable. Strabismus exam with with moderate LET, single vision with 15 DAYNE OS.    It is my impression that patient has more prominent esodeviation than following strabismus surgery in 2019 suggesting long-term regression of effect vs. Worsening of his meningioma. He sees comfortably with additional prism in clinic and we will  pursue [facility] trial. Will repeat MRI since last one was in 2019.  He has not undergone radiation therapy.      He has borderline diabetes.  There is no evidence of diabetic retinopathy.  His OCT of the macula is normal.         Attending Physician Attestation:  Complete documentation of historical and exam elements from today's encounter can be found in the full encounter summary report (not reduplicated in this progress note).  I personally obtained the chief complaint(s) and history of present illness.  I confirmed and edited as necessary the review of systems, past medical/surgical history, family history, social history, and examination findings as documented by others; and I examined the patient myself.  I personally reviewed the relevant tests, images, and reports as documented above.  I formulated and edited as necessary the assessment and plan and discussed the findings and management plan with the patient and family. - Jonathan Villeda MD

## 2023-04-26 NOTE — NURSING NOTE
"Chief Complaints and History of Present Illnesses   Patient presents with     Follow Up     Chief Complaint(s) and History of Present Illness(es)     Follow Up            Laterality: both eyes          Comments    Patient states new blurred and double vision. Patient does not currently use prism glasses. Patient states no pain and discomfort each eye. \"Tight feeling\" right eye all the time. Patient denies flashes of light and new floaters each eye. Patient did not update glasses from 3/2/23 visit with Dr. Yesenia Alvarez.  RUBY Andino April 26, 2023 7:30 AM                 "

## 2023-05-02 ENCOUNTER — HOSPITAL ENCOUNTER (OUTPATIENT)
Dept: MRI IMAGING | Facility: CLINIC | Age: 63
Discharge: HOME | End: 2023-05-02
Attending: OPHTHALMOLOGY | Admitting: OPHTHALMOLOGY
Payer: COMMERCIAL

## 2023-05-02 DIAGNOSIS — D32.9 MENINGIOMA (H): ICD-10-CM

## 2023-05-02 DIAGNOSIS — H49.20 6TH NERVE PALSY, UNSPECIFIED LATERALITY: ICD-10-CM

## 2023-05-02 PROCEDURE — A9585 GADOBUTROL INJECTION: HCPCS | Performed by: OPHTHALMOLOGY

## 2023-05-02 PROCEDURE — 255N000002 HC RX 255 OP 636: Performed by: OPHTHALMOLOGY

## 2023-05-02 PROCEDURE — 70553 MRI BRAIN STEM W/O & W/DYE: CPT

## 2023-05-02 RX ORDER — GADOBUTROL 604.72 MG/ML
7.5 INJECTION INTRAVENOUS ONCE
Status: COMPLETED | OUTPATIENT
Start: 2023-05-02 | End: 2023-05-02

## 2023-05-02 RX ADMIN — GADOBUTROL 7.5 ML: 604.72 INJECTION INTRAVENOUS at 07:22

## 2023-05-03 ENCOUNTER — TELEPHONE (OUTPATIENT)
Dept: OPHTHALMOLOGY | Facility: CLINIC | Age: 63
End: 2023-05-03
Payer: COMMERCIAL

## 2023-05-03 DIAGNOSIS — D32.9 MENINGIOMA (H): ICD-10-CM

## 2023-05-03 DIAGNOSIS — H49.20 6TH NERVE PALSY, UNSPECIFIED LATERALITY: Primary | ICD-10-CM

## 2023-05-03 NOTE — TELEPHONE ENCOUNTER
Spoke to patient regarding MRI results showing evidence of meningioma increased in size compared to previous and is in an area to cause his double vision and facial weakness.  Patient could consider further surgery or radiation therapy.  He is not interested in surgery.  He would like to think about radiation.  He is willing to have consult with them to discuss.      Isidra Blevins on 5/3/2023 at 5:04 PM

## 2023-05-04 ENCOUNTER — PATIENT OUTREACH (OUTPATIENT)
Dept: ONCOLOGY | Facility: CLINIC | Age: 63
End: 2023-05-04
Payer: COMMERCIAL

## 2023-05-04 NOTE — PROGRESS NOTES
"New Patient Radiation Oncology Nurse Navigator Note     Referral Date: 5/4/23    Referring provider:   Jonathan Villeda MD   [address] [facility] [address]   Phone: [phone]   Fax: [phone]       Referring Clinic/Organization: [facility]     Referred to: Radiation Oncology    Requested provider (if applicable): Dr. Samaniego    Evaluation for : Meningioma. \"Kamilahavery Samaniego  - sixth nerve palsy/seventh nerve palsy, meningioma with increase in size\"     Clinical History (per Nurse review of records provided):       Telephone Note from referring clinic:  \"Spoke to patient regarding MRI results showing evidence of meningioma increased in size compared to previous and is in an area to cause his double vision and facial weakness.  Patient could consider further surgery or radiation therapy.  He is not interested in surgery.  He would like to think about radiation.  He is willing to have consult with them to discuss. \"-- BOOKMARKED    Office Visit with referring MD -- BOOKMARKED    5/2/23 Brain MRI:  IMPRESSION:  1.  Meningioma along the right cerebellopontine angle measuring 1.4 x 1.7 x 1.7 cm (AP x TV x CC), previously 1.1 x 1.5 x 1.3 cm (AP x TV x CC), slightly increased. Mass effect is demonstrated along the right donte.  2.  No other evidence of acute intracranial hemorrhage, mass effect, or infarction.  3.  Moderate nonspecific white matter changes.  4.  Moderate brain parenchymal volume loss.  -- BOOKMARKED    Clinical Assessment / Barriers to Care (Per Nurse):  Pt lives in [facility] Location: [facility]     Records Needed:   N/A    Additional testing needed prior to consult:   N/A    Referral updates and Plan:   OUTGOING CALL to pt:  [facility] and that we have recd the referral for dx of Meningioma from Dr Villeda.  Pt confirms they are aware of the referral and ready to schedule. He is willing to go to [facility] location to see " Dr. Samaniego.   Provided contact information if future questions arise.     -Transferred pt to NPS line [phone] to schedule appt per scheduling instructions.    Myrna Baker, LALON, RN, PHN, OCN  Hematology/Oncology Nurse Navigator  [facility]  [phone]

## 2023-05-16 NOTE — TELEPHONE ENCOUNTER
MEDICAL RECORDS REQUEST   Radiation Oncology  [address] [place] [address]  Fax: [phone]          FUTURE VISIT INFORMATION                                                   Charles Santos, : 1960 scheduled for future visit at [facility] Radiation Oncology    RECORDS REQUESTED FOR VISIT                                                     BRAIN STATUS DETAILS   OFFICE NOTE from PCP CE- 23: Dr. Arlet Rider   OFFICE NOTE from neuro/neuro surg Epic 04/10/19: Dr. Azeem Galvin  16: Dr. Johnathon Kim   ED/HOSP ADMISSION NOTES [facility] 18: [facility]    18: [facility] ED   OPERATIVE/BIOPSY REPORTS [facility] 18: Suboccipital crainectomy for biopsy of Right Cerebellopontine Angle Meningioma with fat graft   MEDICATION LIST [facility]    LABS     PATHOLOGY REPORTS Report in EPIC 18: [id number]   ANYTHING RELATED TO DIAGNOSIS CE- Most recent 23   IMAGING (NEED IMAGES & REPORT)     MRI PACS 23-16: MR Brain  02/26/15: MRA Head Neck   PREVIOUS RADIATION  (consult only)   WHAT HEALTHCARE FACILITY CE-[facility]   RADIATION ONCOLOGIST NOTES - 18: Dr. Jonathan Cosby

## 2023-05-18 ENCOUNTER — OFFICE VISIT (OUTPATIENT)
Dept: RADIATION ONCOLOGY | Facility: CLINIC | Age: 63
End: 2023-05-18
Attending: OPHTHALMOLOGY
Payer: COMMERCIAL

## 2023-05-18 ENCOUNTER — PRE VISIT (OUTPATIENT)
Dept: RADIATION ONCOLOGY | Facility: CLINIC | Age: 63
End: 2023-05-18
Payer: COMMERCIAL

## 2023-05-18 VITALS
SYSTOLIC BLOOD PRESSURE: 117 MMHG | WEIGHT: 164.4 LBS | RESPIRATION RATE: 16 BRPM | HEART RATE: 65 BPM | BODY MASS INDEX: 24.28 KG/M2 | OXYGEN SATURATION: 92 % | TEMPERATURE: 97.6 F | DIASTOLIC BLOOD PRESSURE: 60 MMHG

## 2023-05-18 DIAGNOSIS — D32.9 MENINGIOMA (H): ICD-10-CM

## 2023-05-18 PROCEDURE — G0463 HOSPITAL OUTPT CLINIC VISIT: HCPCS | Performed by: RADIOLOGY

## 2023-05-18 PROCEDURE — 77470 SPECIAL RADIATION TREATMENT: CPT | Performed by: RADIOLOGY

## 2023-05-18 PROCEDURE — 99204 OFFICE O/P NEW MOD 45 MIN: CPT | Mod: 25 | Performed by: RADIOLOGY

## 2023-05-18 PROCEDURE — 77263 THER RADIOLOGY TX PLNG CPLX: CPT | Performed by: RADIOLOGY

## 2023-05-18 PROCEDURE — 77470 SPECIAL RADIATION TREATMENT: CPT | Mod: 26 | Performed by: RADIOLOGY

## 2023-05-18 RX ORDER — LORAZEPAM 1 MG/1
CAPSULE, EXTENDED RELEASE ORAL
COMMUNITY
Start: 2023-05-12

## 2023-05-18 RX ORDER — OXYBUTYNIN CHLORIDE 5 MG/1
1 TABLET ORAL AT BEDTIME
COMMUNITY
Start: 2023-02-01

## 2023-05-18 RX ORDER — DARIDOREXANT 25 MG/1
TABLET, FILM COATED ORAL
COMMUNITY
Start: 2023-05-12 | End: 2024-07-26

## 2023-05-18 RX ORDER — ARIPIPRAZOLE 2 MG/1
1 TABLET ORAL DAILY
COMMUNITY
Start: 2022-05-12 | End: 2024-07-26

## 2023-05-18 RX ORDER — VITAMIN B COMPLEX
2 TABLET ORAL DAILY
COMMUNITY
Start: 2021-09-15 | End: 2024-07-26

## 2023-05-18 RX ORDER — OLOPATADINE HYDROCHLORIDE 2 MG/ML
SOLUTION/ DROPS OPHTHALMIC
COMMUNITY
Start: 2023-01-06

## 2023-05-18 RX ORDER — DULOXETIN HYDROCHLORIDE 30 MG/1
30 CAPSULE, DELAYED RELEASE ORAL 2 TIMES DAILY
COMMUNITY
Start: 2023-05-03 | End: 2024-07-26

## 2023-05-18 RX ORDER — GLIPIZIDE 5 MG/1
5 TABLET, FILM COATED, EXTENDED RELEASE ORAL DAILY
COMMUNITY
Start: 2023-04-04

## 2023-05-18 RX ORDER — HYDROXYZINE HYDROCHLORIDE 10 MG/1
TABLET, FILM COATED ORAL
Status: ON HOLD | COMMUNITY
Start: 2023-03-07 | End: 2024-07-29

## 2023-05-18 RX ORDER — FLUTICASONE PROPIONATE 110 UG/1
AEROSOL, METERED RESPIRATORY (INHALATION)
COMMUNITY
Start: 2022-12-12 | End: 2024-07-26

## 2023-05-18 RX ORDER — TIOTROPIUM BROMIDE INHALATION SPRAY 3.12 UG/1
2 SPRAY, METERED RESPIRATORY (INHALATION) DAILY
COMMUNITY
Start: 2022-12-12 | End: 2024-07-26

## 2023-05-18 RX ORDER — EMTRICITABINE AND TENOFOVIR ALAFENAMIDE 200; 25 MG/1; MG/1
1 TABLET ORAL
COMMUNITY
Start: 2023-05-03

## 2023-05-18 RX ORDER — POLYVINYL ALCOHOL 14 MG/ML
SOLUTION/ DROPS OPHTHALMIC
COMMUNITY
Start: 2021-07-21 | End: 2023-05-18
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
## 2023-05-18 NOTE — PROGRESS NOTES
"No pacemaker  No prior radiation therapy treatments    Oncology Rooming Note    May 18, 2023 9:34 AM   Charles Santos is a 63 year old male who presents for:    Chief Complaint   Patient presents with     Oncology Clinic Visit     Consult with Dr. Littlejohn     Initial Vitals: /60 (BP Location: Right arm, Patient Position: Sitting, Cuff Size: Adult Regular)   Pulse 65   Temp 97.6  F (36.4  C) (Oral)   Resp 16   Wt 74.6 kg (164 lb 6.4 oz)   SpO2 92%   BMI 24.28 kg/m   Estimated body mass index is 24.28 kg/m  as calculated from the following:    Height as of 10/7/21: 1.753 m (5' 9\").    Weight as of this encounter: 74.6 kg (164 lb 6.4 oz). Body surface area is 1.91 meters squared.  Severe Pain (7) Comment: Data Unavailable   No LMP for male patient.  Allergies reviewed: Yes  Medications reviewed: No    Medications: Medication refills not needed today.  Pharmacy name entered into [email or web address]: [facility] - [place], [place] - [address]    Clinical concerns: Patient here ambulatory for radiation consult for his meningioma.  Patient states he had surgery about 5 years ago.  Patient currently having intermittent headaches not relieved with Tylenol or Aleve.  Rates his headache pain at a 7 out of 10 when he has it.  Numbness to the left face with the facial droop since surgery about 5 years ago per patient.  15 minutes spent in review of radiation process and potential side effects.  Written information given to patient for review.  Seen by Dr. Littlejohn.  Plan return to clinic for follow-up and/or CT simulation as directed by physician. Dr. Littlejohn was notified.    Radiation Therapy Patient Education    Person involved with teaching: Patient    Patient educational needs for self management of treatment-related side effects assessment completed.  [facility] Patient Ed tab contains Patient Learning Assessment    Education Materials Given  Radiation Therapy and You, " Understanding Radiation Therapy, Radiation Therapy Team, Radiation Therapy Treatment, Radiation Therapy: Managing Short-Term Side Effects, Skin Care During Radiation Therapy, Radiation Therapy: Your Daily Life, Radiation Therapy to the Brain Guidelines, Oncology Supportive Care Services , Welcome Letter, Insurance PA Information and Map [facility]    Educational Topics Discussed  Side effects expected and Skin care    Response To Teaching  More review necessary    GYN Only  Vaginal Dilator-given and educated: N/A    Referrals sent: None    Chemotherapy?  No      Carmen Arreola RN

## 2023-05-23 ENCOUNTER — ALLIED HEALTH/NURSE VISIT (OUTPATIENT)
Dept: RADIATION ONCOLOGY | Facility: HOSPITAL | Age: 63
End: 2023-05-23
Attending: INTERNAL MEDICINE
Payer: COMMERCIAL

## 2023-05-23 DIAGNOSIS — D32.9 MENINGIOMA (H): ICD-10-CM

## 2023-05-23 LAB
CREAT SERPL-MCNC: 0.75 MG/DL (ref 0.67–1.17)
GFR SERPL CREATININE-BSD FRML MDRD: >90 ML/MIN/1.73M2

## 2023-05-23 PROCEDURE — 77334 RADIATION TREATMENT AID(S): CPT | Performed by: RADIOLOGY

## 2023-05-23 PROCEDURE — 36415 COLL VENOUS BLD VENIPUNCTURE: CPT | Performed by: RADIOLOGY

## 2023-05-23 PROCEDURE — 82565 ASSAY OF CREATININE: CPT | Performed by: RADIOLOGY

## 2023-05-23 PROCEDURE — 77334 RADIATION TREATMENT AID(S): CPT | Mod: 26 | Performed by: RADIOLOGY

## 2023-05-23 PROCEDURE — 255N000002 HC RX 255 OP 636: Performed by: RADIOLOGY

## 2023-05-23 RX ADMIN — IOHEXOL 100 ML: 300 INJECTION, SOLUTION INTRAVENOUS at 10:06
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
## 2023-05-24 ENCOUNTER — TELEPHONE (OUTPATIENT)
Dept: RADIATION ONCOLOGY | Facility: HOSPITAL | Age: 63
End: 2023-05-24
Payer: COMMERCIAL

## 2023-05-26 ENCOUNTER — APPOINTMENT (OUTPATIENT)
Dept: RADIATION ONCOLOGY | Facility: HOSPITAL | Age: 63
End: 2023-05-26
Attending: INTERNAL MEDICINE
Payer: COMMERCIAL

## 2023-05-30 ENCOUNTER — TELEPHONE (OUTPATIENT)
Dept: RADIATION ONCOLOGY | Facility: HOSPITAL | Age: 63
End: 2023-05-30
Payer: COMMERCIAL

## 2023-05-30 NOTE — TELEPHONE ENCOUNTER
5/30/23 Pt called stating he is unsure who he should talk with.    I asked him to explain situation and I would assist if able.    Pt said he is being called, texted and harassed about a $154 bill. He stated that insurance had called someone in billing and insurance would be taking care of the charge. He stated that he had spoken to someone as well who was rude. I apologize for his experience    I offered to transfer Pt to Patient Relations and billing Billing. Pt declined.    Pt stated several time he has a headache from this due to the tumor he is having treated.    Pt states he wants to cancel all Rad Onc appts due to the contact for the $154 bill.    I transferred Pt to Rad Onc to cancel as NPS is not able to cancel Ret Pt appts.

## 2023-05-31 ENCOUNTER — TELEPHONE (OUTPATIENT)
Dept: RADIATION ONCOLOGY | Facility: HOSPITAL | Age: 63
End: 2023-05-31
Payer: COMMERCIAL

## 2023-05-31 NOTE — TELEPHONE ENCOUNTER
Left a message for Charles.  Apparently there is some confusion as he thinks he is getting treated at the [facility].  I saw him at [facility] and let him know that would be he be treated here at [facility].  His start date is 6/7/2023 at 8:15 in the morning.  I left all this information and asked him to call back to confirm receipt

## 2023-06-05 ENCOUNTER — APPOINTMENT (OUTPATIENT)
Dept: RADIATION ONCOLOGY | Facility: HOSPITAL | Age: 63
End: 2023-06-05
Attending: RADIOLOGY
Payer: COMMERCIAL

## 2023-06-05 PROCEDURE — 77338 DESIGN MLC DEVICE FOR IMRT: CPT | Performed by: RADIOLOGY

## 2023-06-05 PROCEDURE — 77300 RADIATION THERAPY DOSE PLAN: CPT | Performed by: RADIOLOGY

## 2023-06-05 PROCEDURE — 77338 DESIGN MLC DEVICE FOR IMRT: CPT | Mod: 26 | Performed by: RADIOLOGY

## 2023-06-05 PROCEDURE — 77300 RADIATION THERAPY DOSE PLAN: CPT | Mod: 26 | Performed by: RADIOLOGY

## 2023-06-05 PROCEDURE — 77301 RADIOTHERAPY DOSE PLAN IMRT: CPT | Mod: 26 | Performed by: RADIOLOGY

## 2023-06-05 PROCEDURE — 77301 RADIOTHERAPY DOSE PLAN IMRT: CPT | Performed by: RADIOLOGY

## 2023-06-05 NOTE — TELEPHONE ENCOUNTER
Note to Dr Galvin/GINO VILLAR scheduled 4/12/18:  Suboccipital Resection Right Cerebellopontine Angle Meningioma     Patient is scheduled to undergo brain surgery with Dr Galvin in April and he came to the Emergency room with a possible seizure and Dr. Leonardo thinks it would be helpful to have the EEG prior to his brain surgery as his tumor is in a location that typically does not cause seizures, ie right cerebellopontine angle meningioma scheduled for craniotomy on 4/12/18    Pt is refusing EEG and Neurology appointment       Thanks  Kareen           3 = A little assistance

## 2023-06-06 ENCOUNTER — APPOINTMENT (OUTPATIENT)
Dept: RADIATION ONCOLOGY | Facility: HOSPITAL | Age: 63
End: 2023-06-06
Attending: RADIOLOGY
Payer: COMMERCIAL

## 2023-06-06 PROCEDURE — 77338 DESIGN MLC DEVICE FOR IMRT: CPT | Performed by: RADIOLOGY

## 2023-06-06 PROCEDURE — 77300 RADIATION THERAPY DOSE PLAN: CPT | Performed by: RADIOLOGY

## 2023-06-06 PROCEDURE — 77301 RADIOTHERAPY DOSE PLAN IMRT: CPT | Performed by: RADIOLOGY

## 2023-06-06 PROCEDURE — 99207 PR NO CHARGE LOS: CPT | Performed by: RADIOLOGY

## 2023-06-07 ENCOUNTER — APPOINTMENT (OUTPATIENT)
Dept: RADIATION ONCOLOGY | Facility: HOSPITAL | Age: 63
End: 2023-06-07
Attending: RADIOLOGY
Payer: COMMERCIAL

## 2023-06-07 PROCEDURE — 99207 PR NO CHARGE LOS: CPT | Performed by: RADIOLOGY

## 2023-06-07 PROCEDURE — 77373 STRTCTC BDY RAD THER TX DLVR: CPT | Performed by: RADIOLOGY

## 2023-06-07 PROCEDURE — 77370 RADIATION PHYSICS CONSULT: CPT | Performed by: RADIOLOGY

## 2023-06-09 ENCOUNTER — APPOINTMENT (OUTPATIENT)
Dept: RADIATION ONCOLOGY | Facility: HOSPITAL | Age: 63
End: 2023-06-09
Attending: RADIOLOGY
Payer: COMMERCIAL

## 2023-06-09 PROCEDURE — 77373 STRTCTC BDY RAD THER TX DLVR: CPT | Performed by: RADIOLOGY

## 2023-06-12 ENCOUNTER — APPOINTMENT (OUTPATIENT)
Dept: RADIATION ONCOLOGY | Facility: HOSPITAL | Age: 63
End: 2023-06-12
Attending: RADIOLOGY
Payer: COMMERCIAL

## 2023-06-12 PROCEDURE — 77373 STRTCTC BDY RAD THER TX DLVR: CPT | Performed by: RADIOLOGY

## 2023-06-14 ENCOUNTER — OFFICE VISIT (OUTPATIENT)
Dept: RADIATION ONCOLOGY | Facility: HOSPITAL | Age: 63
End: 2023-06-14
Attending: RADIOLOGY
Payer: COMMERCIAL

## 2023-06-14 VITALS
TEMPERATURE: 97.8 F | HEART RATE: 62 BPM | RESPIRATION RATE: 16 BRPM | OXYGEN SATURATION: 94 % | BODY MASS INDEX: 23.81 KG/M2 | SYSTOLIC BLOOD PRESSURE: 105 MMHG | DIASTOLIC BLOOD PRESSURE: 68 MMHG | WEIGHT: 161.2 LBS

## 2023-06-14 DIAGNOSIS — D32.9 MENINGIOMA (H): Primary | ICD-10-CM

## 2023-06-14 DIAGNOSIS — D32.9 MENINGIOMA (H): ICD-10-CM

## 2023-06-14 DIAGNOSIS — H02.539 EYELID RETRACTION OR LAG: ICD-10-CM

## 2023-06-14 DIAGNOSIS — G51.0 7TH NERVE PALSY: Primary | ICD-10-CM

## 2023-06-14 PROCEDURE — 77373 STRTCTC BDY RAD THER TX DLVR: CPT | Performed by: RADIOLOGY

## 2023-06-14 ASSESSMENT — PAIN SCALES - GENERAL: PAINLEVEL: NO PAIN (0)

## 2023-06-14 NOTE — PROGRESS NOTES
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  All questions and concerns addressed.    Plan:   1) Follow up with MRI  2 months.   2) Patient to call if vision or other neurologic changes occur. He knows to expect fatigue.     Continue radiation treatment as prescribed.  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    Assessment                 Body Site:  Brain Site: Right Pontomedullary Junction  Stereotactic Radiosurgery: No  Concurrent Therapy: No  Today's Dose: 2000  Today's Fraction/Total Fraction Brain: 4/5  Ocular/Visual - other : 0: Normal  Middle ear/hearin: Normal  Tinnitus: 1: Yes, recent (within the last 3 months)  Depressed level of consciousness: 0: Normal  Oriented x of spheres: 4  Neuropathy - motor: 0: Normal  Ataxia: 0: Normal  Speech Impairment (e.g. Dysphasia or aphasia): 0: Normal  Seizures: 0: None  Urinary Incontinence: 0: None  Bowel Incontinence: 0: None  Insomnia: 1: Occasional difficulty sleeping not interfering with function (Chronic)                                     Sexuality Alteration                     Emotional Alteration    Copin: Effective  Comfort Alteration   KPS: 90% Can perform normal activity, minor signs of disease  Fatigue (ONS scale): 2: Mild Fatigue  Pain Location: Denies   Nutrition Alteration   Anorexia: 0: None  Nausea: 0: None  Vomitin: None  Weight: 73.1 kg (161 lb 3.2 oz)  Skin Alteration   Skin Sensation: 0: No problem  Skin Reaction: 0: None  Alopecia: 0: Normal  AUA Assessment                                           Accompanied by        Objective:     Exam:   Vitals:    23 0843   BP: 105/68   Pulse: 62   Resp: 16   Temp: 97.8  F (36.6  C)   TempSrc: Oral   SpO2: 94%   Weight: 73.1 kg (161 lb 3.2 oz)   PainSc: No Pain (0)       Wt Readings from Last 8 Encounters:   23 73.1 kg (161 lb 3.2 oz)   23 74.6 kg (164 lb 6.4 oz)   10/07/21 73.5 kg (162 lb)   20 68.9 kg (152 lb)   19 71.7 kg (158 lb)   04/10/19 71.7 kg (158 lb)   19 71.5 kg (157 lb 9.6 oz)   18 69.1 kg (152 lb 6.4 oz)       General: Alert and oriented, in no acute distress  Charles has no Erythema. No change cranial nerve deficits.     Treatment Summary to Date    Aria chart and setup information reviewed    Mer Littlejohn MD

## 2023-06-14 NOTE — PROGRESS NOTES
SBRT/SRS OTV Note      Assessment / Impression     Meningioma (H) [D32.9]     Tolerating radiation therapy well.  All questions and concerns addressed.    Plan:   1. Final fraction 6/16/2023. Follow up 2 months with scan orders placed.       Continue radiation treatment as prescribed.  Radiation: Site: Right Pontomedullary Junction  Stereotactic Radiosurgery: No  Concurrent Therapy: No  Today's Dose: 2000  Total Dose for Brain: 2500  Today's Fraction/Total Fraction Brain: 4/5    Subjective:      HPI: Charles Santos is a 63 year old male with  Meningioma (H) [D32.9]  Total dose SFT  2500cGy/ 5 fractions to finish 6/16/2023. No steroids used.      The following portions of the patient's history were reviewed and updated as appropriate: allergies, current medications, past family history, past medical history, past social history, past surgical history and problem list.    Assessment                 Body Site:  Brain                                       Sexuality Alteration                    Emotional Alteration       Comfort Alteration       Nutrition Alteration      Skin Alteration      AUA Assessment                                           Accompanied by        Objective:     Exam:   There were no vitals filed for this visit.    Wt Readings from Last 8 Encounters:   06/14/23 73.1 kg (161 lb 3.2 oz)   05/18/23 74.6 kg (164 lb 6.4 oz)   10/07/21 73.5 kg (162 lb)   11/05/20 68.9 kg (152 lb)   06/13/19 71.7 kg (158 lb)   04/10/19 71.7 kg (158 lb)   03/01/19 71.5 kg (157 lb 9.6 oz)   07/11/18 69.1 kg (152 lb 6.4 oz)       General: Alert and oriented, in no acute distress  Charles has no Erythema. No change vision or facial asymmetry     Treatment Summary to Date    Aria chart and setup information reviewed    Mer Littlejohn MD

## 2023-06-14 NOTE — LETTER
6/14/2023         RE: Charles Santos  [address] [address]        Dear Colleague,    Thank you for referring your patient, Charles Santos, to the [facility] RADIATION ONCOLOGY [place]. Please see a copy of my visit note below.                                                       SBRT/SRS OTV Note      Assessment / Impression     Meningioma (H) [D32.9]     Tolerating radiation therapy well.  All questions and concerns addressed.    Plan:   1. Final fraction 6/16/2023. Follow up 2 months with scan orders placed.       Continue radiation treatment as prescribed.  Radiation: Site: Right Pontomedullary Junction  Stereotactic Radiosurgery: No  Concurrent Therapy: No  Today's Dose: 2000  Total Dose for Brain: 2500  Today's Fraction/Total Fraction Brain: 4/5    Subjective:      HPI: Charles Santos is a 63 year old male with  Meningioma (H) [D32.9]  Total dose SFT  2500cGy/ 5 fractions to finish 6/16/2023. No steroids used.      The following portions of the patient's history were reviewed and updated as appropriate: allergies, current medications, past family history, past medical history, past social history, past surgical history and problem list.    Assessment                 Body Site:  Brain                                       Sexuality Alteration                    Emotional Alteration       Comfort Alteration       Nutrition Alteration      Skin Alteration      AUA Assessment                                           Accompanied by        Objective:     Exam:   There were no vitals filed for this visit.    Wt Readings from Last 8 Encounters:   06/14/23 73.1 kg (161 lb 3.2 oz)   05/18/23 74.6 kg (164 lb 6.4 oz)   10/07/21 73.5 kg (162 lb)   11/05/20 68.9 kg (152 lb)   06/13/19 71.7 kg (158 lb)   04/10/19 71.7 kg (158 lb)   03/01/19 71.5 kg (157 lb 9.6 oz)   07/11/18 69.1 kg (152 lb 6.4 oz)       General: Alert and oriented, in no acute distress  Charles has no Erythema. No  change vision or facial asymmetry     Treatment Summary to Date    Aria chart and setup information reviewed    Mer Littlejohn MD      Again, thank you for allowing me to participate in the care of your patient.        Sincerely,        Mer Littlejohn MD

## 2023-06-14 NOTE — LETTER
2023         RE: Charles Santos  [address] [address]        Dear Colleague,    Thank you for referring your patient, hCarles Santos, to the [facility] RADIATION ONCOLOGY [place]. Please see a copy of my visit note below.                                                       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  All questions and concerns addressed.    Plan:     Continue radiation treatment as prescribed.  Radiation: Site: Right Pontomedullary Junction  Stereotactic Radiosurgery: No  Concurrent Therapy: No  Today's Dose: 2000  Total Dose for Brain: 2500  Today's Fraction/Total Fraction Brain: 4/5    Subjective:      HPI: Charles Santos is a 63 year old male with  7th nerve palsy [G51.0]    The following portions of the patient's history were reviewed and updated as appropriate: allergies, current medications, past family history, past medical history, past social history, past surgical history and problem list.    Assessment                 Body Site:  Brain Site: Right Pontomedullary Junction  Stereotactic Radiosurgery: No  Concurrent Therapy: No  Today's Dose: 2000  Today's Fraction/Total Fraction Brain: 4/5  Ocular/Visual - other : 0: Normal  Middle ear/hearin: Normal  Tinnitus: 1: Yes, recent (within the last 3 months)  Depressed level of consciousness: 0: Normal  Oriented x of spheres: 4  Neuropathy - motor: 0: Normal  Ataxia: 0: Normal  Speech Impairment (e.g. Dysphasia or aphasia): 0: Normal  Seizures: 0: None  Urinary Incontinence: 0: None  Bowel Incontinence: 0: None  Insomnia: 1: Occasional difficulty sleeping not interfering with function (Chronic)                                     Sexuality Alteration                    Emotional Alteration    Copin: Effective  Comfort Alteration   KPS: 90% Can perform normal  activity, minor signs of disease  Fatigue (ONS scale): 2: Mild Fatigue  Pain Location: Denies   Nutrition Alteration   Anorexia: 0: None  Nausea: 0: None  Vomitin: None  Weight: 73.1 kg (161 lb 3.2 oz)  Skin Alteration   Skin Sensation: 0: No problem  Skin Reaction: 0: None  Alopecia: 0: Normal  AUA Assessment                                           Accompanied by        Objective:     Exam:   Vitals:    23 0843   BP: 105/68   Pulse: 62   Resp: 16   Temp: 97.8  F (36.6  C)   TempSrc: Oral   SpO2: 94%   Weight: 73.1 kg (161 lb 3.2 oz)   PainSc: No Pain (0)       Wt Readings from Last 8 Encounters:   23 73.1 kg (161 lb 3.2 oz)   23 74.6 kg (164 lb 6.4 oz)   10/07/21 73.5 kg (162 lb)   20 68.9 kg (152 lb)   19 71.7 kg (158 lb)   04/10/19 71.7 kg (158 lb)   19 71.5 kg (157 lb 9.6 oz)   18 69.1 kg (152 lb 6.4 oz)       General: Alert and oriented, in no acute distress  Charles has no Erythema. No change cranial nerve deficits.     Treatment Summary to Date    Aria chart and setup information reviewed    Mer Littlejohn MD      Again, thank you for allowing me to participate in the care of your patient.        Sincerely,        Mer Littlejohn MD

## 2023-06-16 ENCOUNTER — ALLIED HEALTH/NURSE VISIT (OUTPATIENT)
Dept: RADIATION ONCOLOGY | Facility: HOSPITAL | Age: 63
End: 2023-06-16
Attending: RADIOLOGY
Payer: COMMERCIAL

## 2023-06-16 PROCEDURE — 77336 RADIATION PHYSICS CONSULT: CPT | Performed by: RADIOLOGY

## 2023-06-16 PROCEDURE — 77435 SBRT MANAGEMENT: CPT | Performed by: RADIOLOGY

## 2023-06-16 PROCEDURE — 77373 STRTCTC BDY RAD THER TX DLVR: CPT | Performed by: RADIOLOGY

## 2023-06-16 NOTE — PROGRESS NOTES
Pt here for their final radiation treatment. DC instructions given verbally and in writing, pt verbalized their understanding. Encouraged pt to make 2 month MRI &  f/u apt on their way out today.

## 2023-07-10 ENCOUNTER — TELEPHONE (OUTPATIENT)
Dept: OPHTHALMOLOGY | Facility: CLINIC | Age: 63
End: 2023-07-10
Payer: COMMERCIAL

## 2023-07-10 NOTE — TELEPHONE ENCOUNTER
[facility] Call Center    Phone Message    May a detailed message be left on voicemail: yes     Reason for Call: Symptoms or Concerns     If patient has red-flag symptoms, warm transfer to triage line    Current symptom or concernPt is having blurry vision due to radiation, please contact pt to discuss Thank you!    Symptoms have been present for:  1 month    Has patient previously been seen for this? Yes    By Dr Villeda:   Date: 4/23    Are there any new or worsening symptoms? Yes: Pt is having blurry vision due to radiation, please contact pt to discuss Thank you!:         Action Taken: Message routed to:  [facility] ([facility] eye    Travel Screening: Not Applicable

## 2023-07-10 NOTE — TELEPHONE ENCOUNTER
I returned the patient's call and he notes blurred vision two weeks after having radiation treatment.  He states he has no increased double vision.  Appointment made.

## 2023-07-12 ENCOUNTER — OFFICE VISIT (OUTPATIENT)
Dept: OPHTHALMOLOGY | Facility: CLINIC | Age: 63
End: 2023-07-12
Attending: OPHTHALMOLOGY
Payer: COMMERCIAL

## 2023-07-12 DIAGNOSIS — H53.40 VISUAL FIELD DEFECT: Primary | ICD-10-CM

## 2023-07-12 DIAGNOSIS — H49.20 6TH NERVE PALSY, UNSPECIFIED LATERALITY: Primary | ICD-10-CM

## 2023-07-12 DIAGNOSIS — H53.40 VISUAL FIELD DEFECT: ICD-10-CM

## 2023-07-12 PROCEDURE — 92083 EXTENDED VISUAL FIELD XM: CPT | Performed by: OPHTHALMOLOGY

## 2023-07-12 PROCEDURE — 92060 SENSORIMOTOR EXAMINATION: CPT | Performed by: OPHTHALMOLOGY

## 2023-07-12 PROCEDURE — 92133 CPTRZD OPH DX IMG PST SGM ON: CPT | Performed by: OPHTHALMOLOGY

## 2023-07-12 PROCEDURE — 99213 OFFICE O/P EST LOW 20 MIN: CPT | Performed by: OPHTHALMOLOGY

## 2023-07-12 ASSESSMENT — REFRACTION_WEARINGRX
OS_ADD: +2.25
OS_AXIS: 180
OS_SPHERE: +1.50
OS_CYLINDER: +0.50
OD_AXIS: 160
OD_SPHERE: +1.25
OD_CYLINDER: +0.50
OD_ADD: +2.25
SPECS_TYPE: BIFOCAL

## 2023-07-12 ASSESSMENT — SLIT LAMP EXAM - LIDS
COMMENTS: NORMAL
COMMENTS: NORMAL

## 2023-07-12 ASSESSMENT — CONF VISUAL FIELD
OS_INFERIOR_TEMPORAL_RESTRICTION: 0
OS_INFERIOR_NASAL_RESTRICTION: 0
OD_INFERIOR_NASAL_RESTRICTION: 0
OS_SUPERIOR_TEMPORAL_RESTRICTION: 0
OD_INFERIOR_TEMPORAL_RESTRICTION: 0
OD_SUPERIOR_NASAL_RESTRICTION: 0
METHOD: COUNTING FINGERS
OD_SUPERIOR_TEMPORAL_RESTRICTION: 0
OS_NORMAL: 1
OD_NORMAL: 1
OS_SUPERIOR_NASAL_RESTRICTION: 0

## 2023-07-12 ASSESSMENT — TONOMETRY
IOP_METHOD: ICARE
OD_IOP_MMHG: 11
OS_IOP_MMHG: 11

## 2023-07-12 ASSESSMENT — VISUAL ACUITY
METHOD: SNELLEN - LINEAR
OS_CC: 20/30
OD_CC: 20/25
CORRECTION_TYPE: GLASSES

## 2023-07-12 ASSESSMENT — CUP TO DISC RATIO
OS_RATIO: 0.4
OD_RATIO: 0.4

## 2023-07-12 ASSESSMENT — EXTERNAL EXAM - LEFT EYE: OS_EXAM: NORMAL

## 2023-07-12 ASSESSMENT — EXTERNAL EXAM - RIGHT EYE: OD_EXAM: NORMAL

## 2023-07-12 NOTE — NURSING NOTE
Chief Complaints and History of Present Illnesses   Patient presents with     Follow Up For Surgery Of Eye     Chief Complaint(s) and History of Present Illness(es)     Follow Up For Surgery Of Eye            Laterality: both eyes    Associated symptoms: Negative for eye pain, redness, flashes and floaters          Comments    Patient states blurriness from radiation .  RUBY Andino July 12, 2023 8:03 AM

## 2023-07-12 NOTE — PROGRESS NOTES
Assessment & Plan     Charles Santos is a 63 year old male with the following diagnoses:   1. 6th nerve palsy, unspecified laterality    2. Visual field defect       Patient was last seen April 26, 2023 for follow up.  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 Last visit for an eye exam  Was 8/7/19. At that time, he had double vision in right gaze.  He had no ocular misalignment in his prism glasses and significant dryness of the RIGHT eye he was wearing 12 base out in his glasses.  At the time of last visit the patient had worsening double vision and noted to have increased esodeviation.  I obtained MRI and this showed increased size of the meningioma.  He underwent radiation.  About  3-4 days after the radiation he noticed a sudden vision change of blurry vision.  He notes that sometimes back to normal.  If he closes either eye it seems to improve.    On exam, his visual acuity is 20/25 right eye 20/30 left eye.  Visual fields show nonspecific changes in both eyes.  OCT was normal in the left eye was performed poorly in the right eye.  His esotropia appears to have worsened in primary gaze and in right gaze.  He appreciated another 5 prism diopter base out prism over the right eye.  He is currently wearing a 15 base out prism on the left eye.    Patient's sixth nerve palsy is worse.  Likely post radiation edema worsened his eye misalignment.  I will give him an 20 base out Fresnel prism on the left eye.  I recommended a follow-up in 4 months sooner as needed for worsening symptoms.                 Attending Physician Attestation:  Complete documentation of historical and exam elements from today's encounter can be found in the full  encounter summary report (not reduplicated in this progress note).  I personally obtained the chief complaint(s) and history of present illness.  I confirmed and edited as necessary the review of systems, past medical/surgical history, family history, social history, and examination findings as documented by others; and I examined the patient myself.  I personally reviewed the relevant tests, images, and reports as documented above.  I formulated and edited as necessary the assessment and plan and discussed the findings and management plan with the patient and family. - Jonathan Villeda MD

## 2023-08-14 ENCOUNTER — HOSPITAL ENCOUNTER (OUTPATIENT)
Dept: MRI IMAGING | Facility: CLINIC | Age: 63
Discharge: HOME | End: 2023-08-14
Attending: RADIOLOGY | Admitting: RADIOLOGY
Payer: COMMERCIAL

## 2023-08-14 DIAGNOSIS — D32.9 MENINGIOMA (H): ICD-10-CM

## 2023-08-14 PROCEDURE — 255N000002 HC RX 255 OP 636: Mod: JZ | Performed by: RADIOLOGY

## 2023-08-14 PROCEDURE — A9585 GADOBUTROL INJECTION: HCPCS | Mod: JZ | Performed by: RADIOLOGY

## 2023-08-14 PROCEDURE — 70553 MRI BRAIN STEM W/O & W/DYE: CPT

## 2023-08-14 RX ORDER — GADOBUTROL 604.72 MG/ML
7 INJECTION INTRAVENOUS ONCE
Status: COMPLETED | OUTPATIENT
Start: 2023-08-14 | End: 2023-08-14

## 2023-08-14 RX ADMIN — GADOBUTROL 7 ML: 604.72 INJECTION INTRAVENOUS at 10:38

## 2023-08-15 NOTE — PROGRESS NOTES
"[facility] Radiation Oncology Follow Up     Patient: Charles Santos  MRN: [id number]  Date of Service: 08/16/2023       DISEASE TREATED: Right pontomedullary angle meningioma      TYPE OF RADIATION THERAPY ADMINISTERED: Stereotactic fractionated radiotherapy completed 6/16/2023.  Dose 2500 cGy in 5 fractions.     INTERVAL SINCE COMPLETION OF RADIATION THERAPY: 2 months      SUBJECTIVE:  Mr. 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  Pathology showed grade 1 meningioma.  He has had ophthalmologic procedures for postoperative issues the last of which was strabismus surgery in 2019.  He presented to his ophthalmologist Dr. Villeda with recurrence of his horizontal diplopia and MRI showed enlarging meningioma in the right pontomedullary junction.    He was last seen by Dr. Villeda on 7/12/2023 at that time it was felt that his 6th nerve palsy was worse felt likely due to postradiation edema.  His prism was corrected. MRI yesterday reviewed with neuroradiology and tumor was stable without edema.  No new mass effect.      History of HIV infection but his titers on 7/21/2023 were undetectable.        Medications were reviewed and are up to date on EPIC.    The following portions of the patient's history were reviewed and updated as appropriate: allergies, current medications, past family history, past medical history, past social history, past surgical history and problem list.    Review of Systems:      General  Constitutional  Constitutional (WDL): Exceptions to WDL  Fatigue: Fatigue relieved by rest  EENT  Eye Disorders  Eye Disorder (WDL): Exceptions to WDL (wears glasses)  Blurred Vision: Intervention not indicated (\"foggy\", prisim in left lense)  Dry Eye: Asymptomatic OR clinical or diagnostic observations only OR symptoms relieved by lubricants  Ear Disorders  Ear Disorder (WDL): Exceptions to WDL " ([place], hearing aids bilateral)  Respiratory  Respiratory  Respiratory (WDL): Exceptions to WDL (COPD)  Cough: Mild symptoms OR nonprescription intervention indicated  Cardiovascular  Cardiovascular  Cardiovascular (WDL): All cardiovascular elements are within defined limits (no pacemaker)  Gastrointestinal  Gastrointestinal  Gastrointestinal (WDL): All gastrointestinal elements are within defined limits  Musculoskeletal  Musculoskeletal and Connective Tissue Disorders  Musculoskeletal & Connective (WDL): Exceptions to WDL  Myalgia: Mild pain (occasional headaches)  Integumentary  Integumentary  Integumentary (WDL): Exceptions to WDL (mole on back of neck)  Neurological  Neurosensory  Neurosensory (WDL): Exceptions to WDL  Ataxia: Asymptomatic OR clinical or diagnostic observations only OR intervention not indicated (occasionally with vision issues)  Peripheral Sensory Neuropathy: Asymptomatic (left face since surgery, left facial droop)  Genitourinary/Reproductive  Genitourinary  Genitourinary (WDL): All genitourinary elements are within defined limits  Lymphatic  Lymph System Disorders  Lymph (WDL): All lymph elements are within defined limits  Pain  Pain Score: Moderate Pain (4)  AUA Assessment                                                              Accompanied by  Accompanied By: self only    Objective:          PHYSICAL EXAMINATION:    /62 (BP Location: Right arm, Patient Position: Sitting)   Pulse 70   Temp 98.1  F (36.7  C)   Resp 16   Wt 74 kg (163 lb 3.2 oz)   SpO2 95%   BMI 24.10 kg/m         Gen: Alert, in NAD  Eyes: PER, sclera anicteric, right 6th nerve palsy with diplopia right lateral gaze. Lid lag  Pulm: No wheezing, stridor or respiratory distress  CV: Well-perfused, no cyanosis,   Skin: Normal color and turgor  Neurologic: A/Ox3, right VI, VII nerve palsy.   Psychiatric: Appropriate mood and affect      Imaging: Imaging results 30 days: MRI Brain w & w/o contrast    Result Date:  8/15/2023  EXAM: MR BRAIN W/O and W CONTRAST LOCATION: [facility] DATE: 8/14/2023 INDICATION: Meningioma s p SFT completed 6/16/2023. COMPARISON: Brain MRI 5/2/2023, 4/10/2019, 9/12/2018. CONTRAST: Gadavist 7 mL TECHNIQUE: Routine multiplanar multisequence head MRI without and with intravenous contrast. FINDINGS: INTRACRANIAL CONTENTS: There is no evidence for acute or subacute infarction. Weakly restricted diffusion is identified associated with stable broad-based dural attachment homogeneous enhancing extra-axial mass at the right cerebellopontine angle anterior to the right porus acusticus/right IAC. This measures 17 mm at its dural attachment, and 13 mm radially, unchanged dating back to 9/12/2018. Stable degree of mass effect without significant parenchymal edema upon the right anterolateral aspect of the mid donte, partial effacement of the fourth ventricle as before. Dural thickening extends into the anterior aspect of the right porus acusticus and IAC series 10 image 11 as before. Dural thickening also extends anteriorly along the dorsal margin of the clivus across midline series 10 image 10 as before. Small amount of thickening extends to the inferior tentorium cerebelli series 1100 image 105 as before. No new or progressive enhancement abnormality otherwise noted. No additional meningiomas are noted. No additional mass, mass effect, acute or chronic hemorrhage noted intracranially. Patchy and confluent nonspecific T2/FLAIR hyperintensities within the cerebral white matter and donte most consistent with moderate chronic microvascular ischemic change. Mild to moderate generalized cerebral atrophy. No hydrocephalus. Corpus callosum is satisfactory. Normal position of the cerebellar tonsils. Procedural changes right retromastoid craniotomy and volume loss right lateral cerebellar hemisphere as before. SELLA: No abnormality accounting for technique. OSSEOUS STRUCTURES/SOFT TISSUES:  Normal marrow signal. The major intracranial vascular flow voids are maintained. ORBITS: No abnormality accounting for technique. No pathologic orbital enhancement. SINUSES/MASTOIDS: Mild membrane thickening left maxillary sinus and within the ethmoid air cells. Scattered fluid/membrane thickening in the mastoid air cells bilaterally.     IMPRESSION: 1.  Redemonstration of dural based homogeneous enhancing extra-axial mass just anterior to the right CP angle compatible with meningioma with stable size, signal/enhancement characteristics dating back to 9/12/2018. Mass effect upon the right aspect of the donte as before with partial effacement of the fourth ventricle. No parenchymal edema. 2.  Procedural changes retromastoid level and right cerebellar hemisphere stable. 3.  No additional pathologic enhancement. 4.  Nothing for acute or subacute infarction. 5.  No acute or chronic hemorrhage noted. 6.  Parenchymal volume loss and chronic small vessel ischemic/degenerative changes of aging deep white matter of both cerebral hemispheres and donte stable. Stable ventricular caliber from previous.      Impression   (D32.9) Meningioma (H)  (primary encounter diagnosis)  (H49.21) Sixth nerve palsy of right eye  (G51.0) 7th nerve palsy        Assessment & Plan:   1.  Right pontomedullary meningioma status post fractionated SRS completed 6/16/2023.  MRI (reviewed with neuroradiology) shows stability of the mass without edema.  2.  Follow-up with MRI in 6 months unless symptoms dictate otherwise.  3. Has a mole that is changing on his back so referred to dermatology, referral placed   4. Charles not clear as to who prescribes glasses for reading. Will check with Dr Ronal Littlejohn MD  Department of Radiation Oncology   [facility]  Tel: [phone]  Page: [phone]    [facility]  [address] [place] [address]     [facility]   [address] [place]  [address]    CC:  Patient Care Team:  Chapo Zafar MD as PCP - General (Student in organized health care education/training program)  Jonathan Villeda MD as MD (Ophthalmology)  Javier Escalante MD as MD (Neurological Surgery)  [facility] ([facility]  Thomas Salazar MD as MD (Ophthalmology)  Mer Littlejohn MD as MD (Radiation Oncology)  Jonathan Villeda MD as MD (Ophthalmology)  Jonathan Villeda MD as Assigned Surgical Provider  Mer Littlejohn MD as Assigned Cancer Care Provider

## 2023-08-16 ENCOUNTER — OFFICE VISIT (OUTPATIENT)
Dept: RADIATION ONCOLOGY | Facility: HOSPITAL | Age: 63
End: 2023-08-16
Attending: RADIOLOGY
Payer: COMMERCIAL

## 2023-08-16 ENCOUNTER — TELEPHONE (OUTPATIENT)
Dept: RADIATION ONCOLOGY | Facility: HOSPITAL | Age: 63
End: 2023-08-16

## 2023-08-16 VITALS
RESPIRATION RATE: 16 BRPM | BODY MASS INDEX: 24.1 KG/M2 | TEMPERATURE: 98.1 F | DIASTOLIC BLOOD PRESSURE: 62 MMHG | OXYGEN SATURATION: 95 % | SYSTOLIC BLOOD PRESSURE: 119 MMHG | WEIGHT: 163.2 LBS | HEART RATE: 70 BPM

## 2023-08-16 DIAGNOSIS — H49.21 SIXTH NERVE PALSY OF RIGHT EYE: ICD-10-CM

## 2023-08-16 DIAGNOSIS — D22.9 CHANGE IN MOLE: ICD-10-CM

## 2023-08-16 DIAGNOSIS — G51.0 7TH NERVE PALSY: ICD-10-CM

## 2023-08-16 DIAGNOSIS — D32.9 MENINGIOMA (H): Primary | ICD-10-CM

## 2023-08-16 PROCEDURE — G0463 HOSPITAL OUTPT CLINIC VISIT: HCPCS | Performed by: RADIOLOGY

## 2023-08-16 PROCEDURE — 99212 OFFICE O/P EST SF 10 MIN: CPT | Performed by: RADIOLOGY

## 2023-08-16 RX ORDER — NUT.TX.IMPAIRED DIGESTIVE FXN 0.035-1/ML
LIQUID (ML) ORAL
COMMUNITY
Start: 2023-07-18

## 2023-08-16 ASSESSMENT — PAIN SCALES - GENERAL: PAINLEVEL: MODERATE PAIN (4)

## 2023-08-16 NOTE — TELEPHONE ENCOUNTER
Pt called to inform where to get glasses, no answer, LM that Dr. Villeda gave list of optical places on his visit from 7/12 along with a glasses rx, informed pt to refer to that or call Dr. Villeda's office. Informed pt to reach out if any further questions or concerns.

## 2023-08-16 NOTE — LETTER
8/16/2023         RE: Charles Santos  [address] [facility] [address]        Dear Colleague,    Thank you for referring your patient, Charles Santos, to the [facility] RADIATION ONCOLOGY [place]. Please see a copy of my visit note below.    [facility] Radiation Oncology Follow Up     Patient: Charles Santos  MRN: [id number]  Date of Service: 08/16/2023       DISEASE TREATED: Right pontomedullary angle meningioma      TYPE OF RADIATION THERAPY ADMINISTERED: Stereotactic fractionated radiotherapy completed 6/16/2023.  Dose 2500 cGy in 5 fractions.     INTERVAL SINCE COMPLETION OF RADIATION THERAPY: 2 months      SUBJECTIVE:  Mr. 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  Pathology showed grade 1 meningioma.  He has had ophthalmologic procedures for postoperative issues the last of which was strabismus surgery in 2019.  He presented to his ophthalmologist Dr. Villeda with recurrence of his horizontal diplopia and MRI showed enlarging meningioma in the right pontomedullary junction.    He was last seen by Dr. Villeda on 7/12/2023 at that time it was felt that his 6th nerve palsy was worse felt likely due to postradiation edema.  His prism was corrected. MRI yesterday reviewed with neuroradiology and tumor was stable without edema.  No new mass effect.      History of HIV infection but his titers on 7/21/2023 were undetectable.        Medications were reviewed and are up to date on EPIC.    The following portions of the patient's history were reviewed and updated as appropriate: allergies, current medications, past family history, past medical history, past social history, past surgical history and problem list.    Review of Systems:      General  Constitutional  Constitutional (WDL): Exceptions to WDL  Fatigue: Fatigue relieved by rest  EENT  Eye Disorders  Eye Disorder (WDL):  "Exceptions to WDL (wears glasses)  Blurred Vision: Intervention not indicated (\"foggy\", prisim in left lense)  Dry Eye: Asymptomatic OR clinical or diagnostic observations only OR symptoms relieved by lubricants  Ear Disorders  Ear Disorder (WDL): Exceptions to WDL ([place], hearing aids bilateral)  Respiratory  Respiratory  Respiratory (WDL): Exceptions to WDL (COPD)  Cough: Mild symptoms OR nonprescription intervention indicated  Cardiovascular  Cardiovascular  Cardiovascular (WDL): All cardiovascular elements are within defined limits (no pacemaker)  Gastrointestinal  Gastrointestinal  Gastrointestinal (WDL): All gastrointestinal elements are within defined limits  Musculoskeletal  Musculoskeletal and Connective Tissue Disorders  Musculoskeletal & Connective (WDL): Exceptions to WDL  Myalgia: Mild pain (occasional headaches)  Integumentary  Integumentary  Integumentary (WDL): Exceptions to WDL (mole on back of neck)  Neurological  Neurosensory  Neurosensory (WDL): Exceptions to WDL  Ataxia: Asymptomatic OR clinical or diagnostic observations only OR intervention not indicated (occasionally with vision issues)  Peripheral Sensory Neuropathy: Asymptomatic (left face since surgery, left facial droop)  Genitourinary/Reproductive  Genitourinary  Genitourinary (WDL): All genitourinary elements are within defined limits  Lymphatic  Lymph System Disorders  Lymph (WDL): All lymph elements are within defined limits  Pain  Pain Score: Moderate Pain (4)  AUA Assessment                                                              Accompanied by  Accompanied By: self only    Objective:          PHYSICAL EXAMINATION:    /62 (BP Location: Right arm, Patient Position: Sitting)   Pulse 70   Temp 98.1  F (36.7  C)   Resp 16   Wt 74 kg (163 lb 3.2 oz)   SpO2 95%   BMI 24.10 kg/m         Gen: Alert, in NAD  Eyes: PER, sclera anicteric, right 6th nerve palsy with diplopia right lateral gaze. Lid lag  Pulm: No wheezing, stridor " or respiratory distress  CV: Well-perfused, no cyanosis,   Skin: Normal color and turgor  Neurologic: A/Ox3, right VI, VII nerve palsy.   Psychiatric: Appropriate mood and affect      Imaging: Imaging results 30 days: MRI Brain w & w/o contrast    Result Date: 8/15/2023  EXAM: MR BRAIN W/O and W CONTRAST LOCATION: [facility] DATE: 8/14/2023 INDICATION: Meningioma s p SFT completed 6/16/2023. COMPARISON: Brain MRI 5/2/2023, 4/10/2019, 9/12/2018. CONTRAST: Gadavist 7 mL TECHNIQUE: Routine multiplanar multisequence head MRI without and with intravenous contrast. FINDINGS: INTRACRANIAL CONTENTS: There is no evidence for acute or subacute infarction. Weakly restricted diffusion is identified associated with stable broad-based dural attachment homogeneous enhancing extra-axial mass at the right cerebellopontine angle anterior to the right porus acusticus/right IAC. This measures 17 mm at its dural attachment, and 13 mm radially, unchanged dating back to 9/12/2018. Stable degree of mass effect without significant parenchymal edema upon the right anterolateral aspect of the mid donte, partial effacement of the fourth ventricle as before. Dural thickening extends into the anterior aspect of the right porus acusticus and IAC series 10 image 11 as before. Dural thickening also extends anteriorly along the dorsal margin of the clivus across midline series 10 image 10 as before. Small amount of thickening extends to the inferior tentorium cerebelli series 1100 image 105 as before. No new or progressive enhancement abnormality otherwise noted. No additional meningiomas are noted. No additional mass, mass effect, acute or chronic hemorrhage noted intracranially. Patchy and confluent nonspecific T2/FLAIR hyperintensities within the cerebral white matter and donte most consistent with moderate chronic microvascular ischemic change. Mild to moderate generalized cerebral atrophy. No hydrocephalus. Corpus  callosum is satisfactory. Normal position of the cerebellar tonsils. Procedural changes right retromastoid craniotomy and volume loss right lateral cerebellar hemisphere as before. SELLA: No abnormality accounting for technique. OSSEOUS STRUCTURES/SOFT TISSUES: Normal marrow signal. The major intracranial vascular flow voids are maintained. ORBITS: No abnormality accounting for technique. No pathologic orbital enhancement. SINUSES/MASTOIDS: Mild membrane thickening left maxillary sinus and within the ethmoid air cells. Scattered fluid/membrane thickening in the mastoid air cells bilaterally.     IMPRESSION: 1.  Redemonstration of dural based homogeneous enhancing extra-axial mass just anterior to the right CP angle compatible with meningioma with stable size, signal/enhancement characteristics dating back to 9/12/2018. Mass effect upon the right aspect of the donte as before with partial effacement of the fourth ventricle. No parenchymal edema. 2.  Procedural changes retromastoid level and right cerebellar hemisphere stable. 3.  No additional pathologic enhancement. 4.  Nothing for acute or subacute infarction. 5.  No acute or chronic hemorrhage noted. 6.  Parenchymal volume loss and chronic small vessel ischemic/degenerative changes of aging deep white matter of both cerebral hemispheres and donte stable. Stable ventricular caliber from previous.      Impression   (D32.9) Meningioma (H)  (primary encounter diagnosis)  (H49.21) Sixth nerve palsy of right eye  (G51.0) 7th nerve palsy        Assessment & Plan:   1.  Right pontomedullary meningioma status post fractionated SRS completed 6/16/2023.  MRI (reviewed with neuroradiology) shows stability of the mass without edema.  2.  Follow-up with MRI in 6 months unless symptoms dictate otherwise.  3. Has a mole that is changing on his back so referred to dermatology, referral placed   4. Charles not clear as to who prescribes glasses for reading. Will check with Dr Villeda  "          Mer Littlejohn MD  Department of Radiation Oncology   [facility]  Tel: [phone]  Page: [phone]    [facility]  [address] [place] [address]     [facility]   [address] [place] [address]    CC:  Patient Care Team:  Chapo Zafar MD as PCP - General (Student in organized health care education/training program)  Jonathan Villeda MD as MD (Ophthalmology)  Javier Escalante MD as MD (Neurological Surgery)  [facility]  Thomas Salazar MD as MD (Ophthalmology)  Mer Littlejohn MD as MD (Radiation Oncology)  Jonathan Villeda MD as MD (Ophthalmology)  Jonathan Villeda MD as Assigned Surgical Provider  Mer Littlejohn MD as Assigned Cancer Care Provider      Oncology Rooming Note    August 16, 2023 10:03 AM   Charles Santos is a 63 year old male who presents for:    Chief Complaint   Patient presents with     Oncology Clinic Visit     Follow up     Initial Vitals: /62 (BP Location: Right arm, Patient Position: Sitting)   Pulse 70   Temp 98.1  F (36.7  C)   Resp 16   Wt 74 kg (163 lb 3.2 oz)   SpO2 95%   BMI 24.10 kg/m   Estimated body mass index is 24.1 kg/m  as calculated from the following:    Height as of 10/7/21: 1.753 m (5' 9\").    Weight as of this encounter: 74 kg (163 lb 3.2 oz). Body surface area is 1.9 meters squared.  Moderate Pain (4) Comment: Data Unavailable   No LMP for male patient.  Allergies reviewed: Yes  Medications reviewed: Yes    Medications: Medication refills not needed today.  Pharmacy name entered into [employer]: [facility] - [place], [place] - [address] [place]    Clinical concerns: Follow up, MRI done 08/14/2023, vision foggy and continued headaches 2-3 times a week Dr. Littlejohn was notified.      Cherry Mackey, PAULA                Again, thank you for allowing me to " participate in the care of your patient.        Sincerely,        Mer Littlejohn MD

## 2023-08-16 NOTE — PROGRESS NOTES
"Oncology Rooming Note    August 16, 2023 10:03 AM   Charles Santos is a 63 year old male who presents for:    Chief Complaint   Patient presents with    Oncology Clinic Visit     Follow up     Initial Vitals: /62 (BP Location: Right arm, Patient Position: Sitting)   Pulse 70   Temp 98.1  F (36.7  C)   Resp 16   Wt 74 kg (163 lb 3.2 oz)   SpO2 95%   BMI 24.10 kg/m   Estimated body mass index is 24.1 kg/m  as calculated from the following:    Height as of 10/7/21: 1.753 m (5' 9\").    Weight as of this encounter: 74 kg (163 lb 3.2 oz). Body surface area is 1.9 meters squared.  Moderate Pain (4) Comment: Data Unavailable   No LMP for male patient.  Allergies reviewed: Yes  Medications reviewed: Yes    Medications: Medication refills not needed today.  Pharmacy name entered into [employer]: [facility] - [place], [place] - [address]    Clinical concerns: Follow up, MRI done 08/14/2023, vision foggy and continued headaches 2-3 times a week Dr. Littlejohn was notified.      Cherry Mackey, RN              "

## 2023-08-17 ENCOUNTER — TELEPHONE (OUTPATIENT)
Dept: OPHTHALMOLOGY | Facility: CLINIC | Age: 63
End: 2023-08-17
Payer: COMMERCIAL

## 2023-08-17 NOTE — TELEPHONE ENCOUNTER
Spoke to patient. Would not recommend getting new glasses as the prism is changing so would not want to get it ground into glasses.  Patient is having a hard time with his current glasses.  A new prescription does get him seeing better.  Will mail him a copy of his last glasses prescription without any prism.  If he would like us to stick a new Fresnel prism on the new glasses once he gets them he will call us.      Isidra Blevins on 8/17/2023 at 4:21 PM

## 2023-08-17 NOTE — TELEPHONE ENCOUNTER
Pt calling triage line requesting glasses Rx to be mailed to him    Last finalized Rx from 2020    MRx done in April 2023 without prism information and not finalized.    Will forward to Dr. Villeda's team to assist in review if able to finalize Rx per request and mail out.    Donnie Degroot RN 10:57 AM 08/17/23

## 2023-11-15 ENCOUNTER — OFFICE VISIT (OUTPATIENT)
Dept: OPHTHALMOLOGY | Facility: CLINIC | Age: 63
End: 2023-11-15
Payer: COMMERCIAL

## 2023-11-15 DIAGNOSIS — H53.10 SUBJECTIVE VISUAL DISTURBANCE: ICD-10-CM

## 2023-11-15 DIAGNOSIS — H53.10 SUBJECTIVE VISUAL DISTURBANCE: Primary | ICD-10-CM

## 2023-11-15 DIAGNOSIS — H49.20 6TH NERVE PALSY, UNSPECIFIED LATERALITY: Primary | ICD-10-CM

## 2023-11-15 PROCEDURE — 99214 OFFICE O/P EST MOD 30 MIN: CPT | Mod: GC | Performed by: OPHTHALMOLOGY

## 2023-11-15 PROCEDURE — 92060 SENSORIMOTOR EXAMINATION: CPT | Mod: GC | Performed by: OPHTHALMOLOGY

## 2023-11-15 ASSESSMENT — CONF VISUAL FIELD
METHOD: COUNTING FINGERS
OD_INFERIOR_NASAL_RESTRICTION: 0
OS_SUPERIOR_NASAL_RESTRICTION: 0
OD_SUPERIOR_TEMPORAL_RESTRICTION: 0
OD_SUPERIOR_NASAL_RESTRICTION: 0
OD_NORMAL: 1
OS_NORMAL: 1
OD_INFERIOR_TEMPORAL_RESTRICTION: 0
OS_SUPERIOR_TEMPORAL_RESTRICTION: 0
OS_INFERIOR_TEMPORAL_RESTRICTION: 0
OS_INFERIOR_NASAL_RESTRICTION: 0

## 2023-11-15 ASSESSMENT — EXTERNAL EXAM - LEFT EYE: OS_EXAM: NORMAL

## 2023-11-15 ASSESSMENT — TONOMETRY
OS_IOP_MMHG: 09
IOP_METHOD: ICARE
OD_IOP_MMHG: 10

## 2023-11-15 ASSESSMENT — REFRACTION_WEARINGRX
OD_CYLINDER: +0.50
OD_AXIS: 160
OS_ADD: +2.25
OS_CYLINDER: +0.50
OD_SPHERE: +1.25
SPECS_TYPE: BIFOCAL
OS_AXIS: 180
OS_SPHERE: +1.50
OD_ADD: +2.25

## 2023-11-15 ASSESSMENT — EXTERNAL EXAM - RIGHT EYE: OD_EXAM: NORMAL

## 2023-11-15 ASSESSMENT — SLIT LAMP EXAM - LIDS
COMMENTS: NORMAL
COMMENTS: PTOSIS

## 2023-11-15 ASSESSMENT — VISUAL ACUITY
OS_CC: 20/40
METHOD: SNELLEN - LINEAR
CORRECTION_TYPE: GLASSES
OD_CC: 20/25

## 2023-11-15 ASSESSMENT — CUP TO DISC RATIO
OD_RATIO: 0.4
OS_RATIO: 0.4

## 2023-11-15 NOTE — PROGRESS NOTES
Assessment & Plan     Charles Santos is a 63 year old male with the following diagnoses:   1. 6th nerve palsy, unspecified laterality    2. Subjective visual disturbance       Patient was last seen July 12, 2023 for follow up.  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 Patient had worsening double vision and noted to have increased esodeviation on 4/26/23.  I obtained MRI and this showed increased size of the meningioma.  He underwent radiation in 5/2023.  About  3-4 days after the radiation he noticed a sudden vision change of blurry vision.  At last visit patient continued to have worsening 6th nerve palsy and most likely post radiation edema. He was given 20 DAYNE Fresnel prism on the left eye.     Today patient states that his vision is still poor OS only. Says he has to lift his glasses to see through reading portion in order to see clearer. Having a hard time with reading small print. Patient recently had updates glasses within 1 month. Says the sticker prism is also starting to dry out and peel off his glasses.     On exam, his visual acuity is 20/25 right eye 20/40 left eye. Color vision 2/11 OD, 1/11 OS.  His esotropia appears to have worsened in primary gaze and in right gaze.  He appreciated another 5 prism diopter base out prism over the right eye.  He is currently wearing a 15 base out prism on the left eye.    Patient's sixth nerve palsy appears stable. He has a history of strabismus surgery in 2019 with some regression of his esotropia.  We discussed ground-in prism, Fresnel prism, and strabismus surgery.  He wants to see Thomas Salazar MD again.  Will replace 20 base out Fresnel prism today.              Attending  Physician Attestation:  Complete documentation of historical and exam elements from today's encounter can be found in the full encounter summary report (not reduplicated in this progress note).  I personally obtained the chief complaint(s) and history of present illness.  I confirmed and edited as necessary the review of systems, past medical/surgical history, family history, social history, and examination findings as documented by others; and I examined the patient myself.  I personally reviewed the relevant tests, images, and reports as documented above.  I formulated and edited as necessary the assessment and plan and discussed the findings and management plan with the patient and family. I personally reviewed the ophthalmic test(s) associated with this encounter, agree with the interpretation(s) as documented by the resident/fellow, and have edited the corresponding report(s) as necessary.  - Jonathan Pino MD   Fellow, Neuro-Ophthalmology

## 2023-11-15 NOTE — NURSING NOTE
Chief Complaints and History of Present Illnesses   Patient presents with    Follow Up     Chief Complaint(s) and History of Present Illness(es)       Follow Up              Laterality: both eyes    Associated symptoms: Negative for eye pain, headache, fatigue and floaters    Treatments tried: no treatments    Pain scale: 0/10              Comments    Patient states a need to look through readers to see more clearly.

## 2024-01-21 NOTE — PROGRESS NOTES
1. 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  The patient then underwent radiation therapy in May 2023.    Since November examination patient has been wearing a 20 base out temporary prism and feels that this has alleviated his double vision in primary gaze.  His sensorimotor examination shows stable strabismus today as compared to November 2023.  There is perhaps mild worsening of esotropia compared to the July 2023 examination.  His most recent MRI done in August 2023 following his radiation therapy showed stable size and signal/enhancement characteristics as compared to the May 2023 MRI.    Discussed options with this patient including continued observation with a temporary prism versus proceeding with strabismus surgery.  I do expect based upon the radiographic stability of his tumor as well as his demonstrated stable sensorimotor examination over the past 3 months with radiation completed in May 2023 that is reasonable to proceed with strabismus surgery.  Patient indicates that he is very bothered by his residual double vision and his vision remains somewhat impaired even with his temporary prism.    Plan to proceed to strabismus surgery. Plan for right lateral rectus resection on fixed suture.  Patient aware that he may have continued binocular diplopia in right gaze and that he may require prism glasses following surgery.    Note to family medicine- Dr. Rider- about stopping xarelto before strabismus surgery for 3 days.    Charles Santos is a pleasant 63 year old White male who presents to my neuro-ophthalmology clinic today having been referred by Dr. Villeda for strab ryan.     HPI:    Per last note with Dr. Villeda, Patient with meningioma right pontomedullary junction status-post resection April 2018 with resultant right 6th nerve palsy,  right 7th nerve palsy status-post right lower lid retraction repair, lateral canthopexy.  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 Patient had worsening double vision and noted to have increased esodeviation from worsened right cranial nerve 6 palsy on 4/26/23.      MRI on 5/2/23 showed increased size of the meningioma.  He underwent radiation in 5/2023.  About  3-4 days after the radiation he noticed a sudden vision change of blurry vision.  At last visit patient continued to have worsening 6th nerve palsy and most likely post radiation edema. He was given 20 DAYNE Fresnel prism on the left eye.     Prior strabismus surgery by me: 11/4/19  Left medial rectus recession 4.0 mm on adjustable suture- recessed an additional 2 mm during postoperative adjustment     Independent historians:  Patient    Review of outside testing:    MRI Brain O 8/14/23:  IMPRESSION:  1.  Redemonstration of dural based homogeneous enhancing extra-axial mass just anterior to the right CP angle compatible with meningioma with stable size, signal/enhancement characteristics dating back to 9/12/2018. Mass effect upon the right aspect of   the donte as before with partial effacement of the fourth ventricle. No parenchymal edema.     2.  Procedural changes retromastoid level and right cerebellar hemisphere stable.     3.  No additional pathologic enhancement.     4.  Nothing for acute or subacute infarction.     5.  No acute or chronic hemorrhage noted.     6.  Parenchymal volume loss and chronic small vessel ischemic/degenerative changes of aging deep white matter of both cerebral hemispheres and donte stable. Stable ventricular caliber from previous.    Addendum:  There is no significant interval increase in edema at the pontine/CP angle structure level and no evidence for cystic degeneration or necrosis within the meningioma itself with history of interval radiation therapy  from prior studies. Small changes in   this region could result in progressive mass effect upon the cisternal cranial nerve VI segment not proceed on this evaluation and, therefore, on follow-up examination, I would recommend dedicated IAC study which includes heavily T2-weighted thin section   imaging to assess the cisternal cranial nerve segments including right cranial nerve VI for possible mass effect or displacement as well as pathologic enhancement or edema. No other changes to the initial report 08/14/2023.    My interpretation performed today of outside testing:  I have independently reviewed the MRI from August 2023 and agree with the radiology interpretation.      Review of outside clinical notes:    11/15/23 -- Visit with Dr. Villeda  Patient's sixth nerve palsy appears stable. He has a history of strabismus surgery in 2019 with some regression of his esotropia.  We discussed ground-in prism, Fresnel prism, and strabismus surgery.  He wants to see Thomas Salazar MD again.  Will replace 20 base out Fresnel prism today.     Past medical history:    Patient Active Problem List   Diagnosis    Brain mass    Acute pancreatitis    Alcohol abuse    Anticoagulated    Anticoagulation monitoring, INR range 2-3    Anxiety    Backache    Closed fracture of other facial bone    Controlled substance agreement terminated    COPD (chronic obstructive pulmonary disease) (H)    Dyslipidemia    Elevated fasting glucose    Essential (primary) hypertension    Essential hypertension    Genital herpes    HIV positive (H)    Human immunodeficiency virus (HIV) disease (H)    Hyperlipidemia    Hypertriglyceridemia    Left shoulder pain    Long term current use of anticoagulant    Loss of height    Major depressive disorder, single episode    Meningioma (H)    Mixed hyperlipidemia    Nocturnal leg cramps    Pulmonary embolism and infarction (H)    Other pulmonary embolism without acute cor pulmonale, unspecified chronicity (H)     Testicular hypofunction    Pain medication agreement    Personal history of DVT (deep vein thrombosis)    Personality disorder (H)    Phlebitis or thrombophlebitis of lower extremity    PUD (peptic ulcer disease)    Pulmonary embolus (H)    Sixth nerve palsy of right eye    Smoking    Tobacco use disorder    Type 2 diabetes mellitus without complication, without long-term current use of insulin (H)    Depression    Osteopenia    Paralytic lagophthalmos of right eye, unspecified eyelid    Dermatochalasis of both upper eyelids    7th nerve palsy    Eyelid retraction or lag       Patient has a current medication list which includes the following prescription(s): acyclovir, albuterol, amlodipine, aripiprazole, eye lubricant, vitamin c, citalopram, descovy, dolutegravir, duloxetine, esomeprazole, fluticasone, fluticasone, glipizide, hydroxyzine hcl, lorazepam, loreev xr, metformin, metoprolol tartrate, glucerna carbsteady, olopatadine, oxybutynin, polyvinyl alcohol, pravastatin, prednisolone acetate, quviviq, rivaroxaban anticoagulant, spiriva respimat, tretinoin, and vitamin d3, and the following Facility-Administered Medications: botulinum toxin type a.    Family history / social history:  Patient's family history is not on file.     Patient  reports that he has been smoking cigarettes. He has a 25 pack-year smoking history. He has never used smokeless tobacco. He reports that he does not drink alcohol and does not use drugs.     Exam:  Please see epic chart for complete exam     Tests ordered and interpreted today:  Sensorimotor examination today revealed a -1.5 abduction deficit in the right eye with full motility in the left eye.  In primary gaze there is a 20 prism diopter esotropia that remains the same in right gaze and improved in left gaze.      We discussed in detail the risks, benefits, and alternatives of eye muscle correction surgery including the very rare risk of death or serious morbidity from a general  anesthesia complication and the rare risk of severe vision loss in the operative eye(s) secondary to retinal detachment or endophthalmitis.  We discussed more likely sub-optimal outcomes including the unanticipated need for additional strabismus surgery.  Finally the patient was aware that prisms glasses may be required to optimize single vision following surgery.    After a thorough discussion of these risks, the patient decided to proceed with strabismus surgery.  The surgical plan is as follows:     1. Eye muscle correction, right eye     Right lateral rectus resection on fixed suture (maximal)    Follow-up 1 week after strabismus surgery.    Plan to [facility] at: [facility]  Prism free glasses for adjustment: Non adjustable    Patient is on xarelto. Patient has stopped this in the past.  Will ask him to check with Dr. Rider if he can stop 3 days prior to surgery and restart immediately after.      Note to family medicine- Dr. Rider         35 minutes were spent on the date of the encounter by me doing chart review, history and exam, documentation, and further activities as noted above    Complete documentation of historical and exam elements from today's encounter can be found in the full encounter summary report (not reduplicated in this progress note).  I personally obtained the chief complaint(s) and history of present illness.  I confirmed and edited as necessary the review of systems, past medical/surgical history, family history, social history, and examination findings as documented by others; and I examined the patient myself.  I personally reviewed the relevant tests, images, and reports as documented above.  I formulated and edited as necessary the assessment and plan and discussed the findings and management plan with the patient and family.  I personally reviewed the ophthalmic test(s) associated with this encounter, agree with the interpretation(s) as documented by the resident/fellow, and have edited the  corresponding report(s) as necessary.     Thomas Salazar MD    Precharting:  iVgnesh Pino MD   Fellow, Neuro-Ophthalmology

## 2024-01-24 ENCOUNTER — OFFICE VISIT (OUTPATIENT)
Dept: OPHTHALMOLOGY | Facility: CLINIC | Age: 64
End: 2024-01-24
Attending: OPHTHALMOLOGY
Payer: COMMERCIAL

## 2024-01-24 ENCOUNTER — HOSPITAL ENCOUNTER (OUTPATIENT)
Facility: CLINIC | Age: 64
End: 2024-01-24
Attending: OPHTHALMOLOGY | Admitting: OPHTHALMOLOGY
Payer: COMMERCIAL

## 2024-01-24 DIAGNOSIS — H53.10 SUBJECTIVE VISUAL DISTURBANCE: ICD-10-CM

## 2024-01-24 DIAGNOSIS — H50.011 ESOTROPIA OF RIGHT EYE: ICD-10-CM

## 2024-01-24 DIAGNOSIS — H53.2 DOUBLE VISION: Primary | ICD-10-CM

## 2024-01-24 PROCEDURE — V2718 FRESNELL PRISM PRESS-ON LENS: HCPCS

## 2024-01-24 PROCEDURE — 99214 OFFICE O/P EST MOD 30 MIN: CPT | Performed by: OPHTHALMOLOGY

## 2024-01-24 PROCEDURE — 92060 SENSORIMOTOR EXAMINATION: CPT

## 2024-01-24 PROCEDURE — 92060 SENSORIMOTOR EXAMINATION: CPT | Mod: 26 | Performed by: OPHTHALMOLOGY

## 2024-01-24 PROCEDURE — 92060 SENSORIMOTOR EXAMINATION: CPT | Performed by: OPHTHALMOLOGY

## 2024-01-24 PROCEDURE — G0463 HOSPITAL OUTPT CLINIC VISIT: HCPCS | Performed by: OPHTHALMOLOGY

## 2024-01-24 ASSESSMENT — REFRACTION_WEARINGRX
OS_AXIS: 180
OD_ADD: +2.25
OS_CYLINDER: +0.50
OS_SPHERE: +1.50
OD_SPHERE: +1.25
OD_CYLINDER: +0.50
OS_ADD: +2.25
OD_AXIS: 160
SPECS_TYPE: BIFOCAL

## 2024-01-24 ASSESSMENT — CONF VISUAL FIELD
OS_NORMAL: 1
OD_INFERIOR_NASAL_RESTRICTION: 0
OD_SUPERIOR_NASAL_RESTRICTION: 0
OD_SUPERIOR_TEMPORAL_RESTRICTION: 0
OS_INFERIOR_TEMPORAL_RESTRICTION: 0
OS_SUPERIOR_TEMPORAL_RESTRICTION: 0
OD_NORMAL: 1
METHOD: COUNTING FINGERS
OS_SUPERIOR_NASAL_RESTRICTION: 0
OS_INFERIOR_NASAL_RESTRICTION: 0
OD_INFERIOR_TEMPORAL_RESTRICTION: 0

## 2024-01-24 ASSESSMENT — VISUAL ACUITY
METHOD: SNELLEN - LINEAR
OS_CC: 20/50
OD_CC: 20/20
CORRECTION_TYPE: GLASSES
OS_CC+: +1
OD_CC+: -2

## 2024-01-24 ASSESSMENT — EXTERNAL EXAM - RIGHT EYE: OD_EXAM: NORMAL

## 2024-01-24 ASSESSMENT — TONOMETRY
OD_IOP_MMHG: 11
OS_IOP_MMHG: 12
IOP_METHOD: ICARE SOLID JC

## 2024-01-24 ASSESSMENT — CUP TO DISC RATIO
OD_RATIO: 0.4
OS_RATIO: 0.4

## 2024-01-24 ASSESSMENT — SLIT LAMP EXAM - LIDS
COMMENTS: PTOSIS
COMMENTS: NORMAL

## 2024-01-24 ASSESSMENT — MARGIN REFLEX DISTANCE
OD_MRD1: 1
OS_MRD1: 3

## 2024-01-24 ASSESSMENT — EXTERNAL EXAM - LEFT EYE: OS_EXAM: NORMAL

## 2024-01-24 NOTE — LETTER
2024    RE: Charles Santos  : 1960  MRN: [id number]    Dear Dr. Villeda    Thank you for referring your patient, Charles Santos, to my neuro-ophthalmology clinic recently.  After a thorough neuro-ophthalmic history and examination, I came to the following conclusions:     1. 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  The patient then underwent radiation therapy in May 2023.    Since November examination patient has been wearing a 20 base out temporary prism and feels that this has alleviated his double vision in primary gaze.  His sensorimotor examination shows stable strabismus today as compared to 2023.  There is perhaps mild worsening of esotropia compared to the 2023 examination.  His most recent MRI done in 2023 following his radiation therapy showed stable size and signal/enhancement characteristics as compared to the May 2023 MRI.    Discussed options with this patient including continued observation with a temporary prism versus proceeding with strabismus surgery.  I do expect based upon the radiographic stability of his tumor as well as his demonstrated stable sensorimotor examination over the past 3 months with radiation completed in May 2023 that is reasonable to proceed with strabismus surgery.  Patient indicates that he is very bothered by his residual double vision and his vision remains somewhat impaired even with his temporary prism.    Plan to proceed to strabismus surgery. Plan for right lateral rectus resection on fixed suture.  Patient aware that he may have continued binocular diplopia in right gaze and that he may require prism glasses following surgery.    Note to family medicine- Dr. Rider- about stopping xarelto before strabismus surgery for 3 days.    Charles Santos is a pleasant  63 year old White male who presents to my neuro-ophthalmology clinic today having been referred by Dr. Villeda for strab eval.     HPI:  Per last note with Dr. Villeda, Patient with meningioma right pontomedullary junction status-post resection April 2018 with resultant right 6th nerve palsy, right 7th nerve palsy status-post right lower lid retraction repair, lateral canthopexy.  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 Patient had worsening double vision and noted to have increased esodeviation from worsened right cranial nerve 6 palsy on 4/26/23.      MRI on 5/2/23 showed increased size of the meningioma.  He underwent radiation in 5/2023.  About  3-4 days after the radiation he noticed a sudden vision change of blurry vision.  At last visit patient continued to have worsening 6th nerve palsy and most likely post radiation edema. He was given 20 DAYNE Fresnel prism on the left eye.     Prior strabismus surgery by me: 11/4/19  Left medial rectus recession 4.0 mm on adjustable suture- recessed an additional 2 mm during postoperative adjustment     Independent historians: Patient    Review of outside testing:    MRI Brain O 8/14/23:  IMPRESSION:  1.  Redemonstration of dural based homogeneous enhancing extra-axial mass just anterior to the right CP angle compatible with meningioma with stable size, signal/enhancement characteristics dating back to 9/12/2018. Mass effect upon the right aspect of   the donte as before with partial effacement of the fourth ventricle. No parenchymal edema.     2.  Procedural changes retromastoid level and right cerebellar hemisphere stable.     3.  No additional pathologic enhancement.     4.  Nothing for acute or subacute infarction.     5.  No acute or chronic hemorrhage noted.     6.  Parenchymal volume loss and chronic small vessel ischemic/degenerative changes of aging deep white matter of both cerebral hemispheres and  donte stable. Stable ventricular caliber from previous.    Addendum:  There is no significant interval increase in edema at the pontine/CP angle structure level and no evidence for cystic degeneration or necrosis within the meningioma itself with history of interval radiation therapy from prior studies. Small changes in   this region could result in progressive mass effect upon the cisternal cranial nerve VI segment not proceed on this evaluation and, therefore, on follow-up examination, I would recommend dedicated IAC study which includes heavily T2-weighted thin section   imaging to assess the cisternal cranial nerve segments including right cranial nerve VI for possible mass effect or displacement as well as pathologic enhancement or edema. No other changes to the initial report 08/14/2023.    My interpretation performed today of outside testing:  I have independently reviewed the MRI from August 2023 and agree with the radiology interpretation.    Review of outside clinical notes:  11/15/23 -- Visit with Dr. Villeda  Patient's sixth nerve palsy appears stable. He has a history of strabismus surgery in 2019 with some regression of his esotropia.  We discussed ground-in prism, Fresnel prism, and strabismus surgery.  He wants to see Thomas Salazar MD again.  Will replace 20 base out Fresnel prism today.     Past medical history:  Patient Active Problem List   Diagnosis    Brain mass    Acute pancreatitis    Alcohol abuse    Anticoagulated    Anticoagulation monitoring, INR range 2-3    Anxiety    Backache    Closed fracture of other facial bone    Controlled substance agreement terminated    COPD (chronic obstructive pulmonary disease) (H)    Dyslipidemia    Elevated fasting glucose    Essential (primary) hypertension    Essential hypertension    Genital herpes    HIV positive (H)    Human immunodeficiency virus (HIV) disease (H)    Hyperlipidemia    Hypertriglyceridemia    Left shoulder pain    Long term current  use of anticoagulant    Loss of height    Major depressive disorder, single episode    Meningioma (H)    Mixed hyperlipidemia    Nocturnal leg cramps    Pulmonary embolism and infarction (H)    Other pulmonary embolism without acute cor pulmonale, unspecified chronicity (H)    Testicular hypofunction    Pain medication agreement    Personal history of DVT (deep vein thrombosis)    Personality disorder (H)    Phlebitis or thrombophlebitis of lower extremity    PUD (peptic ulcer disease)    Pulmonary embolus (H)    Sixth nerve palsy of right eye    Smoking    Tobacco use disorder    Type 2 diabetes mellitus without complication, without long-term current use of insulin (H)    Depression    Osteopenia    Paralytic lagophthalmos of right eye, unspecified eyelid    Dermatochalasis of both upper eyelids    7th nerve palsy    Eyelid retraction or lag       Patient has a current medication list which includes the following prescription(s): acyclovir, albuterol, amlodipine, aripiprazole, eye lubricant, vitamin c, citalopram, descovy, dolutegravir, duloxetine, esomeprazole, fluticasone, fluticasone, glipizide, hydroxyzine hcl, lorazepam, loreev xr, metformin, metoprolol tartrate, glucerna carbsteady, olopatadine, oxybutynin, polyvinyl alcohol, pravastatin, prednisolone acetate, quviviq, rivaroxaban anticoagulant, spiriva respimat, tretinoin, and vitamin d3, and the following Facility-Administered Medications: botulinum toxin type a.    Family history / social history: Patient's family history is not on file.     Patient  reports that he has been smoking cigarettes. He has a 25 pack-year smoking history. He has never used smokeless tobacco. He reports that he does not drink alcohol and does not use drugs.     Exam: Please see epic chart for complete exam     Tests ordered and interpreted today: Sensorimotor examination today revealed a -1.5 abduction deficit in the right eye with full motility in the left eye.  In primary gaze  there is a 20 prism diopter esotropia that remains the same in right gaze and improved in left gaze.      We discussed in detail the risks, benefits, and alternatives of eye muscle correction surgery including the very rare risk of death or serious morbidity from a general anesthesia complication and the rare risk of severe vision loss in the operative eye(s) secondary to retinal detachment or endophthalmitis.  We discussed more likely sub-optimal outcomes including the unanticipated need for additional strabismus surgery.  Finally the patient was aware that prisms glasses may be required to optimize single vision following surgery.    After a thorough discussion of these risks, the patient decided to proceed with strabismus surgery.  The surgical plan is as follows:     1. Eye muscle correction, right eye     Right lateral rectus resection on fixed suture (maximal)    Follow-up 1 week after strabismus surgery.    [facility] to [facility] at: [facility]  Prism free glasses for adjustment: Non adjustable    Patient is on xarelto. Patient has stopped this in the past.  Will ask him to check with Dr. Rider if he can stop 3 days prior to surgery and restart immediately after.      Note to family medicine- Dr. Rider    Again, thank you for trusting me with the care of your patient.  For further exam details, please feel free to contact our office for additional records.  If you wish to contact me regarding this patient please email me at [facility] [email or web address] or give my clinic a call to arrange a phone conversation.    Sincerely,    Thomas Salazar MD  , Neuro-Ophthalmology and Adult Strabismus Surgery  [facility] in Neuro-Ophthalmology  Department of Ophthalmology and Visual Neurosciences  [facility]

## 2024-02-21 ENCOUNTER — TELEPHONE (OUTPATIENT)
Dept: OPHTHALMOLOGY | Facility: CLINIC | Age: 64
End: 2024-02-21
Payer: COMMERCIAL

## 2024-02-21 NOTE — TELEPHONE ENCOUNTER
Health Call Center    Phone Message    May a detailed message be left on voicemail: yes     Reason for Call: Other: KING CALLED FROM [employer] AND STATES THAT THE PT USE TO RECEIVE BOTOX INJECTIONS FOR HIS DOUBLE VISION AND PT IS LOOKING TO GET THEM STARTED AGAIN . WANTS TO MAKE SURE ITS COVERED SO THEY ARE NEEDING PROCEDURE CODES THAT WOULD BE USED TO KNOW IF THE INJECTIONS WOULD BE COVERED. PLEASE REVIEW AND CONTACT KING [phone]. THANK YOU     Action Taken: Message routed to:  [facility] ([facility] EYE    Travel Screening: Not Applicable

## 2024-02-22 NOTE — TELEPHONE ENCOUNTER
This is Marie's last note on 1/24/24:    Plan to proceed to strabismus surgery. Plan for right lateral rectus resection on fixed suture.  Patient aware that he may have continued binocular diplopia in right gaze and that he may require prism glasses following surgery.    Patient scheduled for surgery 3/25/24.    No talk of injections at last visit.     KING [phone]      Called and spoke to King     Provided the message above     Leobardo is thinking about the botox injections     King Sharpe Communication Facilitator on 2/22/2024 at 1:22 PM

## 2024-03-21 ENCOUNTER — TELEPHONE (OUTPATIENT)
Dept: OPHTHALMOLOGY | Facility: CLINIC | Age: 64
End: 2024-03-21
Payer: COMMERCIAL

## 2024-03-21 NOTE — TELEPHONE ENCOUNTER
3/21/2024 9:53AM Informed Charles that [facility] has requested that his 3/25 surgery be moved to the hospital as his A1C is too high to safely perform surgery at the [facility]. Offered Dr. Salazar's next available [facility] surgery date of 7/29. He was disappointed in the delay, but agreed to the new surgery date. Discussed the need for a new H&P and reviewed the PAN call 1-2 days before surgery with the arrival time and npo instructions.    Also discussed that Dr. Salazar will be at [facility] on 4/29, however, that schedule is currently full. Relayed that I can put him on a wait list and will call him by the AM of 4/26 in the event of a cancellation on 4/29. Suggested he schedule an H&P with his PCP in the PM on 4/26 to be canceled if surgery not available on 4/29. He agreed.    3/21/2024 9:36AM [facility] requesting a call back to [phone] ASAP.

## 2024-04-21 NOTE — PLAN OF CARE
Problem: Craniotomy/Craniectomy/Cranioplasty (Adult)  Goal: Signs and Symptoms of Listed Potential Problems Will be Absent, Minimized or Managed (Craniotomy/Craniectomy/Cranioplasty)  Signs and symptoms of listed potential problems will be absent, minimized or managed by discharge/transition of care (reference Craniotomy/Craniectomy/Cranioplasty (Adult) C[facility]).   POD #2 s/p aborted meningioma resection. VSS. A&O x4. Neuro unchanged: R eye blurry, R facial droop, and R ear Rosebud. Denies pain and nausea. DD2 diet with nectar thick liquids. TF running at 45 mL/hr, increase to goal of 55 mL/hr at 5AM. Voiding spontaneously. No BM. Up with 1 and GB. R posterior head incision CDI and RLQ dressing CDI. Continue to monitor and follow current POC.          Xray Chest 1 View- PORTABLE-Urgent

## 2024-05-13 ENCOUNTER — TELEPHONE (OUTPATIENT)
Dept: OPHTHALMOLOGY | Facility: CLINIC | Age: 64
End: 2024-05-13
Payer: COMMERCIAL

## 2024-05-13 NOTE — TELEPHONE ENCOUNTER
Health Call Center    Phone Message    May a detailed message be left on voicemail: yes     Reason for Call: Other: Patient called stating the prism on his glasses needs to be replaced. He is wondering if he needs to schedule with Dr. Villeda or if this can be a tech visit. Please call patient to advise. Thanks.      Action Taken: Other: eye    Travel Screening: Not Applicable

## 2024-05-15 NOTE — TELEPHONE ENCOUNTER
Pt is calling and requesting an update     I sent this to the orthoptist yesterday     Katherin Sharpe Communication Facilitator on 5/15/2024 at 3:43 PM

## 2024-05-17 ENCOUNTER — TELEPHONE (OUTPATIENT)
Dept: OPHTHALMOLOGY | Facility: CLINIC | Age: 64
End: 2024-05-17
Payer: COMMERCIAL

## 2024-05-17 NOTE — TELEPHONE ENCOUNTER
Patient wanting Fresnel prism replacement.  Scheduled him an orthoptic appt 05/24/23 at 11am.  Date/time/location confirmed with patient.    ROSINA Lee 5/17/2024 12:19 PM

## 2024-05-24 ENCOUNTER — OFFICE VISIT (OUTPATIENT)
Dept: OPHTHALMOLOGY | Facility: CLINIC | Age: 64
End: 2024-05-24
Attending: OPHTHALMOLOGY
Payer: COMMERCIAL

## 2024-05-24 DIAGNOSIS — H49.21 SIXTH CRANIAL NERVE PALSY, RIGHT: Primary | ICD-10-CM

## 2024-05-24 PROCEDURE — G0463 HOSPITAL OUTPT CLINIC VISIT: HCPCS

## 2024-05-24 PROCEDURE — V2718 FRESNELL PRISM PRESS-ON LENS: HCPCS

## 2024-05-24 PROCEDURE — 99207 PR NO CHARGE LOS: CPT

## 2024-05-24 NOTE — PROGRESS NOTES
Assessment & Plan    Charles Santos is a 64 year old male with the following diagnoses:   1. Sixth cranial nerve palsy, right - Right Eye       Here for Fresnel prism replacement.  Patient called and asked for a Fresnel prism replacement, current ones were getting dirty and edges were curling in.  Replaced Fresnel prism 20 prism diopter base out left eye.  Patient has strabismus surgery scheduled with Dr. Salazar 07/29/24.      ROSINA Lee 5/24/2024 10:14 AM

## 2024-05-24 NOTE — Clinical Note
Patient seen in orthoptic clinic today for a Fresnel prism replacement, 20pd base out left eye.  He is having strab sx with you 07/29/24.

## 2024-07-09 ENCOUNTER — OFFICE VISIT (OUTPATIENT)
Dept: OPHTHALMOLOGY | Facility: CLINIC | Age: 64
End: 2024-07-09
Payer: COMMERCIAL

## 2024-07-09 DIAGNOSIS — H49.21 SIXTH CRANIAL NERVE PALSY, RIGHT: ICD-10-CM

## 2024-07-09 DIAGNOSIS — D32.9 MENINGIOMA (H): Primary | ICD-10-CM

## 2024-07-09 DIAGNOSIS — H02.233 PARALYTIC LAGOPHTHALMOS OF RIGHT EYE, UNSPECIFIED EYELID: ICD-10-CM

## 2024-07-09 PROCEDURE — V2718 FRESNELL PRISM PRESS-ON LENS: HCPCS | Performed by: OPHTHALMOLOGY

## 2024-07-09 PROCEDURE — 99213 OFFICE O/P EST LOW 20 MIN: CPT | Performed by: OPHTHALMOLOGY

## 2024-07-09 ASSESSMENT — REFRACTION_WEARINGRX
OS_CYLINDER: +0.50
OD_CYLINDER: +0.50
SPECS_TYPE: BIFOCAL
OD_ADD: +2.25
OS_AXIS: 180
OD_SPHERE: +1.25
OD_AXIS: 160
OS_SPHERE: +1.50
OS_ADD: +2.25

## 2024-07-09 ASSESSMENT — TONOMETRY
IOP_METHOD: ICARE
OS_IOP_MMHG: 12
OD_IOP_MMHG: 13

## 2024-07-09 ASSESSMENT — VISUAL ACUITY
OS_CC: 20/100
METHOD: SNELLEN - LINEAR
OS_PH_CC: 20/80
OD_CC: 20/25
OD_CC+: +1
CORRECTION_TYPE: GLASSES

## 2024-07-09 NOTE — NURSING NOTE
Chief Complaints and History of Present Illnesses   Patient presents with    Follow Up     Follow up S/p Right lower eyelid retraction repair with conjunctivoplasty (2021)     Chief Complaint(s) and History of Present Illness(es)       Follow Up              Associated symptoms: itching and foreign body sensation.  Negative for eye pain    Treatments tried: artificial tears    Pain scale: 0/10    Comments: Follow up S/p Right lower eyelid retraction repair with conjunctivoplasty (2021)              Comments    Pt states he can now fully close right eyelid, and would like his weight removed.   He tells me he has FB sensation and RUL itches daily.   He feels he may have bent the weight while rubbing right eye   He has surgery scheduled with Marie in 3 weeks for muscle correction.     DM2  LBS : 189 this AM  Lab Results       Component                Value               Date                       A1C                      5.8                 04/13/2018             (Last A1c was 11.4 on April 26th 2024)    Naman Cevallos 9:07 AM July 9, 2024

## 2024-07-09 NOTE — PROGRESS NOTES
Chief Complaint(s) and History of Present Illness(es)     Follow Up            Associated symptoms: itching and foreign body sensation.  Negative for   eye pain    Treatments tried: artificial tears    Pain scale: 0/10    Comments: Follow up S/p Right lower eyelid retraction repair with   conjunctivoplasty (2021)          Comments    Pt states he can now fully close right eyelid, and would like his weight   removed.   He tells me he has FB sensation and RUL itches daily.   He feels he may have bent the weight while rubbing right eye   He has surgery scheduled with Marie in 3 weeks for muscle correction.       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 Patient had worsening double vision and noted to have increased esodeviation from worsened right cranial nerve 6 palsy on 4/26/23.       MRI on 5/2/23 showed increased size of the meningioma.  He underwent radiation in 5/2023.   Scheduled for strabismus surgery 7/29/2024    Assessment & Plan     Charles Santos is a 64 year old male with the following diagnoses:   Encounter Diagnoses   Name Primary?    Meningioma (H) Yes    Sixth cranial nerve palsy, right - Right Eye     Paralytic lagophthalmos of right eye, unspecified eyelid      Overall doing well.   No lagophthalmos but delayed blink.  We had a discussion about removing the weight.  The main reason he would like it removed is he feels he has to rub the right eye more frequently.    On exam his cornea is not staining, there is no lagophthalmos, and the lid height is good. He has good lower eyelid tone.    I encouraged him not to have the weight removed as he has been doing well. He is agreeable. He would like a new Fresnel prism today as his old one is getting dirty. This was placed for him.         Patient disposition:   No follow-ups on file.        Attending Physician Attestation: Complete documentation of historical and exam elements from today's encounter can be found in the full encounter summary report (not reduplicated in this progress note). I personally obtained the chief complaint(s) and history of present illness. I confirmed and edited as necessary the review of systems, past medical/surgical history, family history, social history, and examination findings as documented by others; and I examined the patient myself. I personally reviewed the relevant tests, images, and reports as documented above. I formulated and edited as necessary the assessment and plan and discussed the findings and management plan with the patient.  -Radha Guerra MD

## 2024-07-15 ENCOUNTER — HOSPITAL ENCOUNTER (EMERGENCY)
Facility: CLINIC | Age: 64
Discharge: LEFT BEFORE DISCHARGE | End: 2024-07-15
Admitting: EMERGENCY MEDICINE
Payer: COMMERCIAL

## 2024-07-15 VITALS
TEMPERATURE: 97 F | DIASTOLIC BLOOD PRESSURE: 67 MMHG | OXYGEN SATURATION: 94 % | HEART RATE: 78 BPM | HEIGHT: 69 IN | RESPIRATION RATE: 20 BRPM | BODY MASS INDEX: 24.59 KG/M2 | SYSTOLIC BLOOD PRESSURE: 126 MMHG | WEIGHT: 166 LBS

## 2024-07-15 LAB — GLUCOSE BLDC GLUCOMTR-MCNC: 252 MG/DL (ref 70–99)

## 2024-07-15 PROCEDURE — 82962 GLUCOSE BLOOD TEST: CPT

## 2024-07-15 PROCEDURE — 99281 EMR DPT VST MAYX REQ PHY/QHP: CPT

## 2024-07-15 ASSESSMENT — COLUMBIA-SUICIDE SEVERITY RATING SCALE - C-SSRS
2. HAVE YOU ACTUALLY HAD ANY THOUGHTS OF KILLING YOURSELF IN THE PAST MONTH?: NO
6. HAVE YOU EVER DONE ANYTHING, STARTED TO DO ANYTHING, OR PREPARED TO DO ANYTHING TO END YOUR LIFE?: NO
1. IN THE PAST MONTH, HAVE YOU WISHED YOU WERE DEAD OR WISHED YOU COULD GO TO SLEEP AND NOT WAKE UP?: NO

## 2024-07-15 NOTE — ED TRIAGE NOTES
Pt here for hyperglycemia. States he hasn't has his meter in a few days so unable to check sugars. He went to  on 7/5 and it was 455. Also having right arm pain from wrist to inner elbow. BG today is 252     Triage Assessment (Adult)       Row Name 07/15/24 1229          Triage Assessment    Airway WDL WDL        Respiratory WDL    Respiratory WDL WDL        Skin Circulation/Temperature WDL    Skin Circulation/Temperature WDL WDL        Cardiac WDL    Cardiac WDL WDL        Peripheral/Neurovascular WDL    Peripheral Neurovascular WDL WDL        Cognitive/Neuro/Behavioral WDL    Cognitive/Neuro/Behavioral WDL WDL

## 2024-07-26 RX ORDER — MIRTAZAPINE 15 MG/1
15 TABLET, FILM COATED ORAL AT BEDTIME
COMMUNITY

## 2024-07-26 RX ORDER — PROCHLORPERAZINE 25 MG/1
SUPPOSITORY RECTAL
COMMUNITY
Start: 2024-07-10

## 2024-07-26 RX ORDER — BENZONATATE 100 MG/1
100 CAPSULE ORAL 3 TIMES DAILY PRN
COMMUNITY
End: 2024-07-26

## 2024-07-28 ENCOUNTER — ANESTHESIA EVENT (OUTPATIENT)
Dept: SURGERY | Facility: CLINIC | Age: 64
End: 2024-07-28
Payer: COMMERCIAL

## 2024-07-29 ENCOUNTER — HOSPITAL ENCOUNTER (OUTPATIENT)
Facility: CLINIC | Age: 64
Discharge: HOME | End: 2024-07-29
Attending: OPHTHALMOLOGY | Admitting: OPHTHALMOLOGY
Payer: COMMERCIAL

## 2024-07-29 ENCOUNTER — ANESTHESIA (OUTPATIENT)
Dept: SURGERY | Facility: CLINIC | Age: 64
End: 2024-07-29
Payer: COMMERCIAL

## 2024-07-29 VITALS
OXYGEN SATURATION: 94 % | SYSTOLIC BLOOD PRESSURE: 116 MMHG | HEART RATE: 65 BPM | TEMPERATURE: 98.2 F | DIASTOLIC BLOOD PRESSURE: 60 MMHG | RESPIRATION RATE: 12 BRPM | BODY MASS INDEX: 23.41 KG/M2 | WEIGHT: 158.51 LBS

## 2024-07-29 DIAGNOSIS — R73.01 ELEVATED FASTING GLUCOSE: ICD-10-CM

## 2024-07-29 DIAGNOSIS — Z91.148 CONTROLLED SUBSTANCE AGREEMENT TERMINATED: ICD-10-CM

## 2024-07-29 DIAGNOSIS — I80.3 PHLEBITIS OR THROMBOPHLEBITIS OF LOWER EXTREMITY: ICD-10-CM

## 2024-07-29 DIAGNOSIS — I10 ESSENTIAL (PRIMARY) HYPERTENSION: ICD-10-CM

## 2024-07-29 DIAGNOSIS — F60.9 PERSONALITY DISORDER (H): ICD-10-CM

## 2024-07-29 DIAGNOSIS — F17.200 SMOKING: ICD-10-CM

## 2024-07-29 DIAGNOSIS — Z79.01 ANTICOAGULATION MONITORING, INR RANGE 2-3: ICD-10-CM

## 2024-07-29 DIAGNOSIS — F41.9 ANXIETY: ICD-10-CM

## 2024-07-29 DIAGNOSIS — H02.831 DERMATOCHALASIS OF BOTH UPPER EYELIDS: ICD-10-CM

## 2024-07-29 DIAGNOSIS — H02.834 DERMATOCHALASIS OF BOTH UPPER EYELIDS: ICD-10-CM

## 2024-07-29 DIAGNOSIS — I26.99 OTHER PULMONARY EMBOLISM WITHOUT ACUTE COR PULMONALE, UNSPECIFIED CHRONICITY (H): ICD-10-CM

## 2024-07-29 DIAGNOSIS — H49.21 SIXTH NERVE PALSY OF RIGHT EYE: ICD-10-CM

## 2024-07-29 DIAGNOSIS — Z86.718 PERSONAL HISTORY OF DVT (DEEP VEIN THROMBOSIS): ICD-10-CM

## 2024-07-29 DIAGNOSIS — Z79.01 LONG TERM CURRENT USE OF ANTICOAGULANT: ICD-10-CM

## 2024-07-29 DIAGNOSIS — Z79.01 ANTICOAGULATED: ICD-10-CM

## 2024-07-29 DIAGNOSIS — G93.89 BRAIN MASS: ICD-10-CM

## 2024-07-29 DIAGNOSIS — F10.10 ALCOHOL ABUSE: ICD-10-CM

## 2024-07-29 DIAGNOSIS — E78.2 MIXED HYPERLIPIDEMIA: ICD-10-CM

## 2024-07-29 DIAGNOSIS — E78.5 DYSLIPIDEMIA: ICD-10-CM

## 2024-07-29 DIAGNOSIS — E29.1 TESTICULAR HYPOFUNCTION: ICD-10-CM

## 2024-07-29 DIAGNOSIS — Z98.890 STATUS POST EYE SURGERY: Primary | ICD-10-CM

## 2024-07-29 DIAGNOSIS — H53.2 DOUBLE VISION: ICD-10-CM

## 2024-07-29 DIAGNOSIS — B20 HUMAN IMMUNODEFICIENCY VIRUS (HIV) DISEASE (H): ICD-10-CM

## 2024-07-29 DIAGNOSIS — R29.890 LOSS OF HEIGHT: ICD-10-CM

## 2024-07-29 DIAGNOSIS — H02.539 EYELID RETRACTION OR LAG: ICD-10-CM

## 2024-07-29 DIAGNOSIS — E11.9 TYPE 2 DIABETES MELLITUS WITHOUT COMPLICATION, WITHOUT LONG-TERM CURRENT USE OF INSULIN (H): ICD-10-CM

## 2024-07-29 DIAGNOSIS — G51.0 7TH NERVE PALSY: ICD-10-CM

## 2024-07-29 DIAGNOSIS — F17.200 TOBACCO USE DISORDER: ICD-10-CM

## 2024-07-29 DIAGNOSIS — I10 ESSENTIAL HYPERTENSION: ICD-10-CM

## 2024-07-29 DIAGNOSIS — K27.9 PUD (PEPTIC ULCER DISEASE): ICD-10-CM

## 2024-07-29 DIAGNOSIS — I26.99 PULMONARY EMBOLISM AND INFARCTION (H): ICD-10-CM

## 2024-07-29 DIAGNOSIS — H02.233 PARALYTIC LAGOPHTHALMOS OF RIGHT EYE, UNSPECIFIED EYELID: ICD-10-CM

## 2024-07-29 DIAGNOSIS — Z21 HIV POSITIVE (H): ICD-10-CM

## 2024-07-29 DIAGNOSIS — G47.62 NOCTURNAL LEG CRAMPS: ICD-10-CM

## 2024-07-29 DIAGNOSIS — D32.9 MENINGIOMA (H): ICD-10-CM

## 2024-07-29 DIAGNOSIS — E78.1 HYPERTRIGLYCERIDEMIA: ICD-10-CM

## 2024-07-29 DIAGNOSIS — Z02.89 PAIN MEDICATION AGREEMENT: ICD-10-CM

## 2024-07-29 LAB
ANION GAP SERPL CALCULATED.3IONS-SCNC: 10 MMOL/L (ref 7–15)
BUN SERPL-MCNC: 15.5 MG/DL (ref 8–23)
CALCIUM SERPL-MCNC: 8.7 MG/DL (ref 8.8–10.4)
CHLORIDE SERPL-SCNC: 100 MMOL/L (ref 98–107)
CREAT SERPL-MCNC: 0.79 MG/DL (ref 0.67–1.17)
EGFRCR SERPLBLD CKD-EPI 2021: >90 ML/MIN/1.73M2
ERYTHROCYTE [DISTWIDTH] IN BLOOD BY AUTOMATED COUNT: 14.6 % (ref 10–15)
GLUCOSE BLDC GLUCOMTR-MCNC: 248 MG/DL (ref 70–99)
GLUCOSE BLDC GLUCOMTR-MCNC: 328 MG/DL (ref 70–99)
GLUCOSE SERPL-MCNC: 320 MG/DL (ref 70–99)
HCO3 SERPL-SCNC: 23 MMOL/L (ref 22–29)
HCT VFR BLD AUTO: 37.6 % (ref 40–53)
HGB BLD-MCNC: 12.7 G/DL (ref 13.3–17.7)
MCH RBC QN AUTO: 29.4 PG (ref 26.5–33)
MCHC RBC AUTO-ENTMCNC: 33.8 G/DL (ref 31.5–36.5)
MCV RBC AUTO: 87 FL (ref 78–100)
PLATELET # BLD AUTO: 166 10E3/UL (ref 150–450)
POTASSIUM SERPL-SCNC: 4.7 MMOL/L (ref 3.4–5.3)
RBC # BLD AUTO: 4.32 10E6/UL (ref 4.4–5.9)
SODIUM SERPL-SCNC: 133 MMOL/L (ref 135–145)
WBC # BLD AUTO: 7 10E3/UL (ref 4–11)

## 2024-07-29 PROCEDURE — 250N000025 HC SEVOFLURANE, PER MIN: Performed by: OPHTHALMOLOGY

## 2024-07-29 PROCEDURE — 250N000013 HC RX MED GY IP 250 OP 250 PS 637: Performed by: ANESTHESIOLOGY

## 2024-07-29 PROCEDURE — 250N000012 HC RX MED GY IP 250 OP 636 PS 637: Performed by: NURSE ANESTHETIST, CERTIFIED REGISTERED

## 2024-07-29 PROCEDURE — 258N000003 HC RX IP 258 OP 636: Performed by: NURSE ANESTHETIST, CERTIFIED REGISTERED

## 2024-07-29 PROCEDURE — 67311 REVISE EYE MUSCLE: CPT | Performed by: NURSE ANESTHETIST, CERTIFIED REGISTERED

## 2024-07-29 PROCEDURE — 250N000011 HC RX IP 250 OP 636: Performed by: NURSE ANESTHETIST, CERTIFIED REGISTERED

## 2024-07-29 PROCEDURE — 80048 BASIC METABOLIC PNL TOTAL CA: CPT | Performed by: ANESTHESIOLOGY

## 2024-07-29 PROCEDURE — 85027 COMPLETE CBC AUTOMATED: CPT | Performed by: ANESTHESIOLOGY

## 2024-07-29 PROCEDURE — 67311 REVISE EYE MUSCLE: CPT | Performed by: ANESTHESIOLOGY

## 2024-07-29 PROCEDURE — 36415 COLL VENOUS BLD VENIPUNCTURE: CPT | Performed by: ANESTHESIOLOGY

## 2024-07-29 PROCEDURE — 67311 REVISE EYE MUSCLE: CPT | Mod: RT | Performed by: OPHTHALMOLOGY

## 2024-07-29 PROCEDURE — 710N000012 HC RECOVERY PHASE 2, PER MINUTE: Performed by: OPHTHALMOLOGY

## 2024-07-29 PROCEDURE — 710N000010 HC RECOVERY PHASE 1, LEVEL 2, PER MIN: Performed by: OPHTHALMOLOGY

## 2024-07-29 PROCEDURE — 250N000009 HC RX 250: Performed by: NURSE ANESTHETIST, CERTIFIED REGISTERED

## 2024-07-29 PROCEDURE — 370N000017 HC ANESTHESIA TECHNICAL FEE, PER MIN: Performed by: OPHTHALMOLOGY

## 2024-07-29 PROCEDURE — 360N000076 HC SURGERY LEVEL 3, PER MIN: Performed by: OPHTHALMOLOGY

## 2024-07-29 PROCEDURE — 82962 GLUCOSE BLOOD TEST: CPT

## 2024-07-29 PROCEDURE — 250N000009 HC RX 250: Performed by: OPHTHALMOLOGY

## 2024-07-29 PROCEDURE — 272N000001 HC OR GENERAL SUPPLY STERILE: Performed by: OPHTHALMOLOGY

## 2024-07-29 PROCEDURE — 999N000141 HC STATISTIC PRE-PROCEDURE NURSING ASSESSMENT: Performed by: OPHTHALMOLOGY

## 2024-07-29 RX ORDER — ACETAMINOPHEN 325 MG/1
650 TABLET ORAL ONCE
Status: COMPLETED | OUTPATIENT
Start: 2024-07-29 | End: 2024-07-29

## 2024-07-29 RX ORDER — ONDANSETRON 4 MG/1
4 TABLET, ORALLY DISINTEGRATING ORAL EVERY 30 MIN PRN
Status: DISCONTINUED | OUTPATIENT
Start: 2024-07-29 | End: 2024-07-29 | Stop reason: HOSPADM

## 2024-07-29 RX ORDER — PREDNISOLONE ACETATE 10 MG/ML
SUSPENSION/ DROPS OPHTHALMIC
Qty: 10 ML | Refills: 0 | Status: SHIPPED | OUTPATIENT
Start: 2024-07-29 | End: 2024-08-07

## 2024-07-29 RX ORDER — OXYMETAZOLINE HYDROCHLORIDE 0.05 G/100ML
SPRAY NASAL PRN
Status: DISCONTINUED | OUTPATIENT
Start: 2024-07-29 | End: 2024-07-29 | Stop reason: HOSPADM

## 2024-07-29 RX ORDER — NALOXONE HYDROCHLORIDE 0.4 MG/ML
0.1 INJECTION, SOLUTION INTRAMUSCULAR; INTRAVENOUS; SUBCUTANEOUS
Status: DISCONTINUED | OUTPATIENT
Start: 2024-07-29 | End: 2024-07-29 | Stop reason: HOSPADM

## 2024-07-29 RX ORDER — ONDANSETRON 2 MG/ML
4 INJECTION INTRAMUSCULAR; INTRAVENOUS EVERY 30 MIN PRN
Status: DISCONTINUED | OUTPATIENT
Start: 2024-07-29 | End: 2024-07-29 | Stop reason: HOSPADM

## 2024-07-29 RX ORDER — PROPOFOL 10 MG/ML
INJECTION, EMULSION INTRAVENOUS PRN
Status: DISCONTINUED | OUTPATIENT
Start: 2024-07-29 | End: 2024-07-29

## 2024-07-29 RX ORDER — FENTANYL CITRATE 50 UG/ML
INJECTION, SOLUTION INTRAMUSCULAR; INTRAVENOUS PRN
Status: DISCONTINUED | OUTPATIENT
Start: 2024-07-29 | End: 2024-07-29

## 2024-07-29 RX ORDER — FENTANYL CITRATE 50 UG/ML
25 INJECTION, SOLUTION INTRAMUSCULAR; INTRAVENOUS EVERY 5 MIN PRN
Status: DISCONTINUED | OUTPATIENT
Start: 2024-07-29 | End: 2024-07-29 | Stop reason: HOSPADM

## 2024-07-29 RX ORDER — SODIUM CHLORIDE, SODIUM LACTATE, POTASSIUM CHLORIDE, CALCIUM CHLORIDE 600; 310; 30; 20 MG/100ML; MG/100ML; MG/100ML; MG/100ML
INJECTION, SOLUTION INTRAVENOUS CONTINUOUS
Status: DISCONTINUED | OUTPATIENT
Start: 2024-07-29 | End: 2024-07-29 | Stop reason: HOSPADM

## 2024-07-29 RX ORDER — BALANCED SALT SOLUTION 6.4; .75; .48; .3; 3.9; 1.7 MG/ML; MG/ML; MG/ML; MG/ML; MG/ML; MG/ML
SOLUTION OPHTHALMIC PRN
Status: DISCONTINUED | OUTPATIENT
Start: 2024-07-29 | End: 2024-07-29 | Stop reason: HOSPADM

## 2024-07-29 RX ORDER — SODIUM CHLORIDE, SODIUM LACTATE, POTASSIUM CHLORIDE, CALCIUM CHLORIDE 600; 310; 30; 20 MG/100ML; MG/100ML; MG/100ML; MG/100ML
INJECTION, SOLUTION INTRAVENOUS CONTINUOUS PRN
Status: DISCONTINUED | OUTPATIENT
Start: 2024-07-29 | End: 2024-07-29

## 2024-07-29 RX ORDER — LIDOCAINE HYDROCHLORIDE 20 MG/ML
INJECTION, SOLUTION INFILTRATION; PERINEURAL PRN
Status: DISCONTINUED | OUTPATIENT
Start: 2024-07-29 | End: 2024-07-29

## 2024-07-29 RX ORDER — ONDANSETRON 2 MG/ML
INJECTION INTRAMUSCULAR; INTRAVENOUS PRN
Status: DISCONTINUED | OUTPATIENT
Start: 2024-07-29 | End: 2024-07-29

## 2024-07-29 RX ADMIN — Medication 50 MG: at 07:50

## 2024-07-29 RX ADMIN — FENTANYL CITRATE 50 MCG: 50 INJECTION INTRAMUSCULAR; INTRAVENOUS at 08:28

## 2024-07-29 RX ADMIN — MIDAZOLAM 2 MG: 1 INJECTION INTRAMUSCULAR; INTRAVENOUS at 07:26

## 2024-07-29 RX ADMIN — INSULIN HUMAN 7 UNITS: 100 INJECTION, SOLUTION PARENTERAL at 08:24

## 2024-07-29 RX ADMIN — SUGAMMADEX 200 MG: 100 INJECTION, SOLUTION INTRAVENOUS at 08:32

## 2024-07-29 RX ADMIN — LIDOCAINE HYDROCHLORIDE 100 MG: 20 INJECTION, SOLUTION INFILTRATION; PERINEURAL at 07:47

## 2024-07-29 RX ADMIN — ONDANSETRON 4 MG: 2 INJECTION INTRAMUSCULAR; INTRAVENOUS at 08:32

## 2024-07-29 RX ADMIN — PHENYLEPHRINE HYDROCHLORIDE 130 MCG: 10 INJECTION INTRAVENOUS at 07:47

## 2024-07-29 RX ADMIN — PHENYLEPHRINE HYDROCHLORIDE 100 MCG: 10 INJECTION INTRAVENOUS at 07:51

## 2024-07-29 RX ADMIN — SODIUM CHLORIDE, POTASSIUM CHLORIDE, SODIUM LACTATE AND CALCIUM CHLORIDE: 600; 310; 30; 20 INJECTION, SOLUTION INTRAVENOUS at 07:26

## 2024-07-29 RX ADMIN — ACETAMINOPHEN 650 MG: 325 TABLET, FILM COATED ORAL at 10:15

## 2024-07-29 RX ADMIN — PHENYLEPHRINE HYDROCHLORIDE 50 MCG: 10 INJECTION INTRAVENOUS at 08:15

## 2024-07-29 RX ADMIN — PROPOFOL 130 MG: 10 INJECTION, EMULSION INTRAVENOUS at 07:47

## 2024-07-29 RX ADMIN — FENTANYL CITRATE 25 MCG: 50 INJECTION INTRAMUSCULAR; INTRAVENOUS at 08:21

## 2024-07-29 ASSESSMENT — ACTIVITIES OF DAILY LIVING (ADL)
ADLS_ACUITY_SCORE: 25

## 2024-07-29 ASSESSMENT — COPD QUESTIONNAIRES: COPD: 1

## 2024-07-29 NOTE — ANESTHESIA PREPROCEDURE EVALUATION
Anesthesia Pre-Procedure Evaluation    Patient: Charles Santos   MRN: [id number] : 1960        Procedure : Procedure(s):  Eye muscle correction, right eye.          Past Medical History:   Diagnosis Date    Anxiety     Borderline diabetes     Diabetes (H)     GERD (gastroesophageal reflux disease)     HIV (human immunodeficiency virus infection) (H)     HTN (hypertension)     Meningioma (H)     Personal history of PE (pulmonary embolism)     , currently on xarelto      Past Surgical History:   Procedure Laterality Date    BLEPHAROPLASTY Left 2020    Procedure: left upper eyelid blepharoplasty;  Surgeon: Radha Guerra MD;  Location: [facility] OR    CRANIOTOMY, SUBOCCIPITAL N/A 2018    Procedure: CRANIOTOMY, SUBOCCIPITAL;  Suboccipital crainectomy for biopsy of Right Cerebellopontine Angle Meningioma with fat graft;  Surgeon: Azeem Galvin MD;  Location: [facility] OR    DENTAL SURGERY      IMPLANT GOLD WEIGHT EYELID Right 2020    Procedure: Right upper eyelid 0.8 gram weight;  Surgeon: Rdaha Guerra MD;  Location: [facility] OR    RECESSION RESECTION WITH ADJUSTABLE SUTURE Left 2019    Procedure: Left Strabismus Repair, with Adjustable Suture;  Surgeon: Thomas Salazar MD;  Location: [facility]    REPAIR RETRACTION LID Right 2019    Procedure: Right Lower Eyelid Retraction Repair with Thin Alloderm, Temporary Tarsorrhaphy;  Surgeon: Radha Guerra MD;  Location: [facility]    REPAIR RETRACTION LID Right 10/7/2021    Procedure: Right lower eyeild retraction repair;  Surgeon: Radha Guerra MD;  Location: [facility] OR    TARSORRHAPHY Right 2019    Procedure: Temporary Tarsorrhaphy;  Surgeon: Radha Guerra MD;  Location: [facility]      Allergies   Allergen Reactions    Benzalkonium Chloride     Nevirapine      PN: LW Reaction: Burns, Blisters  , Rdz- Tom Syndrome    Other [No Clinical Screening - See Comments]      Neveramune    Zolpidem Other (See  Comments) and Visual Disturbance     Abnormal behavior.  (a side effect, not a true allergy).   Abnormal behavior.  (a side effect, not a true allergy).   Other reaction(s): Hallucinations  Abnormal behavior.  (a side effect, not a true allergy).     Penicillins Rash     1984 1984      Social History     Tobacco Use    Smoking status: Every Day     Current packs/day: 1.00     Average packs/day: 1 pack/day for 25.0 years (25.0 ttl pk-yrs)     Types: Cigarettes    Smokeless tobacco: Never   Substance Use Topics    Alcohol use: No      Wt Readings from Last 1 Encounters:   07/29/24 71.9 kg (158 lb 8.2 oz)        Anesthesia Evaluation            ROS/MED HX  ENT/Pulmonary:     (+)                          COPD,              Neurologic:       Cardiovascular:     (+)  hypertension- -   -  - -                                      METS/Exercise Tolerance:     Hematologic:       Musculoskeletal:       GI/Hepatic:     (+) GERD,                   Renal/Genitourinary:       Endo:     (+)  type II DM,                    Psychiatric/Substance Use:     (+) psychiatric history anxiety       Infectious Disease:       Malignancy:       Other:            Physical Exam    Airway        Mallampati: II   TM distance: > 3 FB   Neck ROM: full     Respiratory Devices and Support         Dental       (+) Edentulous      Cardiovascular   cardiovascular exam normal       Rhythm and rate: regular and normal     Pulmonary   pulmonary exam normal        breath sounds clear to auscultation           OUTSIDE LABS:  CBC:   Lab Results   Component Value Date    WBC 7.9 04/17/2018    WBC 7.8 04/16/2018    HGB 13.3 04/17/2018    HGB 13.6 04/16/2018    HCT 40.2 04/17/2018    HCT 42.1 04/16/2018     04/17/2018     04/16/2018     BMP:   Lab Results   Component Value Date     04/17/2018     04/16/2018    POTASSIUM 3.8 04/17/2018    POTASSIUM 4.0 04/16/2018    CHLORIDE 105 04/17/2018    CHLORIDE 105 04/16/2018    CO2 25 04/17/2018     CO2 25 04/16/2018    BUN 29 04/17/2018    BUN 28 04/16/2018    CR 0.75 05/23/2023    CR 0.76 04/17/2018     (H) 07/29/2024     (H) 07/15/2024     COAGS:   Lab Results   Component Value Date    PTT 29 04/02/2018    INR 0.96 04/02/2018     POC:   Lab Results   Component Value Date     (H) 11/05/2020     HEPATIC:   Lab Results   Component Value Date    ALBUMIN 3.6 03/24/2018    PROTTOTAL 7.2 03/24/2018    ALT 22 03/24/2018    AST 6 03/24/2018    ALKPHOS 78 03/24/2018    BILITOTAL 0.3 03/24/2018     OTHER:   Lab Results   Component Value Date    PH 7.31 (L) 04/12/2018    A1C 5.8 04/13/2018    BILL 9.2 04/17/2018    PHOS 2.6 04/15/2018    MAG 2.3 04/15/2018       Anesthesia Plan    ASA Status:  3       Anesthesia Type: General.     - Airway: ETT   Induction: Intravenous.   Maintenance: Balanced.        Consents    Anesthesia Plan(s) and associated risks, benefits, and realistic alternatives discussed. Questions answered and patient/representative(s) expressed understanding.     - Discussed:     - Discussed with:  Patient            Postoperative Care    Pain management: IV analgesics, Oral pain medications.   PONV prophylaxis: Ondansetron (or other 5HT-3)     Comments:               Stefan Stephen DO    I have reviewed the pertinent notes and labs in the chart from the past 30 days and (re)examined the patient.  Any updates or changes from those notes are reflected in this note.            # Drug Induced Coagulation Defect: home medication list includes an anticoagulant medication

## 2024-07-29 NOTE — ANESTHESIA CARE TRANSFER NOTE
Patient: Charles Santos    Procedure: Procedure(s):  Eye muscle correction, right eye.       Diagnosis: Double vision [H53.2]  Diagnosis Additional Information: No value filed.    Anesthesia Type:   General     Note:    Oropharynx: oropharynx clear of all foreign objects and spontaneously breathing  Level of Consciousness: awake and drowsy  Oxygen Supplementation: face mask  Level of Supplemental Oxygen (L/min / FiO2): 8  Independent Airway: airway patency satisfactory and stable  Dentition: dentition unchanged  Vital Signs Stable: post-procedure vital signs reviewed and stable  Report to RN Given: handoff report given  Patient transferred to: PACU    Handoff Report: Identifed the Patient, Identified the Reponsible Provider, Reviewed the pertinent medical history, Discussed the surgical course, Reviewed Intra-OP anesthesia mangement and issues during anesthesia, Set expectations for post-procedure period and Allowed opportunity for questions and acknowledgement of understanding  Vitals:  Vitals Value Taken Time   BP     Temp     Pulse     Resp     SpO2         Electronically Signed By: TERESA Crane CRNA  July 29, 2024  8:54 AM

## 2024-07-29 NOTE — BRIEF OP NOTE
[facility] [place]    Brief Operative Note     Pre-operative diagnosis: Strabismus [H50.9]  Post-operative diagnosis Same as pre-operative diagnosis    Procedure(s):  Eye muscle correction, right eye.    Surgeons and Role:     * Thomas Salazar MD - Primary     * Tucker East MD - Fellow - Assisting    Anesthesia: General   Estimated Blood Loss: Less than 1 ml    Drains:   None  Specimens:  * No specimens in log *  Findings:   See full operative report.  Complications:             None.  Implants:  * No implants in log *

## 2024-07-29 NOTE — OP NOTE
"ATTENDING SURGEON:  Thomas Salazar MD    DATE OF PROCEDURE:  July 29, 2024    FIRST SURGICAL ASSISTANT:  Tucker Soni MD: Neuro-ophthalmology fellow     ANESTHESIA:  General anesthesia    PREOPERATIVE DIAGNOSIS (ES):  Right cranial nerve 6 palsy   Right esotropia   Binocular diplopia     POSTOPERATIVE DIAGNOSIS (ES):  Same    NAME OF OPERATION:  Right lateral rectus resection 11 mm    INDICATIONS FOR PROCEDURE:    The patient is a pleasant 64 year old male who developed a right cranial nerve 6 palsy secondary to a pontomedullary meningioma back in early 2023. He underwent radiation therapy completed in May 2023 and had a stable sensorimotor exam / strabismus exam in November 2023 through May 2024. He was eager for strabismus surgery to allow him single binocular vision either without prism or with a tolerable amount of ground in prism after surgery.    I warned the patient about the risks, benefits, and alternatives of eye muscle surgery including a relatively small risk for severe infection, retinal detachment, and permanent vision loss of the eye.  I also discussed the possibility of postoperative double vision.  I discussed the possibility that due to postoperative double vision or a postoperative recurrent strabismus, the patient may require additional strabismus procedures in the future.  Understanding these risks, the patient decided to proceed with strabismus surgery.      DESCRIPTION OF PROCEDURE:    After the risks, benefits, and alternatives of eye muscle surgery were discussed with the patient and the patient's questions were answered both in clinic and on the day of surgery, written informed consent was obtained and witnessed in the preoperative holding area on the day of surgery.  The word \"yes\" was written over the right eye indicating that the patient consented to eye muscle correction in the right eye.  An intravenous line was placed and light intravenous sedation was given.  The patient was " transported to the operating room where after appropriate monitors were placed, the patient underwent induction of general anesthesia without complication.      Both eyes were prepped and draped in the usual sterile fashion for ophthalmic surgery and a lid speculum was placed in the right eye.  Forced duction testing in this eye revealed no restriction.  A trapezoidal temporal conjunctival flap was created using conjunctival forceps and Jeimy scissors.  This was reflected backwards to expose the right lateral rectus muscle which was isolated using a Kumar muscle hook.  The muscle was freed from Tenon's capsule with blunt dissection using a Jeimy scissors and cotton swab.  A double armed 6-0 Vicryl suture was then woven across the width of the muscle at a point measured to be 11 mm posterior to the insertion and tied to the edges.  A hemostat straight clamp was then clamped perpendicular to the muscle just anterior to the suture and the distal 10.5 mm muscle segment was excised with a Jeimy scissors and 0.3 forceps.  Both arms of the 6-0 Vicryl suture were then passed partial thickness through the sclera in a crossing swords technique at the physiologic insertion site.  At this point, the ends of the suture were tied together tightly advancing the muscle and completing a resection effect of 11 mm.  The needles were cut off and the ends were cut short.  The conjunctival flap was then re-opposed in the surgical bed and held in place using two 6-0 plain gut sutures.  The lid speculum was removed from the operative eye.     Maxitrol ointment was placed in the right eye.  The patient was awakened from general anesthesia without complication and discharged to the recovery room in stable condition.       SPECIMENS REMOVED:  None.      ESTIMATED BLOOD LOSS:  1 mL or less.    INTRAOPERATIVE FLUIDS:  As per anesthesia records.      SPONGE/INSTRUMENT/NEEDLE COUNTS:  All sponge, instrument, and needle counts were  correct times two.    CONDITION ON DISCHARGE FROM OPERATING ROOM:  Stable.      COMPLICATIONS:  None.     I was present for the entire procedure

## 2024-07-29 NOTE — ANESTHESIA POSTPROCEDURE EVALUATION
Patient: Charles Santos    Procedure: Procedure(s):  Eye muscle correction, right eye.       Anesthesia Type:  General    Note:  Disposition: Inpatient   Postop Pain Control: Uneventful            Sign Out: Well controlled pain   PONV: No   Neuro/Psych: Uneventful            Sign Out: Acceptable/Baseline neuro status   Airway/Respiratory: Uneventful            Sign Out: Acceptable/Baseline resp. status   CV/Hemodynamics: Uneventful            Sign Out: Acceptable CV status; No obvious hypovolemia; No obvious fluid overload   Other NRE: NONE   DID A NON-ROUTINE EVENT OCCUR? No           Last vitals:  Vitals Value Taken Time   /70 07/29/24 0850   Temp 36.5  C (97.7  F) 07/29/24 0850   Pulse 69 07/29/24 0900   Resp 9 07/29/24 0900   SpO2 95 % 07/29/24 0900   Vitals shown include unfiled device data.    Electronically Signed By: Stefan Stephen DO  July 29, 2024  9:01 AM

## 2024-07-29 NOTE — ANESTHESIA PROCEDURE NOTES
Airway       Patient location during procedure: OR       Procedure Start/Stop Times: 7/29/2024 7:54 AM  Staff -        CRNA: Francis Neri APRN CRNA       Performed By: CRNA  Consent for Airway        Urgency: elective  Indications and Patient Condition       Indications for airway management: gloria-procedural       Induction type:intravenous       Mask difficulty assessment: 2 - vent by mask + OA or adjuvant +/- NMBA    Final Airway Details       Final airway type: endotracheal airway       Successful airway: ETT - single and Oral  Endotracheal Airway Details        ETT size (mm): 7.5       Cuffed: yes       Successful intubation technique: direct laryngoscopy       DL Blade Type: Ivan 3       Grade View of Cords: 1       Adjucts: stylet       Position: Right       Measured from: lips       Secured at (cm): 23       Bite block used: None    Post intubation assessment        Placement verified by: capnometry, equal breath sounds and chest rise        Number of attempts at approach: 2 (1st attempt per SRNA.  ETTube into esophagus.  2nd attempt per CRNA. Grade 1 view of vocal cords/ETTube through cords without problems. +ETCO2/BBS)       Number of other approaches attempted: 0       Secured with: tape       Ease of procedure: easy       Dentition: Intact and Unchanged    Medication(s) Administered   Medication Administration Time: 7/29/2024 7:54 AM

## 2024-08-06 NOTE — PROGRESS NOTES
1. 1 week post-op status post strabismus surgery:     -Surgery: 7/29/24      DATE OF PROCEDURE:  July 29, 2024    PREOPERATIVE DIAGNOSIS (ES):  Right cranial nerve 6 palsy   Right esotropia   Binocular diplopia      POSTOPERATIVE DIAGNOSIS (ES):  Same     NAME OF OPERATION:  Right lateral rectus resection 11 mm       - Alignment: excellent  - Diplopia: none  - Healing up appropriately  - Maxitrol ophthalmic ointment: stop  - Start Predforte 1% four times a day in the operative eye(s) and decrease by one drop daily every week.  Course will complete in 1 month.    Return to clinic in 3 months or sooner as needed.       Complete documentation of historical and exam elements from today's encounter can be found in the full encounter summary report (not reduplicated in this progress note).  I personally obtained the chief complaint(s) and history of present illness.  I confirmed and edited as necessary the review of systems, past medical/surgical history, family history, social history, and examination findings as documented by others; and I examined the patient myself.  I personally reviewed the relevant tests, images, and reports as documented above.  I formulated and edited as necessary the assessment and plan and discussed the findings and management plan with the patient and family   Thomas Salazar MD

## 2024-08-07 ENCOUNTER — OFFICE VISIT (OUTPATIENT)
Dept: OPHTHALMOLOGY | Facility: CLINIC | Age: 64
End: 2024-08-07
Attending: OPHTHALMOLOGY
Payer: COMMERCIAL

## 2024-08-07 DIAGNOSIS — H02.539 EYELID RETRACTION OR LAG: ICD-10-CM

## 2024-08-07 DIAGNOSIS — Z98.890 POSTOPERATIVE EYE STATE: Primary | ICD-10-CM

## 2024-08-07 PROCEDURE — 99024 POSTOP FOLLOW-UP VISIT: CPT | Mod: GC | Performed by: OPHTHALMOLOGY

## 2024-08-07 PROCEDURE — G0463 HOSPITAL OUTPT CLINIC VISIT: HCPCS | Performed by: OPHTHALMOLOGY

## 2024-08-07 RX ORDER — PREDNISOLONE ACETATE 10 MG/ML
SUSPENSION/ DROPS OPHTHALMIC
Qty: 10 ML | Refills: 0 | Status: SHIPPED | OUTPATIENT
Start: 2024-08-07 | End: 2024-08-09

## 2024-08-07 ASSESSMENT — TONOMETRY
IOP_METHOD: ICARE
OD_IOP_MMHG: 12
OS_IOP_MMHG: 13

## 2024-08-07 ASSESSMENT — VISUAL ACUITY
OS_CC+: -1
METHOD: SNELLEN - LINEAR
OD_CC: 20/60
CORRECTION_TYPE: GLASSES
OS_CC: 20/20
OD_CC+: -1

## 2024-08-07 ASSESSMENT — EXTERNAL EXAM - RIGHT EYE: OD_EXAM: NORMAL

## 2024-08-07 ASSESSMENT — EXTERNAL EXAM - LEFT EYE: OS_EXAM: NORMAL

## 2024-08-07 ASSESSMENT — SLIT LAMP EXAM - LIDS: COMMENTS: NORMAL

## 2024-08-07 NOTE — NURSING NOTE
Chief Complaint(s) and History of Present Illness(es)       Post Op (Ophthalmology) Both Eyes    In right eye.  Since onset it is stable.  Associated symptoms include Negative for double vision and eye pain.  Pain was noted as 0/10. Additional comments: 1 week post-op, s/p RLRs 11.0mm (07/29/24) right cranial nerve 6th palsy.  Charles reports no eye pain or discomfort, was only experiencing mild discomfort during post-op day 1.  No double vision.  Using the eye ointment as instructed.   ROSINA Lee 8/7/2024 9:41 AM

## 2024-08-08 ASSESSMENT — SLIT LAMP EXAM - LIDS: COMMENTS: NORMAL

## 2024-08-09 ENCOUNTER — TELEPHONE (OUTPATIENT)
Dept: OPHTHALMOLOGY | Facility: CLINIC | Age: 64
End: 2024-08-09
Payer: COMMERCIAL

## 2024-08-09 DIAGNOSIS — H02.539 EYELID RETRACTION OR LAG: ICD-10-CM

## 2024-08-09 RX ORDER — PREDNISOLONE ACETATE 10 MG/ML
SUSPENSION/ DROPS OPHTHALMIC
Qty: 10 ML | Refills: 0 | Status: SHIPPED | OUTPATIENT
Start: 2024-08-09

## 2024-08-09 NOTE — TELEPHONE ENCOUNTER
prednisoLONE acetate (PRED FORTE) 1 % ophthalmic suspension   Disp-10 mL, R-0   Start: 08/07/2024  per pt call needs to be re-sent, not received at pharm.  Called pharm. They did get order 8/7/24. Will consider order just sent as duplicate.

## 2024-08-09 NOTE — TELEPHONE ENCOUNTER
[facility] Call Center    Phone Message    May a detailed message be left on voicemail: yes     Reason for Call: Medication Question or concern regarding medication   Prescription Clarification  Name of Medication: prednisoLONE acetate (PRED FORTE) 1 % ophthalmic suspension  Prescribing Provider: Robbie   Pharmacy: [facility], [place] [id number] [place] [address] - [phone]     What on the order needs clarification? Please resend, pharmacy has not received this medication and patient needs as soon as possible.       Action Taken: Message routed to:  [facility]    Travel Screening: Not Applicable

## 2025-07-28 ENCOUNTER — APPOINTMENT (OUTPATIENT)
Dept: CT IMAGING | Facility: CLINIC | Age: 65
End: 2025-07-28
Attending: FAMILY MEDICINE
Payer: COMMERCIAL

## 2025-07-28 ENCOUNTER — HOSPITAL ENCOUNTER (EMERGENCY)
Facility: CLINIC | Age: 65
Discharge: HOME | End: 2025-07-28
Attending: FAMILY MEDICINE | Admitting: FAMILY MEDICINE
Payer: COMMERCIAL

## 2025-07-28 VITALS
OXYGEN SATURATION: 97 % | RESPIRATION RATE: 16 BRPM | DIASTOLIC BLOOD PRESSURE: 79 MMHG | HEART RATE: 67 BPM | WEIGHT: 139.4 LBS | BODY MASS INDEX: 20.65 KG/M2 | HEIGHT: 69 IN | SYSTOLIC BLOOD PRESSURE: 155 MMHG | TEMPERATURE: 97.7 F

## 2025-07-28 DIAGNOSIS — Z79.01 LONG TERM CURRENT USE OF ANTICOAGULANT: ICD-10-CM

## 2025-07-28 DIAGNOSIS — I70.8 LEFT SUBCLAVIAN ARTERY OCCLUSION: Primary | ICD-10-CM

## 2025-07-28 PROBLEM — K40.90 NON-RECURRENT UNILATERAL INGUINAL HERNIA WITHOUT OBSTRUCTION OR GANGRENE: Status: ACTIVE | Noted: 2020-09-10

## 2025-07-28 PROBLEM — J45.40 MODERATE PERSISTENT ASTHMA WITHOUT COMPLICATION: Status: ACTIVE | Noted: 2019-10-31

## 2025-07-28 PROBLEM — G47.00 INSOMNIA: Status: ACTIVE | Noted: 2021-02-24

## 2025-07-28 PROBLEM — D12.6 ADENOMATOUS POLYP OF COLON: Status: ACTIVE | Noted: 2020-10-28

## 2025-07-28 LAB
ANION GAP SERPL CALCULATED.3IONS-SCNC: 11 MMOL/L (ref 7–15)
BASOPHILS # BLD AUTO: 0 10E3/UL (ref 0–0.2)
BASOPHILS NFR BLD AUTO: 0 %
BUN SERPL-MCNC: 12.9 MG/DL (ref 8–23)
CALCIUM SERPL-MCNC: 9 MG/DL (ref 8.8–10.4)
CHLORIDE SERPL-SCNC: 106 MMOL/L (ref 98–107)
CREAT SERPL-MCNC: 0.73 MG/DL (ref 0.67–1.17)
EGFRCR SERPLBLD CKD-EPI 2021: >90 ML/MIN/1.73M2
EOSINOPHIL # BLD AUTO: 0.1 10E3/UL (ref 0–0.7)
EOSINOPHIL NFR BLD AUTO: 1 %
ERYTHROCYTE [DISTWIDTH] IN BLOOD BY AUTOMATED COUNT: 13.5 % (ref 10–15)
GLUCOSE SERPL-MCNC: 137 MG/DL (ref 70–99)
HCO3 SERPL-SCNC: 21 MMOL/L (ref 22–29)
HCT VFR BLD AUTO: 35.7 % (ref 40–53)
HGB BLD-MCNC: 12.4 G/DL (ref 13.3–17.7)
HOLD SPECIMEN: NORMAL
IMM GRANULOCYTES # BLD: 0 10E3/UL
IMM GRANULOCYTES NFR BLD: 0 %
LYMPHOCYTES # BLD AUTO: 2.6 10E3/UL (ref 0.8–5.3)
LYMPHOCYTES NFR BLD AUTO: 39 %
MCH RBC QN AUTO: 30.7 PG (ref 26.5–33)
MCHC RBC AUTO-ENTMCNC: 34.7 G/DL (ref 31.5–36.5)
MCV RBC AUTO: 88 FL (ref 78–100)
MONOCYTES # BLD AUTO: 0.5 10E3/UL (ref 0–1.3)
MONOCYTES NFR BLD AUTO: 7 %
NEUTROPHILS # BLD AUTO: 3.6 10E3/UL (ref 1.6–8.3)
NEUTROPHILS NFR BLD AUTO: 53 %
NRBC # BLD AUTO: 0 10E3/UL
NRBC BLD AUTO-RTO: 0 /100
PLATELET # BLD AUTO: 200 10E3/UL (ref 150–450)
POTASSIUM SERPL-SCNC: 3.5 MMOL/L (ref 3.4–5.3)
RBC # BLD AUTO: 4.04 10E6/UL (ref 4.4–5.9)
SODIUM SERPL-SCNC: 138 MMOL/L (ref 135–145)
WBC # BLD AUTO: 6.8 10E3/UL (ref 4–11)

## 2025-07-28 PROCEDURE — 85014 HEMATOCRIT: CPT | Performed by: FAMILY MEDICINE

## 2025-07-28 PROCEDURE — 73206 CT ANGIO UPR EXTRM W/O&W/DYE: CPT

## 2025-07-28 PROCEDURE — 85018 HEMOGLOBIN: CPT | Performed by: FAMILY MEDICINE

## 2025-07-28 PROCEDURE — 80051 ELECTROLYTE PANEL: CPT | Performed by: FAMILY MEDICINE

## 2025-07-28 PROCEDURE — 82565 ASSAY OF CREATININE: CPT | Performed by: FAMILY MEDICINE

## 2025-07-28 PROCEDURE — 99285 EMERGENCY DEPT VISIT HI MDM: CPT | Mod: 25 | Performed by: FAMILY MEDICINE

## 2025-07-28 PROCEDURE — 36415 COLL VENOUS BLD VENIPUNCTURE: CPT | Performed by: FAMILY MEDICINE

## 2025-07-28 PROCEDURE — 250N000011 HC RX IP 250 OP 636: Performed by: FAMILY MEDICINE

## 2025-07-28 RX ORDER — IOPAMIDOL 755 MG/ML
90 INJECTION, SOLUTION INTRAVASCULAR ONCE
Status: COMPLETED | OUTPATIENT
Start: 2025-07-28 | End: 2025-07-28

## 2025-07-28 RX ADMIN — IOPAMIDOL 90 ML: 755 INJECTION, SOLUTION INTRAVENOUS at 12:05

## 2025-07-28 ASSESSMENT — ACTIVITIES OF DAILY LIVING (ADL)
ADLS_ACUITY_SCORE: 44

## 2025-07-28 ASSESSMENT — COLUMBIA-SUICIDE SEVERITY RATING SCALE - C-SSRS
2. HAVE YOU ACTUALLY HAD ANY THOUGHTS OF KILLING YOURSELF IN THE PAST MONTH?: NO
1. IN THE PAST MONTH, HAVE YOU WISHED YOU WERE DEAD OR WISHED YOU COULD GO TO SLEEP AND NOT WAKE UP?: NO
6. HAVE YOU EVER DONE ANYTHING, STARTED TO DO ANYTHING, OR PREPARED TO DO ANYTHING TO END YOUR LIFE?: NO

## 2025-07-28 NOTE — DISCHARGE INSTRUCTIONS
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  As you are not having symptoms at this time, this can be followed up as an outpatient.  If you develop severe pain in the arm or difficulty moving the arm, return to the emergency department.  Continue your Xarelto.    Vascular surgery clinic will call you to schedule a follow-up appointment

## 2025-07-28 NOTE — ED PROVIDER NOTES
EMERGENCY DEPARTMENT ENCOUNTER      NAME: Charles Santos  AGE: 65 year old male  YOB: 1960  MRN: [id number]  EVALUATION DATE & TIME: 7/28/2025 10:54 AM    PCP: Arlet Rider    ED PROVIDER: Samm Esquivel M.D.    Chief Complaint   Patient presents with    Hypotension       FINAL IMPRESSION:  1. Left subclavian artery occlusion    2. Long term current use of anticoagulant        ED COURSE & MEDICAL DECISION MAKING:    Pertinent Labs & Imaging studies independently interpreted by me. (See chart for details)  Recent infectious disease visit from September 2020 for which was follow-up for HIV, patient also history of diabetes.  He is currently on tube okay and Descovy.  ED Course as of 07/28/25 1513   Mon Jul 28, 2025   1109 Patient seen and examined, presents today with decreased pulses in left arm.  Patient himself has no complaints, does not have pain or numbness in the arm.  On exam here, blood pressure in the left arm is lower than on the right and no palpable radial pulse in the left elbow capillary refill is normal and patient has no pain with movement of the hand.  CT of the chest with runoff is ordered.   1202 Independently interpreted by me with normal CBC other than mild anemia, normal basic panel.   1230 CTA independently interpreted by me without evidence of coarctation.   1337 Called [place] [facility] due to delay in CT read, by report patient is next   1444 Called [place] [facility] due to delay in CT read.   1500 IMPRESSION:  1.  CTA right arm: Negative CTA.  2.  CTA left arm: Short segment proximal left subclavian artery occlusion, acuity uncertain. Poorly opacified brachial artery and forearm vasculature which may be related to poor inflow. Cannot exclude downstream occlusive disease beyond the axilla.  3.  Pulmonary emphysema.   1501 CTA demonstrates short segment occlusion of the left subclavian artery, will discuss with vascular surgery, patient is already chronically  anticoagulated on Xarelto for history of DVT and PE.   1510 Follow-up with Dr. Polanco, vascular surgery, recommends follow-up in clinic.         At the conclusion of the encounter I discussed the results of all of the tests and the disposition. The questions were answered. The patient or family acknowledged understanding and was agreeable with the care plan.     Medical Decision Making  Care impacted by Anticoagulated State  Discharge. I recommended the patient continue their current prescription strength medication(s): Xarelto. N/A.    MEDICATIONS GIVEN IN THE EMERGENCY:  Medications   iopamidol (ISOVUE-370) solution 90 mL (90 mLs Intravenous $Given 7/28/25 7599)       NEW PRESCRIPTIONS STARTED AT TODAY'S ER VISIT  New Prescriptions    No medications on file       =================================================================    HPI    Patient information was obtained from: Patient      Charles Santos is a 65 year old male with a pertinent history of diabetes, HIV, hypertension, pulmonary ballismus currently anticoagulated who presents to this ED for evaluation of low blood pressure in the left arm.  Patient went to his regular doctor because of some swelling over his eye for a bug bite and they were unable to get blood pressure pulses in the left arm, blood pressure and pulse normal in the right arm.  Patient was sent to the emergency department.  He denies any arm pain, does feel little bit of chest pain, no shortness of breath, no nausea vomiting.  He is on Xarelto.      REVIEW OF SYSTEMS   Review of Systems   All other systems reviewed and negative    PAST MEDICAL HISTORY:  Past Medical History:   Diagnosis Date    Anxiety     Borderline diabetes     Diabetes (H)     GERD (gastroesophageal reflux disease)     HIV (human immunodeficiency virus infection) (H) 1987    HTN (hypertension)     Meningioma (H)     Personal history of PE (pulmonary embolism)     2005, currently on xarelto       PAST SURGICAL  HISTORY:  Past Surgical History:   Procedure Laterality Date    BLEPHAROPLASTY Left 11/5/2020    Procedure: left upper eyelid blepharoplasty;  Surgeon: Radha Guerra MD;  Location: [facility] OR    CRANIOTOMY, SUBOCCIPITAL N/A 4/12/2018    Procedure: CRANIOTOMY, SUBOCCIPITAL;  Suboccipital crainectomy for biopsy of Right Cerebellopontine Angle Meningioma with fat graft;  Surgeon: Azeem Galvin MD;  Location: [facility] OR    DENTAL SURGERY  1990    IMPLANT GOLD WEIGHT EYELID Right 11/5/2020    Procedure: Right upper eyelid 0.8 gram weight;  Surgeon: Radha Guerra MD;  Location: [facility] OR    RECESSION RESECTION (REPAIR STRABISMUS) Right 7/29/2024    Procedure: Eye muscle correction, right eye.;  Surgeon: Thomas Salazar MD;  Location: [facility]    RECESSION RESECTION WITH ADJUSTABLE SUTURE Left 11/4/2019    Procedure: Left Strabismus Repair, with Adjustable Suture;  Surgeon: Thomas Salazar MD;  [facility]    REPAIR RETRACTION LID Right 6/13/2019    Procedure: Right Lower Eyelid Retraction Repair with Thin Alloderm, Temporary Tarsorrhaphy;  Surgeon: Radha Guerra MD;  [facility]    REPAIR RETRACTION LID Right 10/7/2021    Procedure: Right lower eyeild retraction repair;  Surgeon: Radha Guerra MD;  Location: [facility] OR    TARSORRHAPHY Right 6/13/2019    Procedure: Temporary Tarsorrhaphy;  Surgeon: Radha Guerra MD;  Location: [facility]       CURRENT MEDICATIONS:    No current facility-administered medications for this encounter.     Current Outpatient Medications   Medication Sig Dispense Refill    acyclovir (ZOVIRAX) 400 MG tablet Take 800 mg by mouth every evening      albuterol (PROAIR HFA, PROVENTIL HFA, VENTOLIN HFA) 108 (90 BASE) MCG/ACT inhaler Inhale 2 puffs into the lungs every 6 hours as needed       amLODIPine (NORVASC) 10 MG tablet Take 10 mg by mouth every evening      Artificial Tear Ointment (EYE LUBRICANT) OINT 4 times daily      Continuous Glucose Sensor  (DEXCOM G6 SENSOR) MISC       DESCOVY 200-25 MG per tablet Take 1 tablet by mouth daily at 2 pm      dolutegravir (TIVICAY) 50 MG tablet Take 50 mg by mouth daily      esomeprazole (NEXIUM) 40 MG CR capsule Take 40 mg by mouth every evening      fluticasone (FLONASE) 50 MCG/ACT nasal spray Spray 2 sprays into both nostrils as needed       glipiZIDE (GLUCOTROL XL) 5 MG 24 hr tablet Take 5 mg by mouth daily      LORazepam (ATIVAN) 0.5 MG tablet TK 1 T PO QD PRN  0    LOREEV XR 1 MG CS24       metFORMIN (GLUCOPHAGE) 1000 MG tablet Take 1 tablet by mouth 2 times daily (with meals)      metoprolol tartrate (LOPRESSOR) 100 MG tablet Take 100 mg by mouth every evening      mirtazapine (REMERON) 15 MG tablet Take 15 mg by mouth at bedtime Take 1/2 to 1 PO HS      Multiple Vitamin (MULTIVITAMIN ADULT PO)       Nutritional Supplements (GLUCERNA CARBSTEADY) LIQD       olopatadine (PATADAY) 0.2 % ophthalmic solution INSTILL 1 DROP INTO BOTH EYES DAILY AS NEEDED      oxybutynin (DITROPAN) 5 MG tablet Take 1 tablet by mouth At Bedtime      prednisoLONE acetate (PRED FORTE) 1 % ophthalmic suspension Instill 1 drop into operative eye(s) four times a day.  Do NOT start drops until instructed by Surgeon. 10 mL 0    rivaroxaban ANTICOAGULANT (XARELTO) 20 MG TABS tablet Take 1 tablet (20 mg) by mouth every evening         ALLERGIES:  Allergies   Allergen Reactions    Benzalkonium Chloride     Nevirapine      PN: LW Reaction: Burns, Blisters  1997, Rdz- Tom Syndrome    Other [No Clinical Screening - See Comments]      Neveramune    Zolpidem Other (See Comments) and Visual Disturbance     Abnormal behavior.  (a side effect, not a true allergy).   Abnormal behavior.  (a side effect, not a true allergy).   Other reaction(s): Hallucinations  Abnormal behavior.  (a side effect, not a true allergy).     Penicillins Rash     1984 1984       FAMILY HISTORY:  Family History   Problem Relation Age of Onset    Glaucoma No family hx of      "Macular Degeneration No family hx of        SOCIAL HISTORY:   Social History     Socioeconomic History    Marital status: Single   Tobacco Use    Smoking status: Every Day     Current packs/day: 1.00     Average packs/day: 1 pack/day for 25.0 years (25.0 ttl pk-yrs)     Types: Cigarettes    Smokeless tobacco: Never   Substance and Sexual Activity    Alcohol use: No    Drug use: No    Sexual activity: Never     Social Drivers of Health     Financial Resource Strain: Low Risk  (7/25/2024)    [facility] from [facility]    Financial Resource Strain     Difficulty of Paying Living Expenses: 3   Food Insecurity: No Food Insecurity (7/25/2024)    [facility] from [facility]    Food Insecurity     Worried About Running Out of Food in the Last Year: 1   Transportation Needs: No Transportation Needs (7/25/2024)    [facility]    Transportation Needs     Lack of Transportation (Medical): 1   Social Connections: Socially Integrated (7/25/2024)    Received from [facility]    Social Connections     Frequency of Communication with Friends and Family: 0   Housing Stability: Low Risk  (7/25/2024)    Received from [facility]    Housing Stability     Unable to Pay for Housing in the Last Year: 1       VITALS:  BP (!) 153/76   Pulse 65   Temp 97.7  F (36.5  C) (Oral)   Resp 16   Ht 1.753 m (5' 9\")   Wt 63.2 kg (139 lb 6.4 oz)   SpO2 96%   BMI 20.59 kg/m      PHYSICAL EXAM:  Physical Exam  Vitals and nursing note reviewed.   Constitutional:       Appearance: Normal appearance.   HENT:      Head: Normocephalic and atraumatic.      Right Ear: External ear normal.      Left Ear: External ear normal.      Nose: Nose normal.      Mouth/Throat:      Mouth: Mucous membranes are moist.   Eyes:      Extraocular Movements: Extraocular movements intact.      " Conjunctiva/sclera: Conjunctivae normal.      Pupils: Pupils are equal, round, and reactive to light.   Cardiovascular:      Rate and Rhythm: Normal rate and regular rhythm.      Comments: Diminished left radial pulse, left hand is warm with normal capillary refill  Pulmonary:      Effort: Pulmonary effort is normal.      Breath sounds: Normal breath sounds. No wheezing or rales.   Abdominal:      General: Abdomen is flat. There is no distension.      Palpations: Abdomen is soft.      Tenderness: There is no abdominal tenderness. There is no guarding.   Musculoskeletal:         General: Normal range of motion.      Cervical back: Normal range of motion and neck supple.      Right lower leg: No edema.      Left lower leg: No edema.   Lymphadenopathy:      Cervical: No cervical adenopathy.   Skin:     General: Skin is warm and dry.   Neurological:      General: No focal deficit present.      Mental Status: He is alert and oriented to person, place, and time. Mental status is at baseline.      Comments: No gross focal neurologic deficits   Psychiatric:         Mood and Affect: Mood normal.         Behavior: Behavior normal.         Thought Content: Thought content normal.          LAB:  All pertinent labs reviewed and interpreted.  Results for orders placed or performed during the hospital encounter of 07/28/25   CTA Upper Extremity Bilateral Runoff w Contr    Impression    IMPRESSION:  1.  CTA right arm: Negative CTA.  2.  CTA left arm: Short segment proximal left subclavian artery occlusion, acuity uncertain. Poorly opacified brachial artery and forearm vasculature which may be related to poor inflow. Cannot exclude downstream occlusive disease beyond the axilla.  3.  Pulmonary emphysema.   Extra Blue Top Tube   Result Value Ref Range    Hold Specimen [facility]    Extra Red Top Tube   Result Value Ref Range    Hold Specimen [facility]    Extra Green Top (Lithium Heparin) Tube   Result Value Ref Range    Hold Specimen [facility]    Extra  Purple Top Tube   Result Value Ref Range    Hold Specimen [facility]    Basic Metabolic Panel (Limited Occurrences)   Result Value Ref Range    Sodium 138 135 - 145 mmol/L    Potassium 3.5 3.4 - 5.3 mmol/L    Chloride 106 98 - 107 mmol/L    Carbon Dioxide (CO2) 21 (L) 22 - 29 mmol/L    Anion Gap 11 7 - 15 mmol/L    Urea Nitrogen 12.9 8.0 - 23.0 mg/dL    Creatinine 0.73 0.67 - 1.17 mg/dL    GFR Estimate >90 >60 mL/min/1.73m2    Calcium 9.0 8.8 - 10.4 mg/dL    Glucose 137 (H) 70 - 99 mg/dL   CBC with platelets and differential   Result Value Ref Range    WBC Count 6.8 4.0 - 11.0 10e3/uL    RBC Count 4.04 (L) 4.40 - 5.90 10e6/uL    Hemoglobin 12.4 (L) 13.3 - 17.7 g/dL    Hematocrit 35.7 (L) 40.0 - 53.0 %    MCV 88 78 - 100 fL    MCH 30.7 26.5 - 33.0 pg    MCHC 34.7 31.5 - 36.5 g/dL    RDW 13.5 10.0 - 15.0 %    Platelet Count 200 150 - 450 10e3/uL    % Neutrophils 53 %    % Lymphocytes 39 %    % Monocytes 7 %    % Eosinophils 1 %    % Basophils 0 %    % Immature Granulocytes 0 %    NRBCs per 100 WBC 0 <1 /100    Absolute Neutrophils 3.6 1.6 - 8.3 10e3/uL    Absolute Lymphocytes 2.6 0.8 - 5.3 10e3/uL    Absolute Monocytes 0.5 0.0 - 1.3 10e3/uL    Absolute Eosinophils 0.1 0.0 - 0.7 10e3/uL    Absolute Basophils 0.0 0.0 - 0.2 10e3/uL    Absolute Immature Granulocytes 0.0 <=0.4 10e3/uL    Absolute NRBCs 0.0 10e3/uL       RADIOLOGY:  Reviewed all pertinent imaging. Please see official radiology report.  CTA Upper Extremity Bilateral Runoff w Contr   Final Result   IMPRESSION:   1.  CTA right arm: Negative CTA.   2.  CTA left arm: Short segment proximal left subclavian artery occlusion, acuity uncertain. Poorly opacified brachial artery and forearm vasculature which may be related to poor inflow. Cannot exclude downstream occlusive disease beyond the axilla.   3.  Pulmonary emphysema.          Samm Esquivel M.D.  Emergency Medicine  [facility] EMERGENCY ROOM  [id number]  [facility] [address]  [phone]  Dept: [phone]       Samm Esquivel MD  07/28/25 1518

## 2025-07-28 NOTE — ED TRIAGE NOTES
The patient presents to the ED from [facility] urgent care, where he went to be seen for a bug bite on his eye that has been ongoing for a couple of weeks. The patient denies fever. They report they were unable to obtain a pulse or a blood pressure on the left arm. He reports that they noted a low blood pressure on the left when they did get a blood pressure reading eventually and stated there was a large difference from the blood pressure they obtained on the right side. The patient's left arm is warm to the touch and pink but unable to palpate a radial pulse in triage. History of blood clots. He takes Xaralto.

## 2025-07-29 ENCOUNTER — TELEPHONE (OUTPATIENT)
Dept: VASCULAR SURGERY | Facility: CLINIC | Age: 65
End: 2025-07-29
Payer: COMMERCIAL

## 2025-07-29 DIAGNOSIS — I77.1 SUBCLAVIAN ARTERY STENOSIS: Primary | ICD-10-CM

## 2025-07-29 DIAGNOSIS — R09.89 OTHER SPECIFIED SYMPTOMS AND SIGNS INVOLVING THE CIRCULATORY AND RESPIRATORY SYSTEMS: ICD-10-CM

## 2025-07-29 NOTE — TELEPHONE ENCOUNTER
Vascular Referral Intake    Appointment note (to be pasted into appt note. Also add where additional info is located ie: outside images pushed to PACS, in Epic, sent to HIM, etc): Blood pressure in the left arm is lower than on the right and no palpable radial pulse in the left elbow, ER physician discussed with Dr. LE    Referred by Samm Esquivel MD for Left subclavian artery occlusion     Specialty: Vascular Surgery    Is there more than One Provider Consult Needed: No (If yes, please list in specific provider and explain under special instructions)    Specific Provider if Necessary:  Dr. Whit Carlos or Dr. Corine Polanco    Visit Type: New    Time Frame: 2-3 weeks     Testing/Imaging Needed Before Consult: US Carotid    [place] or bed needed: No    Special Instructions: No    *Schedulers: Please send welcome letter to patient after appointment(s) scheduled*

## 2025-08-26 ENCOUNTER — ANCILLARY PROCEDURE (OUTPATIENT)
Dept: VASCULAR ULTRASOUND | Facility: CLINIC | Age: 65
End: 2025-08-26
Attending: SURGERY
Payer: COMMERCIAL

## 2025-08-26 DIAGNOSIS — I77.1 SUBCLAVIAN ARTERY STENOSIS: ICD-10-CM

## 2025-08-26 DIAGNOSIS — R09.89 OTHER SPECIFIED SYMPTOMS AND SIGNS INVOLVING THE CIRCULATORY AND RESPIRATORY SYSTEMS: ICD-10-CM

## 2025-08-26 PROCEDURE — 93880 EXTRACRANIAL BILAT STUDY: CPT

## 2025-08-28 ENCOUNTER — OFFICE VISIT (OUTPATIENT)
Dept: VASCULAR SURGERY | Facility: CLINIC | Age: 65
End: 2025-08-28
Attending: FAMILY MEDICINE
Payer: COMMERCIAL

## 2025-08-28 ENCOUNTER — TELEPHONE (OUTPATIENT)
Dept: VASCULAR SURGERY | Facility: CLINIC | Age: 65
End: 2025-08-28
Payer: COMMERCIAL

## 2025-08-28 VITALS
HEART RATE: 73 BPM | OXYGEN SATURATION: 98 % | BODY MASS INDEX: 20.7 KG/M2 | SYSTOLIC BLOOD PRESSURE: 108 MMHG | WEIGHT: 140.2 LBS | RESPIRATION RATE: 16 BRPM | DIASTOLIC BLOOD PRESSURE: 70 MMHG | TEMPERATURE: 97.8 F

## 2025-08-28 DIAGNOSIS — I70.8 LEFT SUBCLAVIAN ARTERY OCCLUSION: ICD-10-CM

## 2025-08-28 DIAGNOSIS — R09.89 OTHER SPECIFIED SYMPTOMS AND SIGNS INVOLVING THE CIRCULATORY AND RESPIRATORY SYSTEMS: ICD-10-CM

## 2025-08-28 DIAGNOSIS — Z79.01 LONG TERM CURRENT USE OF ANTICOAGULANT: ICD-10-CM

## 2025-08-28 PROCEDURE — G0463 HOSPITAL OUTPT CLINIC VISIT: HCPCS | Performed by: SURGERY

## 2025-08-28 RX ORDER — ATORVASTATIN CALCIUM 40 MG/1
40 TABLET, FILM COATED ORAL DAILY
Qty: 90 TABLET | Refills: 3 | Status: SHIPPED | OUTPATIENT
Start: 2025-08-28

## 2025-08-28 ASSESSMENT — PAIN SCALES - GENERAL: PAINLEVEL_OUTOF10: NO PAIN (0)

## (undated) DEVICE — EYE MARKING PAD 581057

## (undated) DEVICE — SPONGE COTTONOID 1/2X1/2" 20-04S

## (undated) DEVICE — POSITIONER ARMBOARD FOAM 1PAIR LF FP-ARMB1

## (undated) DEVICE — IMP SCR SYN MATRIX LOW PROFILE 2.0X04MM EMERG 04.503.114.01: Type: IMPLANTABLE DEVICE | Site: SKULL | Status: NON-FUNCTIONAL

## (undated) DEVICE — BLADE KNIFE SURG 15 371115

## (undated) DEVICE — SOL WATER IRRIG 1000ML BOTTLE 2F7114

## (undated) DEVICE — IOM MONITORING 15 MIN

## (undated) DEVICE — GLOVE PROTEXIS BLUE W/NEU-THERA 8.5  2D73EB85

## (undated) DEVICE — DRAPE MAYO STAND 23X54 8337

## (undated) DEVICE — RX BACITRACIN OINTMENT 0.9G 1/32OZ CUR001109

## (undated) DEVICE — DRSG PRIMAPORE 03 1/8X6" 66000318

## (undated) DEVICE — DRAPE WARMER 66X44" ORS-300

## (undated) DEVICE — SPONGE COTTONOID 1/2X1 1/2" 20-06S

## (undated) DEVICE — SPONGE COTTONOID 1/4X1/4" 20-01SW

## (undated) DEVICE — LINEN TOWEL PACK X5 5464

## (undated) DEVICE — SOL WATER IRRIG 500ML BOTTLE 2F7113

## (undated) DEVICE — ESU GROUND PAD ADULT W/CORD E7507

## (undated) DEVICE — SPONGE COTTONOID 1X3" 20-10S

## (undated) DEVICE — EYE PREP BETADINE 5% SOLUTION 30ML 0065-0411-30

## (undated) DEVICE — GLOVE PROTEXIS MICRO 8.0  2D73PM80

## (undated) DEVICE — DRAPE STERI TOWEL LG 1010

## (undated) DEVICE — DRSG TELFA 3X8" 1238

## (undated) DEVICE — ESU CORD BIPOLAR GREEN 10-4000

## (undated) DEVICE — NDL 30GA 0.5" 305106

## (undated) DEVICE — ESU EYE HIGH TEMP 65410-183

## (undated) DEVICE — PACK MINOR EYE CUSTOM ASC

## (undated) DEVICE — DRSG STERI STRIP 1/2X4" R1547

## (undated) DEVICE — PERFORATOR 14MM CODMAN

## (undated) DEVICE — DRAPE SHEET REV FOLD 3/4 9349

## (undated) DEVICE — PACK GOWN 3/PK DISP XL SBA32GPFCB

## (undated) DEVICE — PACK MINOR EYE

## (undated) DEVICE — BLADE KNIFE BEAVER SICKLE EDGE 5.0MM 377300

## (undated) DEVICE — CATH TRAY FOLEY SURESTEP 16FR W/TMP PRB STLK LATEX A319416AM

## (undated) DEVICE — ESU CORD BIPOLAR AND IRR TUBING AESCULAP US355

## (undated) DEVICE — SU PLAIN 6-0 G-1DA 18" 770G

## (undated) DEVICE — ESU HOLSTER PLASTIC DISP E2400

## (undated) DEVICE — CUSA 36KHZ WRENCH

## (undated) DEVICE — SU VICRYL 5-0 S-14DA 18" J571G

## (undated) DEVICE — STRAP KNEE/BODY 31143004

## (undated) DEVICE — RETR ELASTIC STAYS LONE STAR BLUNT DUAL LEAD 3550-1G

## (undated) DEVICE — SPONGE SURGIFOAM 100 1974

## (undated) DEVICE — COVER CAMERA IN-LIGHT DISP LT-C02

## (undated) DEVICE — LINEN TOWEL PACK X30 5481

## (undated) DEVICE — IOM SUPPLIES

## (undated) DEVICE — PEN MARKING SKIN TYCO DEVON DUAL TIP 31145868

## (undated) DEVICE — PACK CRANIOTOMY

## (undated) DEVICE — COMB STERILE 7" PLASTIC 14-1200

## (undated) DEVICE — PREP POVIDONE IODINE SOLUTION 10% 120ML

## (undated) DEVICE — DRAPE CRANIOTOMY W/POUCH 9450

## (undated) DEVICE — COVER CAMERA VIDEO LASER 9X96" 04-CC227

## (undated) DEVICE — Device

## (undated) DEVICE — LABEL MEDICATION SYSTEM 3303-P

## (undated) DEVICE — PREP CHLORAPREP CLEAR 3ML 260400

## (undated) DEVICE — GLOVE PROTEXIS MICRO 7.5  2D73PM75

## (undated) DEVICE — SU ETHILON 3-0 PS-1 18" 1663H

## (undated) DEVICE — SUCTION MANIFOLD DORNOCH ULTRA CART UL-CL500

## (undated) DEVICE — DRAPE MICROSCOPE LEICA 54X150" AR8033650

## (undated) DEVICE — DECANTER VIAL 2006S

## (undated) DEVICE — TUBING CUSA MANIFOLD 36KHZ

## (undated) DEVICE — BLADE KNIFE BEAVER MICROSHARP GREEN 377515

## (undated) DEVICE — SU PROLENE 4-0 RB-1DA VISI-BLACK NDL 36" 8357H

## (undated) DEVICE — SPONGE COTTONOID 1/2X3" 20-07S

## (undated) DEVICE — DECANTER BAG 2002S

## (undated) DEVICE — LINEN TOWEL PACK X6 WHITE 5487

## (undated) DEVICE — SU VICRYL 3-0 SH CR 8X18" J774

## (undated) DEVICE — NIM PROBE PRASS INCREMENTING TIP 8225825

## (undated) DEVICE — WIPES FOLEY CARE SURESTEP PROVON DFC100

## (undated) DEVICE — SYR 03ML LL W/O NDL 309657

## (undated) DEVICE — APPLICATOR COTTON TIP 3" PKG OF 10 34831010

## (undated) DEVICE — SU VICRYL 4-0 TF CR 8X18" J743D

## (undated) DEVICE — GLOVE BIOGEL PI MICRO SZ 7.5 48575

## (undated) DEVICE — SPONGE COTTONOID 1/4X1/4" 20-01S

## (undated) DEVICE — SU VICRYL 6-0 S-14DA 18" UND J670G

## (undated) DEVICE — SOL ADH LIQUID BENZOIN SWAB 0.6ML C1544

## (undated) DEVICE — SU VICRYL 6-0 S-29DA 18" J555G

## (undated) DEVICE — PIN SKULL MAYFIELD ADULT TITANIUM 3/PK A1120

## (undated) DEVICE — SU VICRYL 2-0 CT-2 CR 8X18" J726D

## (undated) DEVICE — SOL RINGERS LACTATED 1000ML BAG 07953-09

## (undated) DEVICE — BONE WAX 2.5GM W31G

## (undated) DEVICE — PREP SKIN SCRUB TRAY 4461A

## (undated) DEVICE — BUR BALL 5MM CARBIDE ANSPACH S-5B-C-G1

## (undated) DEVICE — PAD CHUX UNDERPAD 23X24" 7136

## (undated) DEVICE — SU VICRYL 6-0 S-29 12" J556G

## (undated) DEVICE — CUSA 36KHZ TIP MICRO CVD EXT

## (undated) RX ORDER — ACETAMINOPHEN 325 MG/1
TABLET ORAL
Status: DISPENSED
Start: 2019-06-13

## (undated) RX ORDER — PROPOFOL 10 MG/ML
INJECTION, EMULSION INTRAVENOUS
Status: DISPENSED
Start: 2018-04-12

## (undated) RX ORDER — LIDOCAINE HYDROCHLORIDE 20 MG/ML
INJECTION, SOLUTION EPIDURAL; INFILTRATION; INTRACAUDAL; PERINEURAL
Status: DISPENSED
Start: 2020-11-05

## (undated) RX ORDER — ONDANSETRON 2 MG/ML
INJECTION INTRAMUSCULAR; INTRAVENOUS
Status: DISPENSED
Start: 2019-11-04

## (undated) RX ORDER — ACETAMINOPHEN 325 MG/1
TABLET ORAL
Status: DISPENSED
Start: 2021-10-07

## (undated) RX ORDER — EPHEDRINE SULFATE 50 MG/ML
INJECTION, SOLUTION INTRAMUSCULAR; INTRAVENOUS; SUBCUTANEOUS
Status: DISPENSED
Start: 2024-07-29

## (undated) RX ORDER — GABAPENTIN 300 MG/1
CAPSULE ORAL
Status: DISPENSED
Start: 2019-06-13

## (undated) RX ORDER — ONDANSETRON 2 MG/ML
INJECTION INTRAMUSCULAR; INTRAVENOUS
Status: DISPENSED
Start: 2019-06-13

## (undated) RX ORDER — ONDANSETRON 2 MG/ML
INJECTION INTRAMUSCULAR; INTRAVENOUS
Status: DISPENSED
Start: 2020-11-05

## (undated) RX ORDER — FENTANYL CITRATE 50 UG/ML
INJECTION, SOLUTION INTRAMUSCULAR; INTRAVENOUS
Status: DISPENSED
Start: 2018-04-12

## (undated) RX ORDER — FENTANYL CITRATE 50 UG/ML
INJECTION, SOLUTION INTRAMUSCULAR; INTRAVENOUS
Status: DISPENSED
Start: 2024-07-29

## (undated) RX ORDER — DEXAMETHASONE SODIUM PHOSPHATE 4 MG/ML
INJECTION, SOLUTION INTRA-ARTICULAR; INTRALESIONAL; INTRAMUSCULAR; INTRAVENOUS; SOFT TISSUE
Status: DISPENSED
Start: 2021-10-07

## (undated) RX ORDER — HYDRALAZINE HYDROCHLORIDE 20 MG/ML
INJECTION INTRAMUSCULAR; INTRAVENOUS
Status: DISPENSED
Start: 2018-04-12

## (undated) RX ORDER — SODIUM CHLORIDE 9 MG/ML
INJECTION, SOLUTION INTRAVENOUS
Status: DISPENSED
Start: 2018-04-12

## (undated) RX ORDER — ONDANSETRON 2 MG/ML
INJECTION INTRAMUSCULAR; INTRAVENOUS
Status: DISPENSED
Start: 2018-04-12

## (undated) RX ORDER — PHENYLEPHRINE HCL IN 0.9% NACL 1 MG/10 ML
SYRINGE (ML) INTRAVENOUS
Status: DISPENSED
Start: 2018-04-12

## (undated) RX ORDER — LIDOCAINE HYDROCHLORIDE 20 MG/ML
INJECTION, SOLUTION EPIDURAL; INFILTRATION; INTRACAUDAL; PERINEURAL
Status: DISPENSED
Start: 2018-04-12

## (undated) RX ORDER — EPHEDRINE SULFATE 50 MG/ML
INJECTION, SOLUTION INTRAMUSCULAR; INTRAVENOUS; SUBCUTANEOUS
Status: DISPENSED
Start: 2018-04-12

## (undated) RX ORDER — PROPOFOL 10 MG/ML
INJECTION, EMULSION INTRAVENOUS
Status: DISPENSED
Start: 2020-11-05

## (undated) RX ORDER — FENTANYL CITRATE 50 UG/ML
INJECTION, SOLUTION INTRAMUSCULAR; INTRAVENOUS
Status: DISPENSED
Start: 2019-11-04

## (undated) RX ORDER — PROPOFOL 10 MG/ML
INJECTION, EMULSION INTRAVENOUS
Status: DISPENSED
Start: 2021-10-07

## (undated) RX ORDER — GABAPENTIN 300 MG/1
CAPSULE ORAL
Status: DISPENSED
Start: 2019-11-04

## (undated) RX ORDER — PROPOFOL 10 MG/ML
INJECTION, EMULSION INTRAVENOUS
Status: DISPENSED
Start: 2019-11-04

## (undated) RX ORDER — DEXAMETHASONE SODIUM PHOSPHATE 4 MG/ML
INJECTION, SOLUTION INTRA-ARTICULAR; INTRALESIONAL; INTRAMUSCULAR; INTRAVENOUS; SOFT TISSUE
Status: DISPENSED
Start: 2019-11-04

## (undated) RX ORDER — LABETALOL HYDROCHLORIDE 5 MG/ML
INJECTION, SOLUTION INTRAVENOUS
Status: DISPENSED
Start: 2018-04-12

## (undated) RX ORDER — ONDANSETRON 2 MG/ML
INJECTION INTRAMUSCULAR; INTRAVENOUS
Status: DISPENSED
Start: 2021-10-07

## (undated) RX ORDER — ACETAMINOPHEN 325 MG/1
TABLET ORAL
Status: DISPENSED
Start: 2019-11-04

## (undated) RX ORDER — TETRACAINE HYDROCHLORIDE 5 MG/ML
SOLUTION OPHTHALMIC
Status: DISPENSED
Start: 2019-11-04

## (undated) RX ORDER — ACETAMINOPHEN 325 MG/1
TABLET ORAL
Status: DISPENSED
Start: 2024-07-29

## (undated) RX ORDER — EPHEDRINE SULFATE 50 MG/ML
INJECTION, SOLUTION INTRAMUSCULAR; INTRAVENOUS; SUBCUTANEOUS
Status: DISPENSED
Start: 2019-11-04

## (undated) RX ORDER — FENTANYL CITRATE 50 UG/ML
INJECTION, SOLUTION INTRAMUSCULAR; INTRAVENOUS
Status: DISPENSED
Start: 2020-11-05

## (undated) RX ORDER — LIDOCAINE HYDROCHLORIDE 20 MG/ML
INJECTION, SOLUTION EPIDURAL; INFILTRATION; INTRACAUDAL; PERINEURAL
Status: DISPENSED
Start: 2021-10-07